# Patient Record
Sex: MALE | Race: BLACK OR AFRICAN AMERICAN | NOT HISPANIC OR LATINO | Employment: PART TIME | URBAN - METROPOLITAN AREA
[De-identification: names, ages, dates, MRNs, and addresses within clinical notes are randomized per-mention and may not be internally consistent; named-entity substitution may affect disease eponyms.]

---

## 2017-01-16 ENCOUNTER — ALLSCRIPTS OFFICE VISIT (OUTPATIENT)
Dept: OTHER | Facility: OTHER | Age: 22
End: 2017-01-16

## 2017-01-23 ENCOUNTER — HOSPITAL ENCOUNTER (OUTPATIENT)
Dept: INFUSION CENTER | Facility: HOSPITAL | Age: 22
Discharge: HOME | End: 2017-01-23
Payer: COMMERCIAL

## 2017-02-03 RX ORDER — ACETAMINOPHEN 325 MG/1
650 TABLET ORAL ONCE
Status: COMPLETED | OUTPATIENT
Start: 2017-02-06 | End: 2017-02-06

## 2017-02-06 ENCOUNTER — HOSPITAL ENCOUNTER (OUTPATIENT)
Dept: INFUSION CENTER | Facility: HOSPITAL | Age: 22
Discharge: HOME | End: 2017-02-06
Payer: COMMERCIAL

## 2017-02-06 VITALS
RESPIRATION RATE: 20 BRPM | BODY MASS INDEX: 25.72 KG/M2 | SYSTOLIC BLOOD PRESSURE: 129 MMHG | HEIGHT: 74 IN | TEMPERATURE: 98.2 F | HEART RATE: 65 BPM | WEIGHT: 200.4 LBS | DIASTOLIC BLOOD PRESSURE: 74 MMHG

## 2017-02-06 LAB
ALBUMIN SERPL BCP-MCNC: 4.2 G/DL (ref 3.5–5)
ALP SERPL-CCNC: 49 U/L (ref 46–116)
ALT SERPL W P-5'-P-CCNC: 20 U/L (ref 12–78)
ANION GAP SERPL CALCULATED.3IONS-SCNC: 7 MMOL/L (ref 4–13)
AST SERPL W P-5'-P-CCNC: 16 U/L (ref 5–45)
BASOPHILS # BLD AUTO: 0.07 THOUSANDS/ΜL (ref 0–0.1)
BASOPHILS NFR BLD AUTO: 2 % (ref 0–1)
BILIRUB SERPL-MCNC: 0.33 MG/DL (ref 0.2–1)
BUN SERPL-MCNC: 12 MG/DL (ref 5–25)
CALCIUM SERPL-MCNC: 8.8 MG/DL (ref 8.3–10.1)
CHLORIDE SERPL-SCNC: 107 MMOL/L (ref 100–108)
CO2 SERPL-SCNC: 28 MMOL/L (ref 21–32)
CREAT SERPL-MCNC: 1.1 MG/DL (ref 0.6–1.3)
EOSINOPHIL # BLD AUTO: 0.3 THOUSAND/ΜL (ref 0–0.61)
EOSINOPHIL NFR BLD AUTO: 6 % (ref 0–6)
ERYTHROCYTE [DISTWIDTH] IN BLOOD BY AUTOMATED COUNT: 12.6 % (ref 11.6–15.1)
GFR SERPL CREATININE-BSD FRML MDRD: >60 ML/MIN/1.73SQ M
GLUCOSE SERPL-MCNC: 78 MG/DL (ref 65–140)
HCT VFR BLD AUTO: 45 % (ref 36.5–49.3)
HGB BLD-MCNC: 15.8 G/DL (ref 12–17)
LYMPHOCYTES # BLD AUTO: 1.7 THOUSANDS/ΜL (ref 0.6–4.47)
LYMPHOCYTES NFR BLD AUTO: 36 % (ref 14–44)
MCH RBC QN AUTO: 30.9 PG (ref 26.8–34.3)
MCHC RBC AUTO-ENTMCNC: 35.1 G/DL (ref 31.4–37.4)
MCV RBC AUTO: 88 FL (ref 82–98)
MONOCYTES # BLD AUTO: 0.59 THOUSAND/ΜL (ref 0.17–1.22)
MONOCYTES NFR BLD AUTO: 13 % (ref 4–12)
NEUTROPHILS # BLD AUTO: 2.03 THOUSANDS/ΜL (ref 1.85–7.62)
NEUTS SEG NFR BLD AUTO: 43 % (ref 43–75)
NRBC BLD AUTO-RTO: 0 /100 WBCS
PLATELET # BLD AUTO: 229 THOUSANDS/UL (ref 149–390)
PMV BLD AUTO: 11.1 FL (ref 8.9–12.7)
POTASSIUM SERPL-SCNC: 3.7 MMOL/L (ref 3.5–5.3)
PROT SERPL-MCNC: 7.6 G/DL (ref 6.4–8.2)
RBC # BLD AUTO: 5.11 MILLION/UL (ref 3.88–5.62)
SODIUM SERPL-SCNC: 142 MMOL/L (ref 136–145)
WBC # BLD AUTO: 4.7 THOUSAND/UL (ref 4.31–10.16)

## 2017-02-06 PROCEDURE — 96415 CHEMO IV INFUSION ADDL HR: CPT

## 2017-02-06 PROCEDURE — 85025 COMPLETE CBC W/AUTO DIFF WBC: CPT | Performed by: INTERNAL MEDICINE

## 2017-02-06 PROCEDURE — 80053 COMPREHEN METABOLIC PANEL: CPT | Performed by: INTERNAL MEDICINE

## 2017-02-06 PROCEDURE — 96375 TX/PRO/DX INJ NEW DRUG ADDON: CPT

## 2017-02-06 PROCEDURE — 96413 CHEMO IV INFUSION 1 HR: CPT

## 2017-02-06 PROCEDURE — 96367 TX/PROPH/DG ADDL SEQ IV INF: CPT

## 2017-02-06 RX ADMIN — ACETAMINOPHEN 650 MG: 325 TABLET, FILM COATED ORAL at 11:21

## 2017-02-06 RX ADMIN — RITUXIMAB 791 MG: 10 INJECTION, SOLUTION INTRAVENOUS at 12:15

## 2017-02-06 RX ADMIN — HYDROCORTISONE SODIUM SUCCINATE 100 MG: 100 INJECTION, POWDER, FOR SOLUTION INTRAMUSCULAR; INTRAVENOUS at 11:21

## 2017-02-06 RX ADMIN — DIPHENHYDRAMINE HYDROCHLORIDE 25 MG: 50 INJECTION, SOLUTION INTRAMUSCULAR; INTRAVENOUS at 11:21

## 2017-02-06 NOTE — PLAN OF CARE
Problem: Potential for Falls  Goal: Patient will remain free of falls  INTERVENTIONS:  - Assess patient frequently for physical needs  - Identify cognitive and physical deficits and behaviors that affect risk of falls    - [place] fall precautions as indicated by assessment   - Educate patient/family on patient safety including physical limitations  - Instruct patient to call for assistance with activity based on assessment  - Modify environment to reduce risk of injury  - Consider OT/PT consult to assist with strengthening/mobility   Outcome: Progressing

## 2017-04-14 ENCOUNTER — TRANSCRIBE ORDERS (OUTPATIENT)
Dept: ADMINISTRATIVE | Facility: HOSPITAL | Age: 22
End: 2017-04-14

## 2017-04-19 ENCOUNTER — ALLSCRIPTS OFFICE VISIT (OUTPATIENT)
Dept: OTHER | Facility: OTHER | Age: 22
End: 2017-04-19

## 2017-04-19 ENCOUNTER — TRANSCRIBE ORDERS (OUTPATIENT)
Dept: ADMINISTRATIVE | Facility: HOSPITAL | Age: 22
End: 2017-04-19

## 2017-04-19 DIAGNOSIS — C81.00 NODULAR LYMPHOCYTE PREDOMINANT HODGKIN LYMPHOMA (HCC): ICD-10-CM

## 2017-04-19 DIAGNOSIS — C81.40: Primary | ICD-10-CM

## 2017-04-19 DIAGNOSIS — D69.3 IMMUNE THROMBOCYTOPENIC PURPURA (HCC): ICD-10-CM

## 2017-04-21 ENCOUNTER — GENERIC CONVERSION - ENCOUNTER (OUTPATIENT)
Dept: OTHER | Facility: OTHER | Age: 22
End: 2017-04-21

## 2017-04-26 ENCOUNTER — ALLSCRIPTS OFFICE VISIT (OUTPATIENT)
Dept: OTHER | Facility: OTHER | Age: 22
End: 2017-04-26

## 2017-05-02 ENCOUNTER — HOSPITAL ENCOUNTER (OUTPATIENT)
Dept: CT IMAGING | Facility: CLINIC | Age: 22
Discharge: HOME | End: 2017-05-02
Payer: COMMERCIAL

## 2017-05-02 DIAGNOSIS — C81.40: ICD-10-CM

## 2017-05-02 PROCEDURE — 70491 CT SOFT TISSUE NECK W/DYE: CPT

## 2017-05-02 PROCEDURE — 71260 CT THORAX DX C+: CPT

## 2017-05-02 PROCEDURE — 74177 CT ABD & PELVIS W/CONTRAST: CPT

## 2017-05-02 RX ADMIN — IOHEXOL 100 ML: 350 INJECTION, SOLUTION INTRAVENOUS at 10:22

## 2017-05-03 ENCOUNTER — ALLSCRIPTS OFFICE VISIT (OUTPATIENT)
Dept: OTHER | Facility: OTHER | Age: 22
End: 2017-05-03

## 2017-05-11 RX ORDER — ACETAMINOPHEN 325 MG/1
650 TABLET ORAL ONCE
Status: COMPLETED | OUTPATIENT
Start: 2017-05-12 | End: 2017-05-12

## 2017-05-12 ENCOUNTER — HOSPITAL ENCOUNTER (OUTPATIENT)
Dept: INFUSION CENTER | Facility: HOSPITAL | Age: 22
Discharge: HOME | End: 2017-05-12
Payer: COMMERCIAL

## 2017-05-12 VITALS
BODY MASS INDEX: 25.69 KG/M2 | HEIGHT: 74 IN | HEART RATE: 70 BPM | SYSTOLIC BLOOD PRESSURE: 139 MMHG | DIASTOLIC BLOOD PRESSURE: 75 MMHG | TEMPERATURE: 98.6 F | RESPIRATION RATE: 18 BRPM | WEIGHT: 200.18 LBS

## 2017-05-12 LAB
ALBUMIN SERPL BCP-MCNC: 3.9 G/DL (ref 3.5–5)
ALP SERPL-CCNC: 53 U/L (ref 46–116)
ALT SERPL W P-5'-P-CCNC: 20 U/L (ref 12–78)
ANION GAP SERPL CALCULATED.3IONS-SCNC: 7 MMOL/L (ref 4–13)
AST SERPL W P-5'-P-CCNC: 13 U/L (ref 5–45)
BASOPHILS # BLD AUTO: 0.06 THOUSANDS/ΜL (ref 0–0.1)
BASOPHILS NFR BLD AUTO: 1 % (ref 0–1)
BILIRUB SERPL-MCNC: 0.46 MG/DL (ref 0.2–1)
BUN SERPL-MCNC: 11 MG/DL (ref 5–25)
CALCIUM SERPL-MCNC: 9 MG/DL (ref 8.3–10.1)
CHLORIDE SERPL-SCNC: 106 MMOL/L (ref 100–108)
CO2 SERPL-SCNC: 28 MMOL/L (ref 21–32)
CREAT SERPL-MCNC: 1 MG/DL (ref 0.6–1.3)
EOSINOPHIL # BLD AUTO: 0.34 THOUSAND/ΜL (ref 0–0.61)
EOSINOPHIL NFR BLD AUTO: 5 % (ref 0–6)
ERYTHROCYTE [DISTWIDTH] IN BLOOD BY AUTOMATED COUNT: 13 % (ref 11.6–15.1)
GFR SERPL CREATININE-BSD FRML MDRD: >60 ML/MIN/1.73SQ M
GLUCOSE SERPL-MCNC: 83 MG/DL (ref 65–140)
HCT VFR BLD AUTO: 43.9 % (ref 36.5–49.3)
HGB BLD-MCNC: 15 G/DL (ref 12–17)
LYMPHOCYTES # BLD AUTO: 2.54 THOUSANDS/ΜL (ref 0.6–4.47)
LYMPHOCYTES NFR BLD AUTO: 40 % (ref 14–44)
MCH RBC QN AUTO: 30 PG (ref 26.8–34.3)
MCHC RBC AUTO-ENTMCNC: 34.2 G/DL (ref 31.4–37.4)
MCV RBC AUTO: 88 FL (ref 82–98)
MONOCYTES # BLD AUTO: 0.64 THOUSAND/ΜL (ref 0.17–1.22)
MONOCYTES NFR BLD AUTO: 10 % (ref 4–12)
NEUTROPHILS # BLD AUTO: 2.72 THOUSANDS/ΜL (ref 1.85–7.62)
NEUTS SEG NFR BLD AUTO: 44 % (ref 43–75)
NRBC BLD AUTO-RTO: 0 /100 WBCS
PLATELET # BLD AUTO: 259 THOUSANDS/UL (ref 149–390)
PMV BLD AUTO: 11 FL (ref 8.9–12.7)
POTASSIUM SERPL-SCNC: 3.7 MMOL/L (ref 3.5–5.3)
PROT SERPL-MCNC: 7.4 G/DL (ref 6.4–8.2)
RBC # BLD AUTO: 5 MILLION/UL (ref 3.88–5.62)
SODIUM SERPL-SCNC: 141 MMOL/L (ref 136–145)
WBC # BLD AUTO: 6.32 THOUSAND/UL (ref 4.31–10.16)

## 2017-05-12 PROCEDURE — 96367 TX/PROPH/DG ADDL SEQ IV INF: CPT

## 2017-05-12 PROCEDURE — 85025 COMPLETE CBC W/AUTO DIFF WBC: CPT | Performed by: INTERNAL MEDICINE

## 2017-05-12 PROCEDURE — 96375 TX/PRO/DX INJ NEW DRUG ADDON: CPT

## 2017-05-12 PROCEDURE — 96415 CHEMO IV INFUSION ADDL HR: CPT

## 2017-05-12 PROCEDURE — 80053 COMPREHEN METABOLIC PANEL: CPT | Performed by: INTERNAL MEDICINE

## 2017-05-12 PROCEDURE — 96413 CHEMO IV INFUSION 1 HR: CPT

## 2017-05-12 RX ADMIN — RITUXIMAB 791 MG: 10 INJECTION, SOLUTION INTRAVENOUS at 13:27

## 2017-05-12 RX ADMIN — ACETAMINOPHEN 650 MG: 325 TABLET, FILM COATED ORAL at 12:32

## 2017-05-12 RX ADMIN — DIPHENHYDRAMINE HYDROCHLORIDE 25 MG: 50 INJECTION, SOLUTION INTRAMUSCULAR; INTRAVENOUS at 12:32

## 2017-05-12 RX ADMIN — HYDROCORTISONE SODIUM SUCCINATE 100 MG: 100 INJECTION, POWDER, FOR SOLUTION INTRAMUSCULAR; INTRAVENOUS at 12:32

## 2017-05-12 NOTE — PLAN OF CARE
Problem: Potential for Falls  Goal: Patient will remain free of falls  INTERVENTIONS:  - Assess patient frequently for physical needs  - Identify cognitive and physical deficits and behaviors that affect risk of falls    - [place] fall precautions as indicated by assessment   - Educate patient/family on patient safety including physical limitations  - Instruct patient to call for assistance with activity based on assessment  - Modify environment to reduce risk of injury  - Consider OT/PT consult to assist with strengthening/mobility   Outcome: Progressing

## 2017-05-26 DIAGNOSIS — J18.1 LOBAR PNEUMONIA (HCC): ICD-10-CM

## 2017-08-25 RX ORDER — ACETAMINOPHEN 325 MG/1
650 TABLET ORAL ONCE
Status: DISCONTINUED | OUTPATIENT
Start: 2017-08-28 | End: 2017-08-28

## 2017-08-25 RX ORDER — SODIUM CHLORIDE 9 MG/ML
20 INJECTION, SOLUTION INTRAVENOUS CONTINUOUS
Status: DISCONTINUED | OUTPATIENT
Start: 2017-08-28 | End: 2017-08-28

## 2017-08-28 ENCOUNTER — HOSPITAL ENCOUNTER (OUTPATIENT)
Dept: INFUSION CENTER | Facility: CLINIC | Age: 22
Discharge: HOME | End: 2017-08-28
Payer: COMMERCIAL

## 2017-08-28 RX ORDER — SODIUM CHLORIDE 9 MG/ML
20 INJECTION, SOLUTION INTRAVENOUS CONTINUOUS
Status: DISCONTINUED | OUTPATIENT
Start: 2017-08-29 | End: 2017-09-01 | Stop reason: HOSPADM

## 2017-08-28 RX ORDER — ACETAMINOPHEN 325 MG/1
650 TABLET ORAL ONCE
Status: COMPLETED | OUTPATIENT
Start: 2017-08-29 | End: 2017-08-29

## 2017-08-29 ENCOUNTER — HOSPITAL ENCOUNTER (OUTPATIENT)
Dept: INFUSION CENTER | Facility: CLINIC | Age: 22
Discharge: HOME | End: 2017-08-29
Payer: COMMERCIAL

## 2017-08-29 VITALS
OXYGEN SATURATION: 98 % | WEIGHT: 204.5 LBS | BODY MASS INDEX: 27.1 KG/M2 | SYSTOLIC BLOOD PRESSURE: 122 MMHG | DIASTOLIC BLOOD PRESSURE: 68 MMHG | HEART RATE: 63 BPM | RESPIRATION RATE: 16 BRPM | HEIGHT: 73 IN | TEMPERATURE: 97.6 F

## 2017-08-29 PROCEDURE — 96413 CHEMO IV INFUSION 1 HR: CPT

## 2017-08-29 PROCEDURE — 96367 TX/PROPH/DG ADDL SEQ IV INF: CPT

## 2017-08-29 PROCEDURE — 96415 CHEMO IV INFUSION ADDL HR: CPT

## 2017-08-29 PROCEDURE — 96375 TX/PRO/DX INJ NEW DRUG ADDON: CPT

## 2017-08-29 RX ADMIN — HYDROCORTISONE SODIUM SUCCINATE 100 MG: 100 INJECTION, POWDER, FOR SOLUTION INTRAMUSCULAR; INTRAVENOUS at 10:17

## 2017-08-29 RX ADMIN — ACETAMINOPHEN 650 MG: 325 TABLET ORAL at 10:15

## 2017-08-29 RX ADMIN — SODIUM CHLORIDE 20 ML/HR: 0.9 INJECTION, SOLUTION INTRAVENOUS at 10:00

## 2017-08-29 RX ADMIN — DIPHENHYDRAMINE HYDROCHLORIDE 25 MG: 50 INJECTION, SOLUTION INTRAMUSCULAR; INTRAVENOUS at 10:20

## 2017-08-29 RX ADMIN — RITUXIMAB 799 MG: 10 INJECTION, SOLUTION INTRAVENOUS at 10:50

## 2017-08-29 NOTE — PROGRESS NOTES
Pt tolerated infusion without issue  PIV d/c'd dsd applied  Pt has f/u appt next infusion in 3 months  Pt declined AVS

## 2017-08-29 NOTE — PROGRESS NOTES
Pt to infusion center for rituxan for lymphocytic lymphoma  Pt offers no complaints at this time  PIV inserted without issue pt tolerated well  Labs reviewed wnl

## 2017-09-11 ENCOUNTER — GENERIC CONVERSION - ENCOUNTER (OUTPATIENT)
Dept: OTHER | Facility: OTHER | Age: 22
End: 2017-09-11

## 2017-09-11 DIAGNOSIS — C81.00 NODULAR LYMPHOCYTE PREDOMINANT HODGKIN LYMPHOMA (HCC): ICD-10-CM

## 2017-11-24 RX ORDER — SODIUM CHLORIDE 9 MG/ML
20 INJECTION, SOLUTION INTRAVENOUS CONTINUOUS
Status: DISCONTINUED | OUTPATIENT
Start: 2017-11-27 | End: 2017-11-30 | Stop reason: HOSPADM

## 2017-11-24 RX ORDER — ACETAMINOPHEN 325 MG/1
650 TABLET ORAL ONCE
Status: COMPLETED | OUTPATIENT
Start: 2017-11-27 | End: 2017-11-27

## 2017-11-27 ENCOUNTER — HOSPITAL ENCOUNTER (OUTPATIENT)
Dept: INFUSION CENTER | Facility: CLINIC | Age: 22
Discharge: HOME | End: 2017-11-27
Payer: COMMERCIAL

## 2017-11-27 VITALS
WEIGHT: 203.71 LBS | SYSTOLIC BLOOD PRESSURE: 146 MMHG | TEMPERATURE: 98.1 F | BODY MASS INDEX: 27 KG/M2 | HEIGHT: 73 IN | DIASTOLIC BLOOD PRESSURE: 72 MMHG | RESPIRATION RATE: 16 BRPM | HEART RATE: 68 BPM

## 2017-11-27 PROCEDURE — 96367 TX/PROPH/DG ADDL SEQ IV INF: CPT

## 2017-11-27 PROCEDURE — 96413 CHEMO IV INFUSION 1 HR: CPT

## 2017-11-27 PROCEDURE — 96375 TX/PRO/DX INJ NEW DRUG ADDON: CPT

## 2017-11-27 PROCEDURE — 96415 CHEMO IV INFUSION ADDL HR: CPT

## 2017-11-27 RX ADMIN — SODIUM CHLORIDE 20 ML/HR: 0.9 INJECTION, SOLUTION INTRAVENOUS at 10:30

## 2017-11-27 RX ADMIN — RITUXIMAB 799 MG: 10 INJECTION, SOLUTION INTRAVENOUS at 12:05

## 2017-11-27 RX ADMIN — ACETAMINOPHEN 650 MG: 325 TABLET ORAL at 10:29

## 2017-11-27 RX ADMIN — HYDROCORTISONE SODIUM SUCCINATE 100 MG: 100 INJECTION, POWDER, FOR SOLUTION INTRAMUSCULAR; INTRAVENOUS at 11:05

## 2017-11-27 RX ADMIN — DIPHENHYDRAMINE HYDROCHLORIDE 25 MG: 50 INJECTION, SOLUTION INTRAMUSCULAR; INTRAVENOUS at 11:22

## 2017-11-27 NOTE — PROGRESS NOTES
Spoke with Sukhwinder Wasserman for Dr Nery Hernández  Informed her pt ANC 0 8  Pt denies any sign of infection  Pt afebrile at this time   Per Dr Prudence Ford ok to proceed with patient treatment with ANC of 0 8

## 2017-11-27 NOTE — PLAN OF CARE
Problem: INFECTION - ADULT  Goal: Absence of fever/infection during neutropenic period  INTERVENTIONS:  - Monitor WBC  - Implement neutropenic guidelines   Outcome: Progressing      Problem: Knowledge Deficit  Goal: Patient/family/caregiver demonstrates understanding of disease process, treatment plan, medications, and discharge instructions  Complete learning assessment and assess knowledge base    Interventions:  - Provide teaching at level of understanding  - Provide teaching via preferred learning methods   Outcome: Progressing

## 2018-01-11 NOTE — MISCELLANEOUS
Message   Recorded as Task   Date: 10/13/2016 01:36 PM, Created By: Concetta Ray   Task Name: Call Back   Assigned To: Liz Donohue   Regarding Patient: Natalie Malcolm, Status: In Progress   Jhespinoza Ashraf - 13 Oct 2016 1:36 PM     TASK CREATED  Patient called just had a visit with Dr Mcdonnell Em was told he "does not have cancer" and wants to know can  cancel his treatment appointment  CELL PHONE [phone]   Liz Donohue - 13 Oct 2016 2:01 PM     TASK IN PROGRESS   Liz Donohue - 13 Oct 2016 2:25 PM     TASK EDITED  Per Dr Crow Yeager is for ITP, patient notified to complete the 4 doses of rituxan, patient and mother verbalize understanding        Active Problems    1  Acne (706 1) (L70 9)   2  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

## 2018-01-12 VITALS
RESPIRATION RATE: 16 BRPM | HEART RATE: 92 BPM | SYSTOLIC BLOOD PRESSURE: 120 MMHG | HEIGHT: 73 IN | WEIGHT: 202.13 LBS | TEMPERATURE: 99.4 F | DIASTOLIC BLOOD PRESSURE: 72 MMHG | BODY MASS INDEX: 26.79 KG/M2 | OXYGEN SATURATION: 99 %

## 2018-01-12 VITALS
RESPIRATION RATE: 16 BRPM | DIASTOLIC BLOOD PRESSURE: 70 MMHG | WEIGHT: 205.31 LBS | TEMPERATURE: 98.1 F | SYSTOLIC BLOOD PRESSURE: 122 MMHG | HEART RATE: 71 BPM | OXYGEN SATURATION: 98 % | HEIGHT: 73 IN | BODY MASS INDEX: 27.21 KG/M2

## 2018-01-12 NOTE — PROGRESS NOTES
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)    Plan  Idiopathic thrombocytopenic purpura    · (1) CBC/PLT/DIFF; Status:Active; Requested ROSEMARY:[id number];    Perform:[facility]; XCI:[id number]; Last Updated By:Fabiana Eason; 11/7/2016 12:13:21 PM;Ordered; For:Idiopathic thrombocytopenic purpura; Ordered By:Sena Daniels);   · (1) CBC/PLT/DIFF; Status:Active; Requested FAV:[id number];    Perform:[facility]; OXF:[id number]; Last Updated Austin Irizarry; 11/7/2016 12:05:55 PM;Ordered; For:Idiopathic thrombocytopenic purpura; Ordered By:Sena Daniels);   · (1) CBC/PLT/DIFF; Status:Complete; Requested for:Recurring Schedule: 11/7/2016;  1/7/2017 ;    Perform:[facility]; UAN:[id number]; Last Updated Austin Irizarry; 11/7/2016 12:13:21 PM;Ordered; For:Idiopathic thrombocytopenic purpura; Ordered By:Sena Daniels); Lymphocyte-predominant Hodgkin's disease    · (1) COMPREHENSIVE METABOLIC PANEL; Status:Active; Requested for:Dates  Schedule: 1/7/2017 ;    Perform:[facility]; ZAX:[id number]; Last Updated Austin Irizarry; 11/7/2016 12:06:58 PM;Ordered; For:Lymphocyte-predominant Hodgkin's disease; Ordered By:Sean Daniels);   · (1) LD (LDH); Status:Active; Requested for:Dates Schedule: 1/7/2017 ;    Perform:[facility]; SASKIA:[id number]; Last Updated Austin Irizarry; 11/7/2016 12:06:58 PM;Ordered; For:Lymphocyte-predominant Hodgkin's disease; Ordered By:Sena Daniels); · Follow-up visit in 2 months Evaluation and Treatment  Follow-up  Status: Complete   Done: [id number]   Ordered;  For: Lymphocyte-predominant Hodgkin's disease; Ordered By: Des Mccarty) Performed:  Due: [id number]; Last Updated By: Dennys Dyson; 11/7/2016 12:15:03 PM    Discussion/Summary  Discussion Summary:   Recurrence of immune thrombocytopenic purpura and concern for recurrence of lymphocyte predominant Hodgkin lymphoma involving both axillary area greater on the left than the right area, cervical area and oropharynx  The insurance did not approve PET scan  CT N/C/A 9/8/16 compared to 2/2/2012 scan: Left greater than right axillary lymphadenopathy with some lymph nodes appearing larger and others smaller on today's study consistent with mixed chemotherapeutic response  Prominent but not pathologically enlarged lymph nodes within the level 2 chip station as well as within [facility] ring  Although somewhat atypical for discontinuous involvement of Hodgkin lymphoma, consider characterization with physiologic imaging  No soft tissue mass within the neck No involvement in the mediastinal, retroperitoneal, mesenteric, pelvic lymph nodes, normal liver and spleen  He was evaluated for second opinion by Dr Jacinto Rahman 9/12/16  Due to the indolent nature of the recommendation was to 1: Rebiopsy: 9/26/16 Left axillary LN: Reactive follicular hyperplasia  There is no evidence of a B-cell or T-cell lymphoproliferative disorder on Flow Cytometry  The present specimen is compared to the patient's previous lymph node biopsy, [id number], and no evidence of recurrent nodular lymphocyte predominant Hodgkin lymphoma is noted  - No evidence of Hodgkin lymphoma  Options if LPHL recurrence with low volume disease as recommended by Dr Jacinto Rahman were Rituxan or involved site RT  #2  Rituximab 375mg/m2 weekly x4 9/29/16 - 10/20/16  Platelets were 51 5/04/48  250 10/5/16  He is asked to have repeat CBCD as it was 1 month since his last Rituxan dose  If platelet count still adequate, will observe and follow up in 2 months with repeat labs  As biopsy was negative for maligancy and platelet count normalized , will observe at this time  No additional Rituxan  Patient verbalized understanding        History of Present Illness  HPI: 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 no evidence of bone marrow involvement, no B  symptoms, he presented with immune thrombocytopenic purpura and anemia in February 2011, treated with rituximab weekly x4 doses along with prednisone with excellent response of his thrombocytopenia and anemia, later on he received rituximab 375 mg per meter square every 2 months he finished dose #12 in March 2014       Previous Therapy:   Rituximab weekly x4 doses along with prednisone with excellent response of his thrombocytopenia and anemia, later on he received rituximab 375 mg per meter square every 2 months he finished dose #12 in March 2014  #2 Rituximab 375mg/m2 weekly x4 9/29/16 - 10/20/16  Interval History: At his [facility] follow up 8/26/16,left posterior cervical lymphadenopathy, left axillary lymphadenopathy palpated  Thrombocytopenia with platelet count 51 1/27/68 [facility]  CT/PET scan ordered, denied by insurance  CT neck, chest and abdomen 9/8/16 at [facility]: Left greater than right axillary lymphadenopathy with some lymph nodes appearing larger and others smaller on today's study consistent with mixed chemotherapeutic response  Excisional biopsy of the left axillary lymph node performed which did not identify recurrence  Continues to feel well  No 'B' symptoms  Review of Systems  Complete-Male:   Constitutional: No fever or chills, feels well, no tiredness, no recent weight gain or weight loss  Eyes: No complaints of eye pain, no red eyes, no discharge from eyes, no itchy eyes  ENT: no complaints of earache, no hearing loss, no nosebleeds, no nasal discharge, no sore throat, no hoarseness  Cardiovascular: No complaints of slow heart rate, no fast heart rate, no chest pain, no palpitations, no leg claudication, no lower extremity  Respiratory: No complaints of shortness of breath, no wheezing, no cough, no SOB on exertion, no orthopnea or PND     Gastrointestinal: No complaints of abdominal pain, no constipation, no nausea or vomiting, no diarrhea or bloody stools  Genitourinary: No complaints of dysuria, no incontinence, no hesitancy, no nocturia, no genital lesion, no testicular pain  Musculoskeletal: No complaints of arthralgia, no myalgias, no joint swelling or stiffness, no limb pain or swelling  Integumentary: No complaints of skin rash or skin lesions, no itching, no skin wound, no dry skin  Neurological: No compliants of headache, no confusion, no convulsions, no numbness or tingling, no dizziness or fainting, no limb weakness, no difficulty walking  Psychiatric: Is not suicidal, no sleep disturbances, no anxiety or depression, no change in personality, no emotional problems  Endocrine: No complaints of proptosis, no hot flashes, no muscle weakness, no erectile dysfunction, no deepening of the voice, no feelings of weakness  Hematologic/Lymphatic: No complaints of swollen glands, no swollen glands in the neck, does not bleed easily, no easy bruising  Active Problems    1  Acne (706 1) (L70 9)   2  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)   3  Idiopathic thrombocytopenic purpura (287 31) (D69 3)   4  Lymphadenopathy (785 6) (R59 1)   5  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)   6  Need for hepatitis A vaccination (V05 3) (Z23)    Past Medical History    1  History of School physical exam (V70 5) (Z02 0)    Surgical History    1  History of Biopsy Lymph Node   2  History of [place] Lymph Node Biopsy  Surgical History Reviewed: The surgical history was reviewed and updated today  Family History  Mother    1  No pertinent family history  Maternal Grandmother    2  Family history of Brain Cancer (V16 8)  Paternal Grandfather    3  Family history of Acute Lymphoma  Family History Reviewed: The family history was reviewed and updated today         Social History    · Denied: History of Alcohol Use (History)   · Denied: History of Drug Use   · Never A Smoker  Social History Reviewed: The social history was reviewed and updated today  Current Meds  Medication List Reviewed: The medication list was reviewed and updated today  Allergies    1  Penicillins    Vitals  Vital Signs    Recorded: 79EUG2595 56:55OW   Systolic 131   Diastolic 70   Heart Rate 67   Respiration 16   Temperature 97 9 F   Pain Scale 0   O2 Saturation 98   Height 6 ft 1 in   Weight 194 lb    BMI Calculated 25 59   BSA Calculated 2 12     Physical Exam    Constitutional   General appearance: No acute distress, well appearing and well nourished  Eyes   Conjunctiva and lids: No swelling, erythema, or discharge  Pupils and irises: Equal, round and reactive to light  Ears, Nose, Mouth, and Throat   External inspection of ears and nose: Normal     Otoscopic examination: Tympanic membrance translucent with normal light reflex  Canals patent without erythema  Oropharynx: Normal with no erythema, edema, exudate or lesions  Pulmonary   Auscultation of lungs: Clear to auscultation, equal breath sounds bilaterally, no wheezes, no rales, no rhonci  Cardiovascular   Auscultation of heart: Normal rate and rhythm, normal S1 and S2, without murmurs  Examination of extremities for edema and/or varicosities: Normal     Abdomen   Abdomen: Non-tender, no masses  Liver and spleen: No hepatomegaly or splenomegaly  Lymphatic   Palpation of lymph nodes in neck: Abnormal   left posterior cervical node enlargement, but no right posterior cervical node enlargement, no right submandibular node enlargement, no left submandibular node enlargement, no right supraclavicular node enlargement and no left supraclavicular node enlargement  Musculoskeletal   Gait and station: Normal     Digits and nails: Normal without clubbing or cyanosis  Inspection/palpation of joints, bones, and muscles: Normal     Skin   Skin and subcutaneous tissue: Normal without rashes or lesions      Neurologic Cranial nerves: Cranial nerves 2-12 intact  Psychiatric   Orientation to person, place and time: Normal     Mood and affect: Normal     Additional Exam:  Left axillary lymphadenopathy measuring up to 2 cm           ECOG Zero       Signatures   Electronically signed by : Benjamin [facility]; Nov 7 2016 12:27PM EST                       (Author)

## 2018-01-13 NOTE — PROGRESS NOTES
Preliminary Nursing Report                Patient Information    Initial Encounter Entry Date:   2016 10:59 AM EST (Automated Transmission Automated Transmission)       Last Modified:   {CharlyH  ModifiedOn}              Legal Name: Carline Caslilas        Social Security Number:        YOB: 1995        Age (years): 21        Gender: M        Body Mass Index (BMI): 2 kg/m2        Height: 73 in  Weight: 12 lbs (5 kgs)           Address:   [address] [place] [address]              Phone: [phone]   (consent to leave messages)        Email:        Ethnicity: Decline to State        Scientology:        Marital Status:        Preferred Language: English        Race: Other Race                    Patient Insurance Information        Primary Insurance Information Carrier Name: {Primary  CarrierName}           Carrier Address:   {Primary  CarrierAddress}              Carrier Phone: {Primary  CarrierPhone}          Group Number: {Primary  GroupNumber}          Policy Number: {Primary  PolicyNumber}          Insured Name: {Primary  InsuredName}          Insured : {Primary  InsuredDOB}          Relationship to Insured: {Primary  RelationshiptoInsured}           Secondary Insurance Information Carrier Name: {Secondary  CarrierName}           Carrier Address:   {Secondary  CarrierAddress}              Carrier Phone: {Secondary  CarrierPhone}          Group Number: {Secondary  GroupNumber}          Policy Number: {Secondary  PolicyNumber}          Insured Name: {Secondary  InsuredName}          Insured : {Secondary  InsuredDOB}          Relationship to Insured: {Secondary  RelationshiptoInsured}                       Health Profile   Booking #:   Moira Rajan #: [id number]               DOS: 2016    Surgery : BIOPSY/REMOVAL,LYMPH NODE(S)        Add'l Procedures/Notes:     Surgery Risk: Intermediate          Precautions     BMI                   Allergies    Penicillins Medications    Vicodin 5-300 MG Oral Tablet               Conditions    Acne       Idiopathic thrombocytopenic purpura       Lymphadenopathy       Lymphocyte-predominant Hodgkin's disease       Need for hepatitis A vaccination       School physical exam               Family History    None             Surgical History    None             Social History    Denies Alcohol Use (History)       Denies Drug Use       Never A Smoker                               Patient Instructions       ? NPO Instructions   The day before surgery it is recommended to have a light dinner at your usual time and you are allowed a light snack early in the evening  Do not eat anything heavy or eat a big meal after 7pm  Do not eat or drink anything after midnight prior to your surgery  If you are supposed to take any of your medications, do so with a sip of water  Failure to follow these instructions can lead to an increased risk of lung complications and may result in a delay or cancellation of your procedure  If you have any questions, contact your institution for further instructions  No candy, no gum, no mints, no chewing tobacco   Triggered by: Medical Procedure Risk         ? Opioids (Pain Medication) 62  Please continue the following medications, if needed, up to and including the day of surgery (with a sip of water)  Triggered by: Vicodin 5-300 MG Oral Tablet               Testing Considerations       ? Chest X-ray (CXR) t  Consider a Chest X-Ray (CXR) if patient is having respiratory symptoms  Triggered by: Lymphocyte-predominant Hodgkin's disease         ? Coagulation Tests (PT/PTT/INR) t  Triggered by: Idiopathic thrombocytopenic purpura, Lymphocyte-predominant Hodgkin's disease         ? Complete Blood Count (CBC) t, client, client  If test was completed and normal within last six months, repeat test is not necessary  Triggered by:  Idiopathic thrombocytopenic purpura, Lymphocyte-predominant Hodgkin's disease, Age or Facility Rec ? Comprehensive Metabolic Panel (CMP) t  If test was completed and normal within last six months, repeat test is not necessary  Triggered by: Lymphocyte-predominant Hodgkin's disease         ? Type and Screen client  Type and Screen - Blood: If there is anticipated or possible large blood loss with this procedure, then a Type and Screen for Blood should be ordered  Triggered by: Age or Facility Rec               Consultations       ? Primary Care Physician Evaluation   Primary care physician may need to evaluate patient prior to surgery  This is likely NOT necessary if the listed conditions are chronic and stable  Triggered by: Idiopathic thrombocytopenic purpura, Lymphocyte-predominant Hodgkin's disease               Miscellaneous Questions         Question: Are you able to walk up a flight of stairs, walk up a hill or do heavy housework WITHOUT having chest pain or shortness of breath? Answer: YES                   Allergies/Conditions/Medications Not Found        The following were not recognized by our system when generating the recommendations  Please consider if this would impact any preoperative protocols  ? Denies Alcohol Use (History)       ? Denies Drug Use       ? Never A Smoker                  Appointment Information         Date:    09/26/2016        Location:    [place]        Address:           Directions:                      Footnotes revision 14      ?? Denotes a free-text entry  Legal Disclaimer: Any and all recommendations and services provided herein are designed to assist in the preoperative care of the patient  Nothing contained herein is designed to replace, eliminate or alleviate the responsibility of the attending physician to supervise and determine the patient?s preoperative care and course of treatment  Failure to provide complete, accurate information may negatively impact the system?s ability to recommend the proper preoperative protocol       THE ATTENDING PHYSICIAN IS RESPONSIBLE TO REVIEW THE SUGGESTED PREOPERATIVE PROTOCOLS/COURSE OF TREATMENT AND PRESCRIBE THE FINAL COURSE OF PREOPERATIVE TREATMENT IN CONSULTATION WITH THE PATIENT  THE ePREOP SYSTEM AND ITS MATERIALS ARE PROVIDED ? AS IS? WITHOUT WARRANTY OF ANY KIND, EXPRESS OR IMPLIED, INCLUDING, BUT NOT LIMITED TO, WARRANTIES OF PERFORMANCE OR MERCHANTABILITY OR FITNESS FOR A PARTICULAR PURPOSE  PATIENT AND PHYSICIANS HEREBY AGREE THAT THEIR USE OF THE MATERIALS AND RESOURCES ACT AS A CONSENT TO RELEASE AND WAIVE ePREOP FROM ANY AND ALL CLAIMS OF WARRANTY, TORT OR CONTRACT LAW OF ANY KIND  Electronically signed by:Fabian MENG    Sep 16 2016  5:18PM EST

## 2018-01-13 NOTE — MISCELLANEOUS
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

## 2018-01-14 VITALS
OXYGEN SATURATION: 98 % | TEMPERATURE: 96.6 F | WEIGHT: 198 LBS | RESPIRATION RATE: 16 BRPM | DIASTOLIC BLOOD PRESSURE: 78 MMHG | HEIGHT: 73 IN | HEART RATE: 63 BPM | SYSTOLIC BLOOD PRESSURE: 120 MMHG | BODY MASS INDEX: 26.24 KG/M2

## 2018-01-14 VITALS
TEMPERATURE: 98.5 F | SYSTOLIC BLOOD PRESSURE: 122 MMHG | DIASTOLIC BLOOD PRESSURE: 84 MMHG | BODY MASS INDEX: 26.26 KG/M2 | HEIGHT: 73 IN | WEIGHT: 198.13 LBS | OXYGEN SATURATION: 95 % | HEART RATE: 72 BPM

## 2018-01-22 VITALS
TEMPERATURE: 98 F | WEIGHT: 201 LBS | RESPIRATION RATE: 16 BRPM | HEIGHT: 73 IN | SYSTOLIC BLOOD PRESSURE: 120 MMHG | BODY MASS INDEX: 26.64 KG/M2 | HEART RATE: 60 BPM | DIASTOLIC BLOOD PRESSURE: 72 MMHG

## 2018-02-12 ENCOUNTER — OFFICE VISIT (OUTPATIENT)
Dept: HEMATOLOGY ONCOLOGY | Facility: CLINIC | Age: 23
End: 2018-02-12
Payer: COMMERCIAL

## 2018-02-12 ENCOUNTER — APPOINTMENT (OUTPATIENT)
Dept: LAB | Facility: CLINIC | Age: 23
End: 2018-02-12
Payer: COMMERCIAL

## 2018-02-12 ENCOUNTER — TRANSCRIBE ORDERS (OUTPATIENT)
Dept: LAB | Facility: CLINIC | Age: 23
End: 2018-02-12

## 2018-02-12 VITALS
RESPIRATION RATE: 16 BRPM | OXYGEN SATURATION: 98 % | WEIGHT: 207 LBS | BODY MASS INDEX: 28.04 KG/M2 | HEIGHT: 72 IN | DIASTOLIC BLOOD PRESSURE: 74 MMHG | SYSTOLIC BLOOD PRESSURE: 120 MMHG | HEART RATE: 62 BPM | TEMPERATURE: 98.2 F

## 2018-02-12 DIAGNOSIS — D69.3 CHRONIC ITP (IDIOPATHIC THROMBOCYTOPENIA) (HCC): ICD-10-CM

## 2018-02-12 DIAGNOSIS — C81.00: ICD-10-CM

## 2018-02-12 DIAGNOSIS — C81.00: Primary | ICD-10-CM

## 2018-02-12 LAB
ALBUMIN SERPL BCP-MCNC: 3.9 G/DL (ref 3.5–5)
ALP SERPL-CCNC: 54 U/L (ref 46–116)
ALT SERPL W P-5'-P-CCNC: 20 U/L (ref 12–78)
ANION GAP SERPL CALCULATED.3IONS-SCNC: 8 MMOL/L (ref 4–13)
AST SERPL W P-5'-P-CCNC: 16 U/L (ref 5–45)
BASOPHILS # BLD AUTO: 0.03 THOUSANDS/ΜL (ref 0–0.1)
BASOPHILS NFR BLD AUTO: 0 % (ref 0–1)
BILIRUB SERPL-MCNC: 0.3 MG/DL (ref 0.2–1)
BUN SERPL-MCNC: 19 MG/DL (ref 5–25)
CALCIUM SERPL-MCNC: 8.7 MG/DL (ref 8.3–10.1)
CHLORIDE SERPL-SCNC: 105 MMOL/L (ref 100–108)
CO2 SERPL-SCNC: 28 MMOL/L (ref 21–32)
CREAT SERPL-MCNC: 1.11 MG/DL (ref 0.6–1.3)
EOSINOPHIL # BLD AUTO: 0.38 THOUSAND/ΜL (ref 0–0.61)
EOSINOPHIL NFR BLD AUTO: 5 % (ref 0–6)
ERYTHROCYTE [DISTWIDTH] IN BLOOD BY AUTOMATED COUNT: 12.7 % (ref 11.6–15.1)
GFR SERPL CREATININE-BSD FRML MDRD: 107 ML/MIN/1.73SQ M
GLUCOSE SERPL-MCNC: 89 MG/DL (ref 65–140)
HCT VFR BLD AUTO: 42.6 % (ref 36.5–49.3)
HGB BLD-MCNC: 14.5 G/DL (ref 12–17)
LDH SERPL-CCNC: 184 U/L (ref 81–234)
LYMPHOCYTES # BLD AUTO: 2.19 THOUSANDS/ΜL (ref 0.6–4.47)
LYMPHOCYTES NFR BLD AUTO: 31 % (ref 14–44)
MCH RBC QN AUTO: 29.7 PG (ref 26.8–34.3)
MCHC RBC AUTO-ENTMCNC: 34 G/DL (ref 31.4–37.4)
MCV RBC AUTO: 87 FL (ref 82–98)
MONOCYTES # BLD AUTO: 0.71 THOUSAND/ΜL (ref 0.17–1.22)
MONOCYTES NFR BLD AUTO: 10 % (ref 4–12)
NEUTROPHILS # BLD AUTO: 3.87 THOUSANDS/ΜL (ref 1.85–7.62)
NEUTS SEG NFR BLD AUTO: 54 % (ref 43–75)
PLATELET # BLD AUTO: 230 THOUSANDS/UL (ref 149–390)
PMV BLD AUTO: 10.9 FL (ref 8.9–12.7)
POTASSIUM SERPL-SCNC: 3.8 MMOL/L (ref 3.5–5.3)
PROT SERPL-MCNC: 7.4 G/DL (ref 6.4–8.2)
RBC # BLD AUTO: 4.89 MILLION/UL (ref 3.88–5.62)
SODIUM SERPL-SCNC: 141 MMOL/L (ref 136–145)
WBC # BLD AUTO: 7.18 THOUSAND/UL (ref 4.31–10.16)

## 2018-02-12 PROCEDURE — 85025 COMPLETE CBC W/AUTO DIFF WBC: CPT

## 2018-02-12 PROCEDURE — 83615 LACTATE (LD) (LDH) ENZYME: CPT

## 2018-02-12 PROCEDURE — 80053 COMPREHEN METABOLIC PANEL: CPT

## 2018-02-12 PROCEDURE — 36415 COLL VENOUS BLD VENIPUNCTURE: CPT

## 2018-02-12 PROCEDURE — 99214 OFFICE O/P EST MOD 30 MIN: CPT | Performed by: INTERNAL MEDICINE

## 2018-02-12 NOTE — LETTER
February 12, 2018     Kandis Fairchild MD  [address]  [address] [employer]  [address] [place] [id number]    Patient: Seun Botello  YOB: 1995   Date of Visit: 2/12/2018       Dear Dr Collette Shutters: Thank you for referring Zach Colon to me for evaluation  Below are my notes for this consultation  If you have questions, please do not hesitate to call me  I look forward to following your patient along with you  Sincerely,        Arcadio Oates MD        CC: No Recipients  Arcadio Oates MD  2/12/2018 12:59 PM  Sign at close encounter  Hematology Outpatient Follow - Up Note  Seun Botello  23 y o  male MRN: @ Encounter: [id number]        Date:  2/12/2018        Assessment/ Plan:    1  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 no bone marrow involvement no constitutional symptoms when he presented with thrombocytopenia as well as lymphadenopathy, he was treated with rituximab with good response in February 2011, and he received maintenance rituximab every 2 month finished 12 doses in March 2014   In September 2016 he had thrombocytopenia, axillary lymphadenopathy, biopsy did not confirm lymphocyte-predominant Hodgkin lymphoma however 2nd opinion at [facility] agreed on restarting rituximab  Proceed with dose 5 /out of 8 of rituximab in February 2018  2   Follow-up in 3 months with CBC, CMP, LDH          HPI:  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, no evidence of bone marrow involvement no constitutional symptoms when he presented with immune thrombocytopenic purpura and anemia in February 2011, status post excisional axillary lymph node biopsy, was seen on a 2nd opinion at [facility] and the recommendation to continue treatment with rituximab  He was treated with rituximab weekly x4 doses along with prednisone with excellent response of thrombocytopenia and anemia and later on he received rituximab 375 milligram/meter squared every 2 months finished dose 12  In March 2014  Follow-up in September 2016 showed left posterior cervical lymphadenopathy, left axillary lymphadenopathy and thrombocytopenia with platelet count 20937, PET scan denied by the insurance, CT scan showed left greater than the right axillary lymphadenopathy, excisional biopsy of the left axillary lymph node did not identify recurrence however a 2nd opinion at [facility] again recommended treatment with rituximab    Interval History:   No changes from the past 3 months      Previous Treatment:   As mentioned above  Current treatment rituximab 375 milligram/meter squared every three-month dose 5  Out of 8 in February 2018       Test Results:    Imaging: No results found  Labs:   Lab Results   Component Value Date    WBC 7 18 02/12/2018    HGB 14 5 02/12/2018    HCT 42 6 02/12/2018    MCV 87 02/12/2018     02/12/2018     Lab Results   Component Value Date     02/12/2018    K 3 8 02/12/2018     02/12/2018    CO2 28 02/12/2018    ANIONGAP 8 02/12/2018    BUN 19 02/12/2018    CREATININE 1 11 02/12/2018    GLUCOSE 89 02/12/2018    CALCIUM 8 7 02/12/2018    AST 16 02/12/2018    ALT 20 02/12/2018    ALKPHOS 54 02/12/2018    PROT 7 4 02/12/2018    BILITOT 0 30 02/12/2018    EGFR 107 02/12/2018       No results found for: IRON, TIBC, FERRITIN    No results found for: [id number]      ROS:   Review of Systems   All other systems reviewed and are negative  Current Medications: Reviewed  Allergies: Reviewed  PMH/FH/SH:  Reviewed      Physical Exam:    Body surface area is 2 16 meters squared      Wt Readings from Last 3 Encounters:   02/12/18 93 9 kg (207 lb)   11/27/17 92 4 kg (203 lb 11 3 oz)   09/11/17 91 2 kg (201 lb)        Temp Readings from Last 3 Encounters:   02/12/18 98 2 °F (36 8 °C)   11/27/17 98 1 °F (36 7 °C) (Oral)   09/11/17 98 °F (36 7 °C)        BP Readings from Last 3 Encounters:   02/12/18 120/74   11/27/17 146/72   09/11/17 120/72         Pulse Readings from Last 3 Encounters:   02/12/18 62   11/27/17 68   09/11/17 60        Physical Exam   Constitutional: He is oriented to person, place, and time  He appears well-developed  HENT:   Head: Normocephalic  Eyes: Conjunctivae are normal  Pupils are equal, round, and reactive to light  Neck: Normal range of motion  Neck supple  No JVD present  No thyromegaly present  Cardiovascular: Normal rate, regular rhythm, normal heart sounds and intact distal pulses  Pulmonary/Chest: Effort normal and breath sounds normal    Abdominal: Soft  Bowel sounds are normal  He exhibits no distension  There is no tenderness  There is no rebound  Musculoskeletal: Normal range of motion  Neurological: He is alert and oriented to person, place, and time  No cranial nerve deficit  Skin: Skin is warm and dry  No rash noted  No erythema  Goals and Barriers:  Current Goal: Minimize effects of disease  Barriers: None  Patient's Capacity to Self Care:  Patient is able to self care      Code Status: @[facility]@

## 2018-02-12 NOTE — PROGRESS NOTES
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 no bone marrow involvement no constitutional symptoms when he presented with thrombocytopenia as well as lymphadenopathy, he was treated with rituximab with good response in February 2011, and he received maintenance rituximab every 2 month finished 12 doses in March 2014   In September 2016 he had thrombocytopenia, axillary lymphadenopathy, biopsy did not confirm lymphocyte-predominant Hodgkin lymphoma however 2nd opinion at [facility] agreed on restarting rituximab  Proceed with dose 5 /out of 8 of rituximab in February 2018  2  Follow-up in 3 months with CBC, CMP, LDH          HPI:  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, no evidence of bone marrow involvement no constitutional symptoms when he presented with immune thrombocytopenic purpura and anemia in February 2011, status post excisional axillary lymph node biopsy, was seen on a 2nd opinion at [facility] and the recommendation to continue treatment with rituximab  He was treated with rituximab weekly x4 doses along with prednisone with excellent response of thrombocytopenia and anemia and later on he received rituximab 375 milligram/meter squared every 2 months finished dose 12   In March 2014  Follow-up in September 2016 showed left posterior cervical lymphadenopathy, left axillary lymphadenopathy and thrombocytopenia with platelet count 42215, PET scan denied by the insurance, CT scan showed left greater than the right axillary lymphadenopathy, excisional biopsy of the left axillary lymph node did not identify recurrence however a 2nd opinion at [facility] again recommended treatment with rituximab    Interval History: No changes from the past 3 months      Previous Treatment:   As mentioned above  Current treatment rituximab 375 milligram/meter squared every three-month dose 5  Out of 8 in February 2018       Test Results:    Imaging: No results found  Labs:   Lab Results   Component Value Date    WBC 7 18 02/12/2018    HGB 14 5 02/12/2018    HCT 42 6 02/12/2018    MCV 87 02/12/2018     02/12/2018     Lab Results   Component Value Date     02/12/2018    K 3 8 02/12/2018     02/12/2018    CO2 28 02/12/2018    ANIONGAP 8 02/12/2018    BUN 19 02/12/2018    CREATININE 1 11 02/12/2018    GLUCOSE 89 02/12/2018    CALCIUM 8 7 02/12/2018    AST 16 02/12/2018    ALT 20 02/12/2018    ALKPHOS 54 02/12/2018    PROT 7 4 02/12/2018    BILITOT 0 30 02/12/2018    EGFR 107 02/12/2018       No results found for: IRON, TIBC, FERRITIN    No results found for: [id number]      ROS:   Review of Systems   All other systems reviewed and are negative  Current Medications: Reviewed  Allergies: Reviewed  PMH/FH/SH:  Reviewed      Physical Exam:    Body surface area is 2 16 meters squared  Wt Readings from Last 3 Encounters:   02/12/18 93 9 kg (207 lb)   11/27/17 92 4 kg (203 lb 11 3 oz)   09/11/17 91 2 kg (201 lb)        Temp Readings from Last 3 Encounters:   02/12/18 98 2 °F (36 8 °C)   11/27/17 98 1 °F (36 7 °C) (Oral)   09/11/17 98 °F (36 7 °C)        BP Readings from Last 3 Encounters:   02/12/18 120/74   11/27/17 146/72   09/11/17 120/72         Pulse Readings from Last 3 Encounters:   02/12/18 62   11/27/17 68   09/11/17 60        Physical Exam   Constitutional: He is oriented to person, place, and time  He appears well-developed  HENT:   Head: Normocephalic  Eyes: Conjunctivae are normal  Pupils are equal, round, and reactive to light  Neck: Normal range of motion  Neck supple  No JVD present  No thyromegaly present  Cardiovascular: Normal rate, regular rhythm, normal heart sounds and intact distal pulses  Pulmonary/Chest: Effort normal and breath sounds normal    Abdominal: Soft  Bowel sounds are normal  He exhibits no distension  There is no tenderness  There is no rebound  Musculoskeletal: Normal range of motion  Neurological: He is alert and oriented to person, place, and time  No cranial nerve deficit  Skin: Skin is warm and dry  No rash noted  No erythema  Goals and Barriers:  Current Goal: Minimize effects of disease  Barriers: None  Patient's Capacity to Self Care:  Patient is able to self care      Code Status: @[facility]@

## 2018-02-26 RX ORDER — ACETAMINOPHEN 325 MG/1
650 TABLET ORAL ONCE
Status: COMPLETED | OUTPATIENT
Start: 2018-02-27 | End: 2018-02-27

## 2018-02-26 RX ORDER — SODIUM CHLORIDE 9 MG/ML
20 INJECTION, SOLUTION INTRAVENOUS CONTINUOUS
Status: DISCONTINUED | OUTPATIENT
Start: 2018-02-27 | End: 2018-03-01

## 2018-02-27 ENCOUNTER — HOSPITAL ENCOUNTER (OUTPATIENT)
Dept: INFUSION CENTER | Facility: CLINIC | Age: 23
Discharge: HOME | End: 2018-02-27
Payer: COMMERCIAL

## 2018-02-27 VITALS
RESPIRATION RATE: 18 BRPM | HEART RATE: 64 BPM | DIASTOLIC BLOOD PRESSURE: 76 MMHG | HEIGHT: 74 IN | BODY MASS INDEX: 26.65 KG/M2 | WEIGHT: 207.67 LBS | TEMPERATURE: 98.2 F | SYSTOLIC BLOOD PRESSURE: 133 MMHG

## 2018-02-27 PROCEDURE — 96413 CHEMO IV INFUSION 1 HR: CPT

## 2018-02-27 PROCEDURE — 96367 TX/PROPH/DG ADDL SEQ IV INF: CPT

## 2018-02-27 PROCEDURE — 96415 CHEMO IV INFUSION ADDL HR: CPT

## 2018-02-27 PROCEDURE — 96375 TX/PRO/DX INJ NEW DRUG ADDON: CPT

## 2018-02-27 RX ADMIN — DIPHENHYDRAMINE HYDROCHLORIDE 25 MG: 50 INJECTION, SOLUTION INTRAMUSCULAR; INTRAVENOUS at 10:10

## 2018-02-27 RX ADMIN — RITUXIMAB 799 MG: 10 INJECTION, SOLUTION INTRAVENOUS at 10:43

## 2018-02-27 RX ADMIN — ACETAMINOPHEN 650 MG: 325 TABLET ORAL at 10:08

## 2018-02-27 RX ADMIN — SODIUM CHLORIDE 20 ML/HR: 0.9 INJECTION, SOLUTION INTRAVENOUS at 10:09

## 2018-02-27 RX ADMIN — HYDROCORTISONE SODIUM SUCCINATE 100 MG: 100 INJECTION, POWDER, FOR SOLUTION INTRAMUSCULAR; INTRAVENOUS at 10:09

## 2018-02-27 NOTE — PLAN OF CARE
Problem: Potential for Falls  Goal: Patient will remain free of falls  INTERVENTIONS:  - Assess patient frequently for physical needs  -  Identify cognitive and physical deficits and behaviors that affect risk of falls    -  [facility] fall precautions as indicated by assessment   - Educate patient/family on patient safety including physical limitations  - Instruct patient to call for assistance with activity based on assessment  - Modify environment to reduce risk of injury  - Consider OT/PT consult to assist with strengthening/mobility   Outcome: Progressing

## 2018-03-01 ENCOUNTER — OFFICE VISIT (OUTPATIENT)
Dept: INTERNAL MEDICINE CLINIC | Facility: CLINIC | Age: 23
End: 2018-03-01
Payer: COMMERCIAL

## 2018-03-01 VITALS
DIASTOLIC BLOOD PRESSURE: 76 MMHG | HEART RATE: 76 BPM | OXYGEN SATURATION: 98 % | BODY MASS INDEX: 26.69 KG/M2 | SYSTOLIC BLOOD PRESSURE: 128 MMHG | HEIGHT: 74 IN | WEIGHT: 208 LBS | RESPIRATION RATE: 16 BRPM

## 2018-03-01 DIAGNOSIS — R05.9 COUGH: Primary | ICD-10-CM

## 2018-03-01 DIAGNOSIS — J20.9 ACUTE BRONCHITIS WITH BRONCHOSPASM: ICD-10-CM

## 2018-03-01 PROCEDURE — 99213 OFFICE O/P EST LOW 20 MIN: CPT | Performed by: PHYSICIAN ASSISTANT

## 2018-03-01 RX ORDER — AZITHROMYCIN 250 MG/1
TABLET, FILM COATED ORAL
Qty: 6 TABLET | Refills: 0 | Status: SHIPPED | OUTPATIENT
Start: 2018-03-01 | End: 2018-03-06

## 2018-03-01 NOTE — PATIENT INSTRUCTIONS
Rest, increase fluids, Tylenol for fever or aches as per package instructions  Start in finish the antibiotic  Can use Benadryl 25 mg at night before sleep  This will help the postnasal drip  Can start Allegra 180 mg OTC 1 daily  Can also add either Flonase,  Rhinocort, or Nasacort nasal spray 1 spray each nostril twice a day as demonstrated

## 2018-03-01 NOTE — PROGRESS NOTES
Assessment/Plan:     Patient Instructions   Rest, increase fluids, Tylenol for fever or aches as per package instructions  Start in finish the antibiotic  Can use Benadryl 25 mg at night before sleep  This will help the postnasal drip  Can start Allegra 180 mg OTC 1 daily  Can also add either Flonase,  Rhinocort, or Nasacort nasal spray 1 spray each nostril twice a day as demonstrated  Followup scheduled per orders  Diagnoses and all orders for this visit:    Cough  -     azithromycin (ZITHROMAX) 250 mg tablet; Take 2 tablets today then 1 tablet daily x 4 days    Acute bronchitis with bronchospasm  -     azithromycin (ZITHROMAX) 250 mg tablet; Take 2 tablets today then 1 tablet daily x 4 days          Subjective:      Patient ID: Corona Chow  is a 23 y o  male  Acute visit    Everyone in patient's household has been ill  Both patient's mother and father have been seen for acute illnesses and were treated with antibiotics and have improved  Patient is started with very similar symptoms of head congestion, sneezing, itchy eyes, postnasal drip with a cough primarily at night when supine  He is denying allergy symptoms in the past   He has noted blowing his nose that the mucus had been blood tinged at times  No earaches, no sore throat  Denies fever  Of note also patient is immunocompromised due to treatment for Hodgkin's disease  ALLERGIES:  Allergies   Allergen Reactions    Penicillins      As an infant - "lungs collapsed" per mom       CURRENT MEDICATIONS:    Current Outpatient Prescriptions:     azithromycin (ZITHROMAX) 250 mg tablet, Take 2 tablets today then 1 tablet daily x 4 days, Disp: 6 tablet, Rfl: 0  No current facility-administered medications for this visit       ACTIVE PROBLEM LIST:  Patient Active Problem List   Diagnosis    ITP (idiopathic thrombocytopenic purpura)    Stage IIA lymphocyte-predominant Hodgkin's disease (HCC)    Cough    Acute bronchitis with bronchospasm       PAST MEDICAL HISTORY:  Past Medical History:   Diagnosis Date    Cancer ([address] )     hodgkin's lymphoma    ITP (idiopathic thrombocytopenic purpura)        PAST SURGICAL HISTORY:  Past Surgical History:   Procedure Laterality Date    HERNIA REPAIR      umbilical - at 7 months of age   Meryl Almeida AK BX/REMV,LYMPH NODE,DEEP AXILL Left 9/26/2016    Procedure: AXILLARY LYMPH NODE BIOPSY;  Surgeon: Saqib Atkins MD;  [facility]: [facility];  Service: Surgical Oncology    SENTINEL LYMPH NODE BIOPSY Right        FAMILY HISTORY:  No family history on file  SOCIAL HISTORY:  Social History     Social History    Marital status: Single     Spouse name: N/A    Number of children: N/A    Years of education: N/A     Occupational History    Not on file  Social History Main Topics    Smoking status: Never Smoker    Smokeless tobacco: Never Used    Alcohol use No    Drug use: No    Sexual activity: Not on file     Other Topics Concern    Not on file     Social History Narrative    No narrative on file       Review of Systems   Constitutional: Negative for activity change, chills, fatigue and fever  HENT: Positive for congestion, postnasal drip, rhinorrhea and sneezing  Eyes: Negative for discharge  Respiratory: Positive for cough  Negative for chest tightness and shortness of breath  Cardiovascular: Negative for chest pain, palpitations and leg swelling  Gastrointestinal: Negative for abdominal pain  Genitourinary: Negative for difficulty urinating  Musculoskeletal: Negative for arthralgias and myalgias  Skin: Negative for rash  Allergic/Immunologic: Positive for immunocompromised state  Neurological: Negative for dizziness, syncope, weakness, light-headedness and headaches  Hematological: Negative for adenopathy  Does not bruise/bleed easily  Psychiatric/Behavioral: Negative for dysphoric mood  The patient is not nervous/anxious            Objective:  Vitals:    03/01/18 1256   BP: 128/76   Pulse: 76   Resp: 16   SpO2: 98%   Weight: 94 3 kg (208 lb)   Height: 6' 2" (1 88 m)        Physical Exam   Constitutional: He is oriented to person, place, and time  He appears well-developed and well-nourished  No distress  HENT:   Patient does have injected in granular conjunctiva  Nasal mucosa is pale, enlarged right inferior nasal turbinates, increased clear nasal mucoid drainage  Increased clear postnasal drainage in the posterior pharynx  Granular posterior pharynx   Neck: Neck supple  No JVD present  No thyromegaly present  Cardiovascular: Normal rate, regular rhythm and normal heart sounds  Pulmonary/Chest: Effort normal and breath sounds normal  No respiratory distress  He has no wheezes  Musculoskeletal: He exhibits no edema  Lymphadenopathy:     He has no cervical adenopathy  Neurological: He is alert and oriented to person, place, and time  Skin: Skin is warm and dry  No rash noted  Psychiatric: He has a normal mood and affect  Nursing note and vitals reviewed          RESULTS:    Recent Results (from the past 1008 hour(s))   CBC and differential    Collection Time: 02/12/18 10:56 AM   Result Value Ref Range    WBC 7 18 4 31 - 10 16 Thousand/uL    RBC 4 89 3 88 - 5 62 Million/uL    Hemoglobin 14 5 12 0 - 17 0 g/dL    Hematocrit 42 6 36 5 - 49 3 %    MCV 87 82 - 98 fL    MCH 29 7 26 8 - 34 3 pg    MCHC 34 0 31 4 - 37 4 g/dL    RDW 12 7 11 6 - 15 1 %    MPV 10 9 8 9 - 12 7 fL    Platelets 846 523 - 398 Thousands/uL    Neutrophils Relative 54 43 - 75 %    Lymphocytes Relative 31 14 - 44 %    Monocytes Relative 10 4 - 12 %    Eosinophils Relative 5 0 - 6 %    Basophils Relative 0 0 - 1 %    Neutrophils Absolute 3 87 1 85 - 7 62 Thousands/µL    Lymphocytes Absolute 2 19 0 60 - 4 47 Thousands/µL    Monocytes Absolute 0 71 0 17 - 1 22 Thousand/µL    Eosinophils Absolute 0 38 0 00 - 0 61 Thousand/µL    Basophils Absolute 0 03 0 00 - 0 10 Thousands/µL   Comprehensive metabolic panel Collection Time: 02/12/18 10:56 AM   Result Value Ref Range    Sodium 141 136 - 145 mmol/L    Potassium 3 8 3 5 - 5 3 mmol/L    Chloride 105 100 - 108 mmol/L    CO2 28 21 - 32 mmol/L    Anion Gap 8 4 - 13 mmol/L    BUN 19 5 - 25 mg/dL    Creatinine 1 11 0 60 - 1 30 mg/dL    Glucose 89 65 - 140 mg/dL    Calcium 8 7 8 3 - 10 1 mg/dL    AST 16 5 - 45 U/L    ALT 20 12 - 78 U/L    Alkaline Phosphatase 54 46 - 116 U/L    Total Protein 7 4 6 4 - 8 2 g/dL    Albumin 3 9 3 5 - 5 0 g/dL    Total Bilirubin 0 30 0 20 - 1 00 mg/dL    eGFR 107 ml/min/1 73sq m   LD,Blood    Collection Time: 02/12/18 10:56 AM   Result Value Ref Range     81 - 234 U/L

## 2018-05-01 ENCOUNTER — OFFICE VISIT (OUTPATIENT)
Dept: INTERNAL MEDICINE CLINIC | Facility: CLINIC | Age: 23
End: 2018-05-01
Payer: COMMERCIAL

## 2018-05-01 VITALS
SYSTOLIC BLOOD PRESSURE: 140 MMHG | WEIGHT: 204.2 LBS | RESPIRATION RATE: 16 BRPM | HEIGHT: 74 IN | OXYGEN SATURATION: 98 % | BODY MASS INDEX: 26.21 KG/M2 | HEART RATE: 74 BPM | DIASTOLIC BLOOD PRESSURE: 74 MMHG

## 2018-05-01 DIAGNOSIS — J30.9 ALLERGIC RHINITIS, UNSPECIFIED SEASONALITY, UNSPECIFIED TRIGGER: ICD-10-CM

## 2018-05-01 DIAGNOSIS — J01.40 ACUTE NON-RECURRENT PANSINUSITIS: ICD-10-CM

## 2018-05-01 DIAGNOSIS — J20.9 ACUTE BRONCHITIS WITH BRONCHOSPASM: Primary | ICD-10-CM

## 2018-05-01 PROCEDURE — 99213 OFFICE O/P EST LOW 20 MIN: CPT | Performed by: PHYSICIAN ASSISTANT

## 2018-05-01 RX ORDER — AZITHROMYCIN 250 MG/1
TABLET, FILM COATED ORAL
Qty: 6 TABLET | Refills: 0 | Status: SHIPPED | OUTPATIENT
Start: 2018-05-01 | End: 2018-05-05

## 2018-05-01 NOTE — PATIENT INSTRUCTIONS
Patient will continue taking Claritin 10 mg 1 daily  He will also continue his Nasacort nasal spray  Will provide prescription for antibiotic only to start if Claritin and nasal spray are not causing improvement  Take a probiotic daily if you start the antibiotic  Also patient to use his inhaler at bedtime

## 2018-05-01 NOTE — PROGRESS NOTES
Assessment/Plan:     Patient Instructions   Patient will continue taking Claritin 10 mg 1 daily  He will also continue his Nasacort nasal spray  Will provide prescription for antibiotic only to start if Claritin and nasal spray are not causing improvement  Take a probiotic daily if you start the antibiotic  Also patient to use his inhaler at bedtime  Followup scheduled per orders  Diagnoses and all orders for this visit:    Acute bronchitis with bronchospasm  -     azithromycin (ZITHROMAX) 250 mg tablet; Take 2 tablets today then 1 tablet daily x 4 days    Acute non-recurrent pansinusitis  -     azithromycin (ZITHROMAX) 250 mg tablet; Take 2 tablets today then 1 tablet daily x 4 days    Allergic rhinitis, unspecified seasonality, unspecified trigger          Subjective:      Patient ID: Alice Can  is a 20 y o  male  Acute visit    Patient states for the past 5 days he has noted especially at night increased congestion, some postnasal drip, wheezing and shortness of breath  He also notices a cough  He has not had any fever, no complaint of sweats or chills  Denies sneezing, itchy watery eyes, or persistent runny nose  He denies history of having previous allergies  He did remark that last week he was out visiting a friend in [place] and while sleeping in a hotel he awoke with what he is describing as a [address] attack          ALLERGIES:  Allergies   Allergen Reactions    Penicillins      As an infant - "lungs collapsed" per mom       CURRENT MEDICATIONS:    Current Outpatient Prescriptions:     azithromycin (ZITHROMAX) 250 mg tablet, Take 2 tablets today then 1 tablet daily x 4 days, Disp: 6 tablet, Rfl: 0    ACTIVE PROBLEM LIST:  Patient Active Problem List   Diagnosis    ITP (idiopathic thrombocytopenic purpura)    Stage IIA lymphocyte-predominant Hodgkin's disease (HCC)    Cough    Acute bronchitis with bronchospasm    Acute non-recurrent pansinusitis    Allergic rhinitis       PAST MEDICAL HISTORY:  Past Medical History:   Diagnosis Date    Cancer [address] [address] )     hodgkin's lymphoma    ITP (idiopathic thrombocytopenic purpura)        PAST SURGICAL HISTORY:  Past Surgical History:   Procedure Laterality Date    HERNIA REPAIR      umbilical - at 7 months of age   Ty SHAY BX/REMV,LYMPH NODE,DEEP AXILL Left 9/26/2016    Procedure: AXILLARY LYMPH NODE BIOPSY;  Surgeon: Boy Ledesma MD;  [facility];  Service: Surgical Oncology    SENTINEL LYMPH NODE BIOPSY Right        FAMILY HISTORY:  No family history on file  SOCIAL HISTORY:  Social History     Social History    Marital status: Single     Spouse name: N/A    Number of children: N/A    Years of education: N/A     Occupational History    Not on file  Social History Main Topics    Smoking status: Never Smoker    Smokeless tobacco: Never Used    Alcohol use No    Drug use: No    Sexual activity: Not on file     Other Topics Concern    Not on file     Social History Narrative    No narrative on file       Review of Systems   Constitutional: Negative for activity change, chills, fatigue and fever  HENT: Positive for congestion, postnasal drip and sinus pressure  Negative for rhinorrhea, sneezing and sore throat  Eyes: Negative for discharge, redness and itching  Respiratory: Positive for cough, shortness of breath and wheezing  Negative for chest tightness  Cardiovascular: Negative for chest pain, palpitations and leg swelling  Gastrointestinal: Negative for abdominal pain, diarrhea and vomiting  Genitourinary: Negative for difficulty urinating  Musculoskeletal: Negative for arthralgias and myalgias  Skin: Negative for rash  Allergic/Immunologic: Negative for immunocompromised state  Neurological: Negative for dizziness, syncope, weakness, light-headedness and headaches  Hematological: Negative for adenopathy  Does not bruise/bleed easily     Psychiatric/Behavioral: Negative for dysphoric mood  The patient is not nervous/anxious  Objective:  Vitals:    05/01/18 1404   BP: 140/74   BP Location: Left arm   Patient Position: Sitting   Cuff Size: Adult   Pulse: 74   Resp: 16   SpO2: 98%   Weight: 92 6 kg (204 lb 3 2 oz)   Height: 6' 2" (1 88 m)        Physical Exam   Constitutional: He is oriented to person, place, and time  He appears well-developed and well-nourished  No distress  HENT:   Pale boggy nasal mucosa with was swollen inferior turbinates and increased white nasal discharge, granular injected posterior pharynx with increased white postnasal drainage, right TM within normal limits, left TM not visualized due to cerumen in canal   Eyes:   Allergic shiners present, conjunctiva injected and granular   Neck: Neck supple  Carotid bruit is not present  Cardiovascular: Normal rate, regular rhythm and normal heart sounds  Pulmonary/Chest: Effort normal and breath sounds normal  No respiratory distress  He has no wheezes  Abdominal: Soft  Bowel sounds are normal    Musculoskeletal: He exhibits no edema  Lymphadenopathy:     He has no cervical adenopathy  Neurological: He is alert and oriented to person, place, and time  Skin: Skin is warm and dry  No rash noted  Psychiatric: He has a normal mood and affect  His behavior is normal    Patient's he more apprehensive today than usual   Nursing note and vitals reviewed  RESULTS:    No results found for this or any previous visit (from the past 1008 hour(s))  This note was created with voice recognition software  Phonic, grammatical and spelling errors may be present within the note as a result

## 2018-05-10 NOTE — PROGRESS NOTES
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 and the recommendation to continue treatment with rituximab  He was treated with rituximab weekly x4 doses along with prednisone with excellent response of thrombocytopenia and anemia and later on he received rituximab 375 milligram/meter squared every 2 months finished dose 12 in March 2014  Follow-up in September 2016 showed left posterior cervical lymphadenopathy, left axillary lymphadenopathy and thrombocytopenia with platelet count [id number]       PET scan denied by the insurance, CT scan showed left greater than the right axillary lymphadenopathy, excisional biopsy of the left axillary lymph node did not identify recurrence however a 2nd opinion at [address] [place] [address] Center again recommended treatment with rituximab    Interval History:   Seasonal allergies have been acting up, otherwise, feels really well  Previous Treatment:   As mentioned above  Current treatment rituximab 375 milligram/meter squared every three-month dose 6 Out of 8 in May 2018       Test Results:    Imaging: No results found  Labs:   Lab Results   Component Value Date    WBC 7 18 02/12/2018    HGB 14 5 02/12/2018    HCT 42 6 02/12/2018    MCV 87 02/12/2018     02/12/2018     Lab Results   Component Value Date     02/12/2018    K 3 8 02/12/2018     02/12/2018    CO2 28 02/12/2018    ANIONGAP 8 02/12/2018    BUN 19 02/12/2018    CREATININE 1 11 02/12/2018    GLUCOSE 89 02/12/2018    CALCIUM 8 7 02/12/2018    AST 16 02/12/2018    ALT 20 02/12/2018    ALKPHOS 54 02/12/2018    PROT 7 4 02/12/2018    BILITOT 0 30 02/12/2018    EGFR 107 02/12/2018       No results found for: IRON, TIBC, FERRITIN    No results found for: PYZUNKMH40      ROS:   Review of Systems   All other systems reviewed and are negative  Current Medications: Reviewed  Allergies: Reviewed  PMH/FH/SH:  Reviewed      Physical Exam:    There is no height or weight on file to calculate BSA  Wt Readings from Last 3 Encounters:   05/01/18 92 6 kg (204 lb 3 2 oz)   03/01/18 94 3 kg (208 lb)   02/27/18 94 2 kg (207 lb 10 8 oz)        Temp Readings from Last 3 Encounters:   02/27/18 98 2 °F (36 8 °C) (Oral)   02/12/18 98 2 °F (36 8 °C)   11/27/17 98 1 °F (36 7 °C) (Oral)        BP Readings from Last 3 Encounters:   05/01/18 140/74   03/01/18 128/76   02/27/18 133/76         Pulse Readings from Last 3 Encounters:   05/01/18 74   03/01/18 76   02/27/18 64        Physical Exam   Constitutional: He is oriented to person, place, and time  He appears well-developed  HENT:   Head: Normocephalic  Eyes: Conjunctivae are normal  Pupils are equal, round, and reactive to light  Neck: Normal range of motion  Neck supple   No JVD present  No thyromegaly present  Cardiovascular: Normal rate, regular rhythm, normal heart sounds and intact distal pulses  Pulmonary/Chest: Effort normal and breath sounds normal    Abdominal: Soft  Bowel sounds are normal  He exhibits no distension  There is no tenderness  There is no rebound  Musculoskeletal: Normal range of motion  Neurological: He is alert and oriented to person, place, and time  No cranial nerve deficit  Skin: Skin is warm and dry  No rash noted  No erythema  Goals and Barriers:  Current Goal: Minimize effects of disease  Barriers: None  Patient's Capacity to Self Care:  Patient is able to self care

## 2018-05-14 ENCOUNTER — OFFICE VISIT (OUTPATIENT)
Dept: HEMATOLOGY ONCOLOGY | Facility: CLINIC | Age: 23
End: 2018-05-14
Payer: COMMERCIAL

## 2018-05-14 VITALS
RESPIRATION RATE: 17 BRPM | TEMPERATURE: 98.4 F | OXYGEN SATURATION: 98 % | DIASTOLIC BLOOD PRESSURE: 80 MMHG | HEART RATE: 66 BPM | WEIGHT: 205 LBS | HEIGHT: 74 IN | SYSTOLIC BLOOD PRESSURE: 118 MMHG | BODY MASS INDEX: 26.31 KG/M2

## 2018-05-14 DIAGNOSIS — C81.00: Primary | ICD-10-CM

## 2018-05-14 PROCEDURE — 99213 OFFICE O/P EST LOW 20 MIN: CPT | Performed by: PHYSICIAN ASSISTANT

## 2018-05-22 ENCOUNTER — HOSPITAL ENCOUNTER (OUTPATIENT)
Dept: INFUSION CENTER | Facility: CLINIC | Age: 23
Discharge: HOME | End: 2018-05-22
Payer: COMMERCIAL

## 2018-05-22 VITALS
HEIGHT: 74 IN | TEMPERATURE: 97.8 F | DIASTOLIC BLOOD PRESSURE: 87 MMHG | BODY MASS INDEX: 26.2 KG/M2 | RESPIRATION RATE: 18 BRPM | WEIGHT: 204.15 LBS | HEART RATE: 61 BPM | SYSTOLIC BLOOD PRESSURE: 134 MMHG

## 2018-05-22 PROCEDURE — 96375 TX/PRO/DX INJ NEW DRUG ADDON: CPT

## 2018-05-22 PROCEDURE — 96367 TX/PROPH/DG ADDL SEQ IV INF: CPT

## 2018-05-22 PROCEDURE — 96413 CHEMO IV INFUSION 1 HR: CPT

## 2018-05-22 RX ORDER — SODIUM CHLORIDE 9 MG/ML
20 INJECTION, SOLUTION INTRAVENOUS CONTINUOUS
Status: DISCONTINUED | OUTPATIENT
Start: 2018-05-22 | End: 2018-05-25 | Stop reason: HOSPADM

## 2018-05-22 RX ORDER — ACETAMINOPHEN 325 MG/1
650 TABLET ORAL ONCE
Status: COMPLETED | OUTPATIENT
Start: 2018-05-22 | End: 2018-05-22

## 2018-05-22 RX ADMIN — ACETAMINOPHEN 650 MG: 325 TABLET, FILM COATED ORAL at 10:19

## 2018-05-22 RX ADMIN — DIPHENHYDRAMINE HYDROCHLORIDE 25 MG: 50 INJECTION, SOLUTION INTRAMUSCULAR; INTRAVENOUS at 10:27

## 2018-05-22 RX ADMIN — HYDROCORTISONE SODIUM SUCCINATE 100 MG: 100 INJECTION, POWDER, FOR SOLUTION INTRAMUSCULAR; INTRAVENOUS at 10:22

## 2018-05-22 RX ADMIN — SODIUM CHLORIDE 20 ML/HR: 0.9 INJECTION, SOLUTION INTRAVENOUS at 10:19

## 2018-05-22 RX ADMIN — RITUXIMAB 800 MG: 10 INJECTION, SOLUTION INTRAVENOUS at 11:03

## 2018-05-22 NOTE — PLAN OF CARE
Problem: Potential for Falls  Goal: Patient will remain free of falls  INTERVENTIONS:  - Assess patient frequently for physical needs  -  Identify cognitive and physical deficits and behaviors that affect risk of falls    -  [place] fall precautions as indicated by assessment   - Educate patient/family on patient safety including physical limitations  - Instruct patient to call for assistance with activity based on assessment  - Modify environment to reduce risk of injury  - Consider OT/PT consult to assist with strengthening/mobility   Outcome: Progressing

## 2018-05-22 NOTE — PROGRESS NOTES
Pt tolerated rituxan rapid infusion without complications  Pt was discharged in stable condition and is aware of next appointment  AVS provided

## 2018-07-11 ENCOUNTER — OFFICE VISIT (OUTPATIENT)
Dept: INTERNAL MEDICINE CLINIC | Facility: CLINIC | Age: 23
End: 2018-07-11
Payer: COMMERCIAL

## 2018-07-11 VITALS
OXYGEN SATURATION: 99 % | DIASTOLIC BLOOD PRESSURE: 82 MMHG | HEART RATE: 68 BPM | TEMPERATURE: 98.5 F | WEIGHT: 196.4 LBS | HEIGHT: 73 IN | RESPIRATION RATE: 20 BRPM | SYSTOLIC BLOOD PRESSURE: 128 MMHG | BODY MASS INDEX: 26.03 KG/M2

## 2018-07-11 DIAGNOSIS — N60.01 BILATERAL BREAST CYSTS: Primary | ICD-10-CM

## 2018-07-11 DIAGNOSIS — N60.02 BILATERAL BREAST CYSTS: Primary | ICD-10-CM

## 2018-07-11 PROBLEM — J01.40 ACUTE NON-RECURRENT PANSINUSITIS: Status: RESOLVED | Noted: 2018-05-01 | Resolved: 2018-07-11

## 2018-07-11 PROBLEM — R05.9 COUGH: Status: RESOLVED | Noted: 2018-03-01 | Resolved: 2018-07-11

## 2018-07-11 PROBLEM — J20.9 ACUTE BRONCHITIS WITH BRONCHOSPASM: Status: RESOLVED | Noted: 2018-03-01 | Resolved: 2018-07-11

## 2018-07-11 PROCEDURE — 99213 OFFICE O/P EST LOW 20 MIN: CPT | Performed by: INTERNAL MEDICINE

## 2018-07-11 NOTE — PROGRESS NOTES
Assessment/Plan:      Reassured him that these are most likely benign but will refer him to a breast specialist to evaluate this further and discuss removal as he requested  Total time, 15 minutes, greater than 50% spent face to face in counseling and coordination of care  Return if symptoms worsen or fail to improve  No problem-specific Assessment & Plan notes found for this encounter  Diagnoses and all orders for this visit:    Bilateral breast cysts  -     Ambulatory referral to Surgical Oncology; Future          Subjective:      Patient ID: Edith Yeh  is a 20 y o  male  Patient comes in today because he has what he believes are cysts under his areola that he would like to see someone about removing  He has had these for a long time but they are becoming more prominent at this point  He notes no other masses or swelling  ALLERGIES:  Allergies   Allergen Reactions    Penicillins      As an infant - "lungs collapsed" per mom       CURRENT MEDICATIONS:  No current outpatient prescriptions on file  ACTIVE PROBLEM LIST:  Patient Active Problem List   Diagnosis    ITP (idiopathic thrombocytopenic purpura)    Stage IIA lymphocyte-predominant Hodgkin's disease ([facility] )    Allergic rhinitis       PAST MEDICAL HISTORY:  Past Medical History:   Diagnosis Date    Cancer ([facility] )     hodgkin's lymphoma    ITP (idiopathic thrombocytopenic purpura)        PAST SURGICAL HISTORY:  Past Surgical History:   Procedure Laterality Date    HERNIA REPAIR      umbilical - at 7 months of age   [employer] BX/REMV,LYMPH NODE,DEEP AXILL Left 9/26/2016    Procedure: AXILLARY LYMPH NODE BIOPSY;  Surgeon: Tamiko Brown MD;  [facility];  Service: Surgical Oncology    SENTINEL LYMPH NODE BIOPSY Right        FAMILY HISTORY:  No family history on file      SOCIAL HISTORY:  Social History     Social History    Marital status: Single     Spouse name: N/A    Number of children: N/A    Years of education: N/A     Occupational History    Not on file  Social History Main Topics    Smoking status: Never Smoker    Smokeless tobacco: Never Used    Alcohol use No    Drug use: No    Sexual activity: No     Other Topics Concern    Not on file     Social History Narrative    No narrative on file       Review of Systems      Objective:  Vitals:    07/11/18 0852   BP: 128/82   BP Location: Left arm   Patient Position: Sitting   Cuff Size: Adult   Pulse: 68   Resp: 20   Temp: 98 5 °F (36 9 °C)   TempSrc: Temporal   SpO2: 99%   Weight: 89 1 kg (196 lb 6 4 oz)   Height: 6' 1" (1 854 m)        Physical Exam   Constitutional: He appears well-developed and well-nourished  Skin:   Under each areola are small, what appeared to be cystic structures  No masses were felt  Nursing note and vitals reviewed  RESULTS:    No results found for this or any previous visit (from the past 1008 hour(s))  This note was created with voice recognition software  Phonic, grammatical and spelling errors may be present within the note as a result

## 2018-07-20 ENCOUNTER — TRANSCRIBE ORDERS (OUTPATIENT)
Dept: LAB | Facility: CLINIC | Age: 23
End: 2018-07-20

## 2018-07-20 ENCOUNTER — OFFICE VISIT (OUTPATIENT)
Dept: INTERNAL MEDICINE CLINIC | Facility: CLINIC | Age: 23
End: 2018-07-20
Payer: COMMERCIAL

## 2018-07-20 ENCOUNTER — APPOINTMENT (OUTPATIENT)
Dept: LAB | Facility: CLINIC | Age: 23
End: 2018-07-20
Payer: COMMERCIAL

## 2018-07-20 VITALS
OXYGEN SATURATION: 97 % | TEMPERATURE: 98.2 F | HEIGHT: 73 IN | RESPIRATION RATE: 20 BRPM | WEIGHT: 190.6 LBS | BODY MASS INDEX: 25.26 KG/M2 | SYSTOLIC BLOOD PRESSURE: 138 MMHG | HEART RATE: 87 BPM | DIASTOLIC BLOOD PRESSURE: 88 MMHG

## 2018-07-20 DIAGNOSIS — R39.11 URINARY HESITANCY: Primary | ICD-10-CM

## 2018-07-20 DIAGNOSIS — C81.00: ICD-10-CM

## 2018-07-20 DIAGNOSIS — R39.11 URINARY HESITANCY: ICD-10-CM

## 2018-07-20 DIAGNOSIS — C81.00 NODULAR LYMPHOCYTE PREDOMINANT HODGKIN LYMPHOMA (HCC): ICD-10-CM

## 2018-07-20 PROBLEM — N63.0 BREAST MASS: Status: ACTIVE | Noted: 2018-07-20

## 2018-07-20 LAB
ALBUMIN SERPL BCP-MCNC: 4.5 G/DL (ref 3.5–5)
ALP SERPL-CCNC: 48 U/L (ref 46–116)
ALT SERPL W P-5'-P-CCNC: 19 U/L (ref 12–78)
ANION GAP SERPL CALCULATED.3IONS-SCNC: 9 MMOL/L (ref 4–13)
AST SERPL W P-5'-P-CCNC: 13 U/L (ref 5–45)
BACTERIA UR QL AUTO: ABNORMAL /HPF
BASOPHILS # BLD AUTO: 0.06 THOUSANDS/ΜL (ref 0–0.1)
BASOPHILS NFR BLD AUTO: 1 % (ref 0–1)
BILIRUB SERPL-MCNC: 0.57 MG/DL (ref 0.2–1)
BILIRUB UR QL STRIP: NEGATIVE
BUN SERPL-MCNC: 9 MG/DL (ref 5–25)
CALCIUM SERPL-MCNC: 9.3 MG/DL (ref 8.3–10.1)
CHLORIDE SERPL-SCNC: 107 MMOL/L (ref 100–108)
CLARITY UR: CLEAR
CO2 SERPL-SCNC: 24 MMOL/L (ref 21–32)
COLOR UR: YELLOW
CREAT SERPL-MCNC: 1.15 MG/DL (ref 0.6–1.3)
EOSINOPHIL # BLD AUTO: 0.33 THOUSAND/ΜL (ref 0–0.61)
EOSINOPHIL NFR BLD AUTO: 6 % (ref 0–6)
ERYTHROCYTE [DISTWIDTH] IN BLOOD BY AUTOMATED COUNT: 12.5 % (ref 11.6–15.1)
GFR SERPL CREATININE-BSD FRML MDRD: 102 ML/MIN/1.73SQ M
GLUCOSE P FAST SERPL-MCNC: 74 MG/DL (ref 65–99)
GLUCOSE UR STRIP-MCNC: NEGATIVE MG/DL
HCT VFR BLD AUTO: 45.4 % (ref 36.5–49.3)
HGB BLD-MCNC: 15 G/DL (ref 12–17)
HGB UR QL STRIP.AUTO: ABNORMAL
IGG SERPL-MCNC: 1240 MG/DL (ref 700–1600)
IMM GRANULOCYTES # BLD AUTO: 0.01 THOUSAND/UL (ref 0–0.2)
IMM GRANULOCYTES NFR BLD AUTO: 0 % (ref 0–2)
KETONES UR STRIP-MCNC: NEGATIVE MG/DL
LDH SERPL-CCNC: 222 U/L (ref 81–234)
LEUKOCYTE ESTERASE UR QL STRIP: ABNORMAL
LYMPHOCYTES # BLD AUTO: 2.39 THOUSANDS/ΜL (ref 0.6–4.47)
LYMPHOCYTES NFR BLD AUTO: 41 % (ref 14–44)
MCH RBC QN AUTO: 29.8 PG (ref 26.8–34.3)
MCHC RBC AUTO-ENTMCNC: 33 G/DL (ref 31.4–37.4)
MCV RBC AUTO: 90 FL (ref 82–98)
MONOCYTES # BLD AUTO: 0.61 THOUSAND/ΜL (ref 0.17–1.22)
MONOCYTES NFR BLD AUTO: 10 % (ref 4–12)
NEUTROPHILS # BLD AUTO: 2.48 THOUSANDS/ΜL (ref 1.85–7.62)
NEUTS SEG NFR BLD AUTO: 42 % (ref 43–75)
NITRITE UR QL STRIP: NEGATIVE
NON-SQ EPI CELLS URNS QL MICRO: ABNORMAL /HPF
NRBC BLD AUTO-RTO: 0 /100 WBCS
PH UR STRIP.AUTO: 6 [PH] (ref 4.5–8)
PLATELET # BLD AUTO: 259 THOUSANDS/UL (ref 149–390)
PMV BLD AUTO: 11.9 FL (ref 8.9–12.7)
POTASSIUM SERPL-SCNC: 3.5 MMOL/L (ref 3.5–5.3)
PROT SERPL-MCNC: 7.8 G/DL (ref 6.4–8.2)
PROT UR STRIP-MCNC: NEGATIVE MG/DL
RBC # BLD AUTO: 5.03 MILLION/UL (ref 3.88–5.62)
RBC #/AREA URNS AUTO: ABNORMAL /HPF
SODIUM SERPL-SCNC: 140 MMOL/L (ref 136–145)
SP GR UR STRIP.AUTO: 1.02 (ref 1–1.03)
UROBILINOGEN UR QL STRIP.AUTO: 1 E.U./DL
WBC # BLD AUTO: 5.88 THOUSAND/UL (ref 4.31–10.16)
WBC #/AREA URNS AUTO: ABNORMAL /HPF

## 2018-07-20 PROCEDURE — 99213 OFFICE O/P EST LOW 20 MIN: CPT | Performed by: INTERNAL MEDICINE

## 2018-07-20 PROCEDURE — 36415 COLL VENOUS BLD VENIPUNCTURE: CPT

## 2018-07-20 PROCEDURE — 82784 ASSAY IGA/IGD/IGG/IGM EACH: CPT

## 2018-07-20 PROCEDURE — 80053 COMPREHEN METABOLIC PANEL: CPT

## 2018-07-20 PROCEDURE — 85025 COMPLETE CBC W/AUTO DIFF WBC: CPT

## 2018-07-20 PROCEDURE — 87086 URINE CULTURE/COLONY COUNT: CPT

## 2018-07-20 PROCEDURE — 83615 LACTATE (LD) (LDH) ENZYME: CPT

## 2018-07-20 PROCEDURE — 81001 URINALYSIS AUTO W/SCOPE: CPT

## 2018-07-20 NOTE — PROGRESS NOTES
Assessment/Plan:      Unclear etiology  Told him to stop his whey protein supplement and the Claritin  Increase water intake  Make sure there is nothing in his diet that he changed  Will order labs and urinalysis  Return if symptoms worsen or fail to improve  No problem-specific Assessment & Plan notes found for this encounter  Diagnoses and all orders for this visit:    Urinary hesitancy  -     Urinalysis with microscopic; Future  -     Urine culture; Future  -     CBC and differential; Future  -     Comprehensive metabolic panel; Future          Subjective:      Patient ID: Edith Yeh  is a 20 y o  male  Patient comes in today complaining of not having the urge to urinate since about Tuesday  He states he has no problem going because he has been just routinely going to the bathroom  No incontinence  No blood  No discharge  He just never feels like his bladder is full and then does not get any sensation that he has to go  He also notes he has been a little constipated  But again no bowel incontinence  He did start taking whey protein supplements again  He also started up his Claritin  He does not think he has any change in his diet  He had no back pain  He initially had a little abdominal pain but that is gone  He is presently not sexually active  His last Rituxan injection was back in May  ALLERGIES:  Allergies   Allergen Reactions    Penicillins      As an infant - "lungs collapsed" per mom       CURRENT MEDICATIONS:  No current outpatient prescriptions on file      ACTIVE PROBLEM LIST:  Patient Active Problem List   Diagnosis    ITP (idiopathic thrombocytopenic purpura)    Stage IIA lymphocyte-predominant Hodgkin's disease ([facility] )    Allergic rhinitis       PAST MEDICAL HISTORY:  Past Medical History:   Diagnosis Date    Cancer ([facility] )     hodgkin's lymphoma    ITP (idiopathic thrombocytopenic purpura)        PAST SURGICAL HISTORY:  Past Surgical History: Procedure Laterality Date    HERNIA REPAIR      umbilical - at 10months of age   Laura Ceja [place] BX/REMV,LYMPH NODE,DEEP AXILL Left 9/26/2016    Procedure: AXILLARY LYMPH NODE BIOPSY;  Surgeon: Katherine Forde MD;  [facility];  Service: Surgical Oncology    SENTINEL LYMPH NODE BIOPSY Right        FAMILY HISTORY:  No family history on file  SOCIAL HISTORY:  Social History     Social History    Marital status: Single     Spouse name: N/A    Number of children: N/A    Years of education: N/A     Occupational History    Not on file  Social History Main Topics    Smoking status: Never Smoker    Smokeless tobacco: Never Used    Alcohol use No    Drug use: No    Sexual activity: No     Other Topics Concern    Not on file     Social History Narrative    No narrative on file       Review of Systems   Respiratory: Negative for shortness of breath  Cardiovascular: Negative for chest pain  Gastrointestinal: Negative for abdominal pain  Objective:  Vitals:    07/20/18 0922   BP: 138/88   BP Location: Left arm   Patient Position: Sitting   Cuff Size: Adult   Pulse: 87   Resp: 20   Temp: 98 2 °F (36 8 °C)   TempSrc: Temporal   SpO2: 97%   Weight: 86 5 kg (190 lb 9 6 oz)   Height: 6' 1" (1 854 m)        Physical Exam   Constitutional: He is oriented to person, place, and time  He appears well-developed and well-nourished  Cardiovascular: Normal rate, regular rhythm and normal heart sounds  Pulmonary/Chest: Effort normal and breath sounds normal    Abdominal: Soft  Bowel sounds are normal    Musculoskeletal: He exhibits no edema  Neurological: He is alert and oriented to person, place, and time  Nursing note and vitals reviewed  RESULTS:    No results found for this or any previous visit (from the past 1008 hour(s))  This note was created with voice recognition software  Phonic, grammatical and spelling errors may be present within the note as a result

## 2018-07-21 LAB — BACTERIA UR CULT: NORMAL

## 2018-07-26 ENCOUNTER — OFFICE VISIT (OUTPATIENT)
Dept: SURGICAL ONCOLOGY | Facility: CLINIC | Age: 23
End: 2018-07-26
Payer: COMMERCIAL

## 2018-07-26 VITALS
BODY MASS INDEX: 25.05 KG/M2 | TEMPERATURE: 98.6 F | WEIGHT: 189 LBS | HEIGHT: 73 IN | RESPIRATION RATE: 16 BRPM | SYSTOLIC BLOOD PRESSURE: 122 MMHG | HEART RATE: 64 BPM | DIASTOLIC BLOOD PRESSURE: 82 MMHG

## 2018-07-26 DIAGNOSIS — N62 GYNECOMASTIA, MALE: ICD-10-CM

## 2018-07-26 DIAGNOSIS — N60.01 BILATERAL BREAST CYSTS: ICD-10-CM

## 2018-07-26 DIAGNOSIS — N63.0 BREAST LUMP: Primary | ICD-10-CM

## 2018-07-26 DIAGNOSIS — N60.02 BILATERAL BREAST CYSTS: ICD-10-CM

## 2018-07-26 PROCEDURE — 99243 OFF/OP CNSLTJ NEW/EST LOW 30: CPT | Performed by: SURGERY

## 2018-07-26 NOTE — LETTER
July 26, 2018     Sheela Sever, MD  [address]  [address] [address]    Patient: Nahed Meyer  YOB: 1995   Date of Visit: 7/26/2018       Dear Dr Blossom Dumont: Thank you for referring Donald Anne to me for evaluation  Below are my notes for this consultation  If you have questions, please do not hesitate to call me  I look forward to following your patient along with you  Sincerely,        Jailene Hernandez MD        CC: No Recipients  Jailene Hernandez MD  7/26/2018 10:01 AM  Sign at close encounter               Surgical Oncology Consult Note       [facility] [address] [place] [address] Ave   1995  [id number]  [facility]  [facility]  [facility]GICAL ONCOLOGY [place] [address] [address] [place] [address]      Chief Complaint:     Chief Complaint   Patient presents with    Consult     bilateral breast lumps       Assessment and Plan:   Assessment/Plan   Patient has a history of Hodgkin's disease which is responding to Rituxan therapy  He has had bilateral gynecomastia or cyst according to the patient  His recent CT shows tissue but no cyst   I reviewed this with Dr Dos Santos Marrow was recommended bilateral diagnostic mammograms  I will see the patient back in approximately 2 weeks to review his films and possible treatment options  Oncology History:      No history exists  History of Present Illness: This is a 40-year-old male who states that he has had breast cyst for many years has been stable during this time  He has also been diagnosed in the past with Hodgkin's disease is under the treatment of Dr Jermaine Claros and responding to his Rituxan  The patient is on no medications that would cause gynecomastia  Review of Systems:   Review of Systems   Constitutional: Negative for activity change, fatigue and unexpected weight change     HENT: Negative for sore throat, trouble swallowing and voice change  Eyes: Negative for visual disturbance  Respiratory: Negative for apnea, shortness of breath, wheezing and stridor  Cardiovascular: Negative for chest pain and leg swelling  Gastrointestinal: Negative for abdominal distention, abdominal pain, anal bleeding, constipation, diarrhea and vomiting  Genitourinary: Negative for dysuria, flank pain and hematuria  Musculoskeletal: Negative for arthralgias, back pain and myalgias  Skin: Negative for color change and rash  Allergic/Immunologic: Negative for immunocompromised state  Neurological: Negative for dizziness, seizures, syncope, weakness and headaches  Hematological: Negative for adenopathy  Psychiatric/Behavioral: Negative for agitation and behavioral problems  Past Medical History:      Patient Active Problem List   Diagnosis    ITP (idiopathic thrombocytopenic purpura)    Stage IIA lymphocyte-predominant Hodgkin's disease (HCC)    Allergic rhinitis    Breast lump        Past Medical History:   Diagnosis Date    [facility] )     hodgkin's lymphoma    ITP (idiopathic thrombocytopenic purpura)     Lymphoma (HCC)         Past Surgical History:   Procedure Laterality Date    HERNIA REPAIR      umbilical - at 7 months of age   [employer] [place] BX/REMV,LYMPH NODE,DEEP AXILL Left 9/26/2016    Procedure: AXILLARY LYMPH NODE BIOPSY;  Surgeon: Sarai Hooker MD;  [facility];  Service: Surgical Oncology    SENTINEL LYMPH NODE BIOPSY Right         Family History   Problem Relation Age of Onset    Breast cancer Maternal Aunt     Breast cancer Maternal Grandmother     Hodgkin's lymphoma Paternal Grandfather         Social History     Social History    Marital status: Single     Spouse name: N/A    Number of children: N/A    Years of education: N/A     Occupational History    Not on file       Social History Main Topics    Smoking status: Never Smoker    Smokeless tobacco: Never Used   [employer] Alcohol use No    Drug use: No    Sexual activity: No     Other Topics Concern    Not on file     Social History Narrative    No narrative on file        Current Outpatient Prescriptions:     RiTUXimab (RITUXAN IV), Infuse into a venous catheter, Disp: , Rfl:      Allergies   Allergen Reactions    Penicillins Hives     As an infant - "lungs collapsed" per mom       Physical Exam:     Vitals:    07/26/18 0934   BP: 122/82   Pulse: 64   Resp: 16   Temp: 98 6 °F (37 °C)     Physical Exam   Pulmonary/Chest:   Examination of the breast demonstrate minimal breast tissue on clinical examination  I do not appreciate a dominant masses  There is no axillary adenopathy  Results:   Pathology:  Not applicable  Imaging  I reviewed his CT scan, I reviewed also with Dr Randall Metz who states that there is a fair amount of breast tissue there  Discussion/Summary:   I recommended bilateral diagnostic mammogram to evaluate the quantity of breast tissue better after my discussion with Dr Randall Metz  I am under impressed clinically  Removing the breast as a treatment for gynecomastia does have its down sides  There can be cosmetic changes with indention is as well as chronic pain though this is typically mild  All this was reviewed with the patient  I will see him back following his mammograms  Advance Care Planning/Advance Directives:  I discussed the disease status, treatment plans and follow-up with the patient

## 2018-07-26 NOTE — PROGRESS NOTES
Surgical Oncology Consult Note       [facility]  [facility]  [address]  1995  [id number]  [place]  [address]  [place]  [facility]  [address] [address]      Chief Complaint:     Chief Complaint   Patient presents with    Consult     bilateral breast lumps       Assessment and Plan:   Assessment/Plan   Patient has a history of Hodgkin's disease which is responding to Rituxan therapy  He has had bilateral gynecomastia or cyst according to the patient  His recent CT shows tissue but no cyst   I reviewed this with Dr Tomlinson Pale was recommended bilateral diagnostic mammograms  I will see the patient back in approximately 2 weeks to review his films and possible treatment options  Oncology History:      No history exists  History of Present Illness: This is a 47-year-old male who states that he has had breast cyst for many years has been stable during this time  He has also been diagnosed in the past with Hodgkin's disease is under the treatment of Dr Eric Duncan and responding to his Rituxan  The patient is on no medications that would cause gynecomastia  Review of Systems:   Review of Systems   Constitutional: Negative for activity change, fatigue and unexpected weight change  HENT: Negative for sore throat, trouble swallowing and voice change  Eyes: Negative for visual disturbance  Respiratory: Negative for apnea, shortness of breath, wheezing and stridor  Cardiovascular: Negative for chest pain and leg swelling  Gastrointestinal: Negative for abdominal distention, abdominal pain, anal bleeding, constipation, diarrhea and vomiting  Genitourinary: Negative for dysuria, flank pain and hematuria  Musculoskeletal: Negative for arthralgias, back pain and myalgias  Skin: Negative for color change and rash  Allergic/Immunologic: Negative for immunocompromised state  Neurological: Negative for dizziness, seizures, syncope, weakness and headaches  Hematological: Negative for adenopathy  Psychiatric/Behavioral: Negative for agitation and behavioral problems  Past Medical History:      Patient Active Problem List   Diagnosis    ITP (idiopathic thrombocytopenic purpura)    Stage IIA lymphocyte-predominant Hodgkin's disease (HCC)    Allergic rhinitis    Breast lump        Past Medical History:   Diagnosis Date    Cancer ([facility] )     hodgkin's lymphoma    ITP (idiopathic thrombocytopenic purpura)     Lymphoma (HCC)         Past Surgical History:   Procedure Laterality Date    HERNIA REPAIR      umbilical - at 7 months of age   [place] BX/REMV,LYMPH NODE,DEEP AXILL Left 9/26/2016    Procedure: AXILLARY LYMPH NODE BIOPSY;  Surgeon: Catherine Jade MD;  [facility];  Service: Surgical Oncology    SENTINEL LYMPH NODE BIOPSY Right         Family History   Problem Relation Age of Onset    Breast cancer Maternal Aunt     Breast cancer Maternal Grandmother     Hodgkin's lymphoma Paternal Grandfather         Social History     Social History    Marital status: Single     Spouse name: N/A    Number of children: N/A    Years of education: N/A     Occupational History    Not on file       Social History Main Topics    Smoking status: Never Smoker    Smokeless tobacco: Never Used    Alcohol use No    Drug use: No    Sexual activity: No     Other Topics Concern    Not on file     Social History Narrative    No narrative on file        Current Outpatient Prescriptions:     RiTUXimab (RITUXAN IV), Infuse into a venous catheter, Disp: , Rfl:      Allergies   Allergen Reactions    Penicillins Hives     As an infant - "lungs collapsed" per mom       Physical Exam:     Vitals:    07/26/18 0934   BP: 122/82   Pulse: 64   Resp: 16   Temp: 98 6 °F (37 °C)     Physical Exam   Pulmonary/Chest:   Examination of the breast demonstrate minimal breast tissue on clinical examination  I do not appreciate a dominant masses  There is no axillary adenopathy  Results:   Pathology:  Not applicable  Imaging  I reviewed his CT scan, I reviewed also with Dr Silver Friday who states that there is a fair amount of breast tissue there  Discussion/Summary:   I recommended bilateral diagnostic mammogram to evaluate the quantity of breast tissue better after my discussion with Dr Silver Friday  I am under impressed clinically  Removing the breast as a treatment for gynecomastia does have its down sides  There can be cosmetic changes with indention is as well as chronic pain though this is typically mild  All this was reviewed with the patient  I will see him back following his mammograms  Advance Care Planning/Advance Directives:  I discussed the disease status, treatment plans and follow-up with the patient

## 2018-07-27 ENCOUNTER — HOSPITAL ENCOUNTER (OUTPATIENT)
Dept: MAMMOGRAPHY | Facility: CLINIC | Age: 23
Discharge: HOME | End: 2018-07-27
Payer: COMMERCIAL

## 2018-07-27 DIAGNOSIS — N62 GYNECOMASTIA, MALE: ICD-10-CM

## 2018-07-27 PROCEDURE — 77066 DX MAMMO INCL CAD BI: CPT

## 2018-07-30 ENCOUNTER — TELEPHONE (OUTPATIENT)
Dept: UROLOGY | Facility: AMBULATORY SURGERY CENTER | Age: 23
End: 2018-07-30

## 2018-07-30 ENCOUNTER — OFFICE VISIT (OUTPATIENT)
Dept: INTERNAL MEDICINE CLINIC | Facility: CLINIC | Age: 23
End: 2018-07-30
Payer: COMMERCIAL

## 2018-07-30 VITALS
SYSTOLIC BLOOD PRESSURE: 136 MMHG | HEART RATE: 62 BPM | DIASTOLIC BLOOD PRESSURE: 88 MMHG | OXYGEN SATURATION: 97 % | WEIGHT: 186.2 LBS | RESPIRATION RATE: 20 BRPM | HEIGHT: 73 IN | BODY MASS INDEX: 24.68 KG/M2 | TEMPERATURE: 97.6 F

## 2018-07-30 DIAGNOSIS — K62.5 RECTAL BLEEDING: ICD-10-CM

## 2018-07-30 DIAGNOSIS — N39.41 URGENCY INCONTINENCE: ICD-10-CM

## 2018-07-30 DIAGNOSIS — K59.00 CONSTIPATION, UNSPECIFIED CONSTIPATION TYPE: ICD-10-CM

## 2018-07-30 DIAGNOSIS — R39.15 URINARY URGENCY: Primary | ICD-10-CM

## 2018-07-30 DIAGNOSIS — R13.10 DYSPHAGIA, UNSPECIFIED TYPE: ICD-10-CM

## 2018-07-30 PROCEDURE — 99214 OFFICE O/P EST MOD 30 MIN: CPT | Performed by: PHYSICIAN ASSISTANT

## 2018-07-30 NOTE — PATIENT INSTRUCTIONS
Will have patient seen by both GI and urology  Will have CT abdomen and pelvis done  Will discuss results when available

## 2018-07-30 NOTE — PROGRESS NOTES
Assessment/Plan:   Patient Instructions     Will have patient seen by both GI and urology  Will have CT abdomen and pelvis done  Will discuss results when available  Return if symptoms worsen or fail to improve  Diagnoses and all orders for this visit:    Urinary urgency  -     CT abdomen pelvis wo contrast; Future  -     Ambulatory referral to Urology; Future    Urgency incontinence  -     CT abdomen pelvis wo contrast; Future  -     Ambulatory referral to Urology; Future    Dysphagia, unspecified type  -     CT abdomen pelvis wo contrast; Future  -     Ambulatory referral to Gastroenterology; Future    Constipation, unspecified constipation type  -     CT abdomen pelvis wo contrast; Future  -     Ambulatory referral to Gastroenterology; Future    Rectal bleeding  -     CT abdomen pelvis wo contrast; Future  -     Ambulatory referral to Gastroenterology; Future          Subjective:      Patient ID: Carolynn Mai  is a 20 y o  male  Acute visit-multiple concerns    Patient made appointment today because he thought he saw a lump in his left lower side of his abdomen  He was concerned about a hernia  However yesterday he had a day where he had difficulty swallowing both solids and liquids  It has continued today  He also has concerns due to constipation for the past 2 weeks  He states his stools are small in hard and in fact has noted some bleeding on the toilet paper and in the toilet water on 2 occasions  He did note 1 painful bowel movement approximately 2 weeks ago  No nausea or vomiting, no fever chills  Patient also continues with urinary urgency  He states he has had 1 episode of urgency incontinence  Previous urinalysis and culture were sense unremarkable  Patient does have history of Hodgkin's disease          ALLERGIES:  Allergies   Allergen Reactions    Penicillins Hives     As an infant - "lungs collapsed" per mom       CURRENT MEDICATIONS:  No current outpatient prescriptions on file  ACTIVE PROBLEM LIST:  Patient Active Problem List   Diagnosis    ITP (idiopathic thrombocytopenic purpura)    Stage IIA lymphocyte-predominant Hodgkin's disease ([facility] )    Allergic rhinitis    Breast lump    Urinary urgency    Urgency incontinence    Dysphagia    Constipation    Rectal bleeding       PAST MEDICAL HISTORY:  Past Medical History:   Diagnosis Date    Cancer ([facility] )     hodgkin's lymphoma    ITP (idiopathic thrombocytopenic purpura)     Lymphoma ([facility] )        PAST SURGICAL HISTORY:  Past Surgical History:   Procedure Laterality Date    HERNIA REPAIR      umbilical - at 7 months of age   [facility] [place] BX/REMV,LYMPH NODE,DEEP AXILL Left 9/26/2016    Procedure: AXILLARY LYMPH NODE BIOPSY;  Surgeon: Kali Kamara MD;  [facility];  Service: Surgical Oncology    SENTINEL LYMPH NODE BIOPSY Right        FAMILY HISTORY:  Family History   Problem Relation Age of Onset    Breast cancer Maternal Aunt     Breast cancer Maternal Grandmother     Hodgkin's lymphoma Paternal Grandfather        SOCIAL HISTORY:  Social History     Social History    Marital status: Single     Spouse name: N/A    Number of children: N/A    Years of education: N/A     Occupational History    Not on file  Social History Main Topics    Smoking status: Never Smoker    Smokeless tobacco: Never Used    Alcohol use No    Drug use: No    Sexual activity: No     Other Topics Concern    Not on file     Social History Narrative    No narrative on file       Review of Systems   Constitutional: Negative for activity change, chills, fatigue and fever  HENT: Negative for congestion  Eyes: Negative for discharge  Respiratory: Negative for cough, chest tightness and shortness of breath  Cardiovascular: Negative for chest pain, palpitations and leg swelling  Gastrointestinal: Positive for anal bleeding, blood in stool and constipation   Negative for abdominal pain, diarrhea, nausea and vomiting  Genitourinary: Positive for frequency and urgency  Negative for difficulty urinating  Musculoskeletal: Negative for arthralgias and myalgias  Skin: Negative for rash  Allergic/Immunologic: Negative for immunocompromised state  Neurological: Negative for dizziness, syncope, weakness, light-headedness and headaches  Hematological: Negative for adenopathy  Does not bruise/bleed easily  Psychiatric/Behavioral: Negative for dysphoric mood  The patient is not nervous/anxious  Objective:  Vitals:    07/30/18 0928   BP: 136/88   BP Location: Left arm   Patient Position: Sitting   Cuff Size: Adult   Pulse: 62   Resp: 20   Temp: 97 6 °F (36 4 °C)   TempSrc: Temporal   SpO2: 97%   Weight: 84 5 kg (186 lb 3 2 oz)   Height: 6' 1" (1 854 m)        Physical Exam   Constitutional: He is oriented to person, place, and time  He appears well-developed and well-nourished  No distress  Neck: Neck supple  No thyromegaly present  Cardiovascular: Normal rate, regular rhythm and normal heart sounds  Pulmonary/Chest: Effort normal and breath sounds normal    Abdominal: Soft  Bowel sounds are normal  He exhibits no distension and no mass  There is no tenderness  There is no rebound and no guarding  Genitourinary: Penis normal    Genitourinary Comments: Do not appreciate any hernias at this time   Musculoskeletal: He exhibits no edema  Lymphadenopathy:        Head (right side): No submental, no submandibular, no tonsillar, no preauricular, no posterior auricular and no occipital adenopathy present  Head (left side): No submental, no submandibular, no tonsillar, no preauricular, no posterior auricular and no occipital adenopathy present  He has no cervical adenopathy  He has no axillary adenopathy  Right: No inguinal, no supraclavicular and no epitrochlear adenopathy present  Left: No inguinal, no supraclavicular and no epitrochlear adenopathy present     Neurological: He is alert and oriented to person, place, and time  Skin: Skin is warm and dry  No rash noted  Psychiatric: He has a normal mood and affect  His behavior is normal    Nursing note and vitals reviewed          RESULTS:    Recent Results (from the past 1008 hour(s))   CBC and differential    Collection Time: 07/20/18  9:44 AM   Result Value Ref Range    WBC 5 88 4 31 - 10 16 Thousand/uL    RBC 5 03 3 88 - 5 62 Million/uL    Hemoglobin 15 0 12 0 - 17 0 g/dL    Hematocrit 45 4 36 5 - 49 3 %    MCV 90 82 - 98 fL    MCH 29 8 26 8 - 34 3 pg    MCHC 33 0 31 4 - 37 4 g/dL    RDW 12 5 11 6 - 15 1 %    MPV 11 9 8 9 - 12 7 fL    Platelets 393 080 - 596 Thousands/uL    nRBC 0 /100 WBCs    Neutrophils Relative 42 (L) 43 - 75 %    Immat GRANS % 0 0 - 2 %    Lymphocytes Relative 41 14 - 44 %    Monocytes Relative 10 4 - 12 %    Eosinophils Relative 6 0 - 6 %    Basophils Relative 1 0 - 1 %    Neutrophils Absolute 2 48 1 85 - 7 62 Thousands/µL    Immature Grans Absolute 0 01 0 00 - 0 20 Thousand/uL    Lymphocytes Absolute 2 39 0 60 - 4 47 Thousands/µL    Monocytes Absolute 0 61 0 17 - 1 22 Thousand/µL    Eosinophils Absolute 0 33 0 00 - 0 61 Thousand/µL    Basophils Absolute 0 06 0 00 - 0 10 Thousands/µL   Comprehensive metabolic panel    Collection Time: 07/20/18  9:44 AM   Result Value Ref Range    Sodium 140 136 - 145 mmol/L    Potassium 3 5 3 5 - 5 3 mmol/L    Chloride 107 100 - 108 mmol/L    CO2 24 21 - 32 mmol/L    Anion Gap 9 4 - 13 mmol/L    BUN 9 5 - 25 mg/dL    Creatinine 1 15 0 60 - 1 30 mg/dL    Glucose, Fasting 74 65 - 99 mg/dL    Calcium 9 3 8 3 - 10 1 mg/dL    AST 13 5 - 45 U/L    ALT 19 12 - 78 U/L    Alkaline Phosphatase 48 46 - 116 U/L    Total Protein 7 8 6 4 - 8 2 g/dL    Albumin 4 5 3 5 - 5 0 g/dL    Total Bilirubin 0 57 0 20 - 1 00 mg/dL    eGFR 102 ml/min/1 73sq m   IgG    Collection Time: 07/20/18  9:44 AM   Result Value Ref Range    IGG 1,240 0 700 0-1,600 0 mg/dL   LD,Blood    Collection Time: 07/20/18 9:44 AM   Result Value Ref Range     81 - 234 U/L   Urinalysis with microscopic    Collection Time: 07/20/18  9:44 AM   Result Value Ref Range    Clarity, UA Clear     Color, UA Yellow     Specific [place], UA 1 023 1 003 - 1 030    pH, UA 6 0 4 5 - 8 0    Glucose, UA Negative Negative mg/dl    Ketones, UA Negative Negative mg/dl    Blood, UA Trace (A) Negative    Protein, UA Negative Negative mg/dl    Nitrite, UA Negative Negative    Bilirubin, UA Negative Negative    Urobilinogen, UA 1 0 0 2, 1 0 E U /dl E U /dl    Leukocytes, UA Trace (A) Negative    WBC, UA 4-10 (A) None Seen, 0-5, 5-55, 5-65 /hpf    RBC, UA None Seen None Seen, 0-5 /hpf    Bacteria, UA None Seen None Seen, Occasional /hpf    Epithelial Cells None Seen None Seen, Occasional /hpf   Urine culture    Collection Time: 07/20/18  9:44 AM   Result Value Ref Range    Urine Culture [id number] cfu/ml         This note was created with voice recognition software  Phonic, grammatical and spelling errors may be present within the note as a result

## 2018-07-30 NOTE — TELEPHONE ENCOUNTER
Reason for appointment/Complaint/Diagnosis : Urgency incontinence    Insurance: [employer]    History of Cancer? NO    Previous urologist?  NO             Records requested/where? NO, in Epic    Outside testing/where? No    Location Preference for office visit?  Wanted [place] but there were no appt soon enough was willing to come to [facility] [place] then

## 2018-07-31 ENCOUNTER — DOCUMENTATION (OUTPATIENT)
Dept: INTERNAL MEDICINE CLINIC | Facility: CLINIC | Age: 23
End: 2018-07-31

## 2018-07-31 ENCOUNTER — OFFICE VISIT (OUTPATIENT)
Dept: GASTROENTEROLOGY | Facility: CLINIC | Age: 23
End: 2018-07-31
Payer: COMMERCIAL

## 2018-07-31 VITALS
DIASTOLIC BLOOD PRESSURE: 80 MMHG | SYSTOLIC BLOOD PRESSURE: 118 MMHG | HEART RATE: 72 BPM | WEIGHT: 166.25 LBS | BODY MASS INDEX: 21.93 KG/M2

## 2018-07-31 DIAGNOSIS — K62.5 RECTAL BLEEDING: ICD-10-CM

## 2018-07-31 DIAGNOSIS — R13.10 DYSPHAGIA, UNSPECIFIED TYPE: ICD-10-CM

## 2018-07-31 DIAGNOSIS — K92.1 BLOOD IN STOOL: Primary | ICD-10-CM

## 2018-07-31 DIAGNOSIS — K59.04 CHRONIC IDIOPATHIC CONSTIPATION: Primary | ICD-10-CM

## 2018-07-31 DIAGNOSIS — K59.00 CONSTIPATION, UNSPECIFIED CONSTIPATION TYPE: ICD-10-CM

## 2018-07-31 PROCEDURE — 99204 OFFICE O/P NEW MOD 45 MIN: CPT | Performed by: INTERNAL MEDICINE

## 2018-07-31 RX ORDER — POLYETHYLENE GLYCOL 3350, SODIUM CHLORIDE, SODIUM BICARBONATE, POTASSIUM CHLORIDE 420; 11.2; 5.72; 1.48 G/4L; G/4L; G/4L; G/4L
4000 POWDER, FOR SOLUTION ORAL ONCE
Qty: 4000 ML | Refills: 0 | Status: SHIPPED | OUTPATIENT
Start: 2018-07-31 | End: 2018-11-05

## 2018-07-31 NOTE — PROGRESS NOTES
Consultation - [facility]  Shannon Anurag  1995 20 y o  male     ASSESSMENT @ PLAN:   He is a 78-year-old male with a history of non-Hodgkin's lymphoma in remission with severe constipation over the last 2 weeks with likely internal hemorrhoid bleeding  1 I told him to drink a bottle of magnesium citrate tonight    2 will do colonoscopy to investigate given his malignancy history     Chief Complaint:  Constipation and rectal bleeding    HPI:  He is a 78-year-old male in with an episode of rectal bleeding on Friday after having a hard bowel movement with straining  He has been severely constipated for approximately 2 weeks  He reports hardness of stools with straining a ports infrequent stools  He reports burping he reports abdominal discomfort  He did a Dulcolax tablet and it did not really help  He has no nausea vomiting fevers he has no family history of Crohn's disease ulcerative colitis or colon cancer  He did have non-Hodgkin's lymphoma 2 years ago that was treated and is in remission ink cured  REVIEW OF SYSTEMS:     CONSTITUTIONAL: Denies any fever, chills, or rigors  Good appetite, and no recent weight loss  HEENT: No earache or tinnitus  Denies hearing loss or visual disturbances  CARDIOVASCULAR: No chest pain or palpitations  RESPIRATORY: Denies any cough, hemoptysis, shortness of breath or dyspnea on exertion  GASTROINTESTINAL: As noted in the History of Present Illness  GENITOURINARY: No problems with urination  Denies any hematuria or dysuria  NEUROLOGIC: No dizziness or vertigo, denies headaches  MUSCULOSKELETAL: Denies any muscle or joint pain  SKIN: Denies skin rashes or itching  ENDOCRINE: Denies excessive thirst  Denies intolerance to heat or cold  PSYCHOSOCIAL: Denies depression or anxiety  Denies any recent memory loss       Past Medical History:   Diagnosis Date    Cancer ([facility] [facility] )     hodgkin's lymphoma    ITP (idiopathic thrombocytopenic purpura)     Lymphoma ([facility]       Past Surgical History:   Procedure Laterality Date    HERNIA REPAIR      umbilical - at 7 months of age   Krishan Mak CT BX/REMV,LYMPH NODE,DEEP AXILL Left 9/26/2016    Procedure: AXILLARY LYMPH NODE BIOPSY;  Surgeon: Celsa Breen MD;  [facility];  Service: Surgical Oncology    SENTINEL LYMPH NODE BIOPSY Right      Social History     Social History    Marital status: Single     Spouse name: N/A    Number of children: N/A    Years of education: N/A     Occupational History    Not on file  Social History Main Topics    Smoking status: Never Smoker    Smokeless tobacco: Never Used    Alcohol use No    Drug use: No    Sexual activity: No     Other Topics Concern    Not on file     Social History Narrative    No narrative on file     Family History   Problem Relation Age of Onset    Breast cancer Maternal Aunt     Breast cancer Maternal Grandmother     Hodgkin's lymphoma Paternal Grandfather      Penicillins  No current outpatient prescriptions on file  No current facility-administered medications for this visit  Blood pressure 118/80, pulse 72, weight 75 4 kg (166 lb 4 oz)  PHYSICAL EXAM:     General Appearance:   Alert, cooperative, no distress, appears stated age    HEENT:   Normocephalic, atraumatic, anicteric      Neck:  Supple, symmetrical, trachea midline, no adenopathy;    thyroid: no enlargement/tenderness/nodules; no carotid  bruit or JVD    Lungs:   Clear to auscultation bilaterally; no rales, rhonchi or wheezing; respirations unlabored    Heart[de-identified]   S1 and S2 normal; regular rate and rhythm; no murmur, rub, or gallop     Abdomen:   Soft, non-tender, non-distended; normal bowel sounds; no masses, no organomegaly    Genitalia:   Deferred    Rectal:   Deferred    Extremities:  No cyanosis, clubbing or edema    Pulses:  2+ and symmetric all extremities    Skin:  Skin color, texture, turgor normal, no rashes or lesions    Lymph nodes:  No palpable cervical, axillary or inguinal lymphadenopathy        Lab Results   Component Value Date    WBC 5 88 07/20/2018    HGB 15 0 07/20/2018    HCT 45 4 07/20/2018    MCV 90 07/20/2018     07/20/2018     Lab Results   Component Value Date    GLUCOSE 89 02/12/2018    CALCIUM 9 3 07/20/2018     07/20/2018    K 3 5 07/20/2018    CO2 24 07/20/2018     07/20/2018    BUN 9 07/20/2018    CREATININE 1 15 07/20/2018     Lab Results   Component Value Date    ALT 19 07/20/2018    AST 13 07/20/2018    ALKPHOS 48 07/20/2018    BILITOT 0 57 07/20/2018     No results found for: INR, PROTIME

## 2018-07-31 NOTE — LETTER
July 31, 2018     Jacob Boone MD  [address]  [address] [place] [address]    Patient: Meagan Lynn  YOB: 1995   Date of Visit: 7/31/2018       Dear Dr Amber Nj: Thank you for referring Jodie Zavala to me for evaluation  Below are my notes for this consultation  If you have questions, please do not hesitate to call me  I look forward to following your patient along with you  Sincerely,        Eligio Choi MD        CC: No Recipients  Eligio Choi MD  7/31/2018  1:22 PM  Sign at close encounter  Consultation - [facility]  Meagan Lynn  1995 20 y o  male     ASSESSMENT @ PLAN:   He is a 78-year-old male with a history of non-Hodgkin's lymphoma in remission with severe constipation over the last 2 weeks with likely internal hemorrhoid bleeding  1 I told him to drink a bottle of magnesium citrate tonight    2 will do colonoscopy to investigate given his malignancy history     Chief Complaint:  Constipation and rectal bleeding    HPI:  He is a 78-year-old male in with an episode of rectal bleeding on Friday after having a hard bowel movement with straining  He has been severely constipated for approximately 2 weeks  He reports hardness of stools with straining a ports infrequent stools  He reports burping he reports abdominal discomfort  He did a Dulcolax tablet and it did not really help  He has no nausea vomiting fevers he has no family history of Crohn's disease ulcerative colitis or colon cancer  He did have non-Hodgkin's lymphoma 2 years ago that was treated and is in remission ink cured  REVIEW OF SYSTEMS:     CONSTITUTIONAL: Denies any fever, chills, or rigors  Good appetite, and no recent weight loss  HEENT: No earache or tinnitus  Denies hearing loss or visual disturbances  CARDIOVASCULAR: No chest pain or palpitations     RESPIRATORY: Denies any cough, hemoptysis, shortness of breath or dyspnea on exertion  GASTROINTESTINAL: As noted in the History of Present Illness  GENITOURINARY: No problems with urination  Denies any hematuria or dysuria  NEUROLOGIC: No dizziness or vertigo, denies headaches  MUSCULOSKELETAL: Denies any muscle or joint pain  SKIN: Denies skin rashes or itching  ENDOCRINE: Denies excessive thirst  Denies intolerance to heat or cold  PSYCHOSOCIAL: Denies depression or anxiety  Denies any recent memory loss  Past Medical History:   Diagnosis Date    Cancer ([place] [address] )     hodgkin's lymphoma    ITP (idiopathic thrombocytopenic purpura)     Lymphoma ([facility])       Past Surgical History:   Procedure Laterality Date    HERNIA REPAIR      umbilical - at 7 months of age   [facility] [place] BX/REMV,LYMPH NODE,DEEP AXILL Left 9/26/2016    Procedure: AXILLARY LYMPH NODE BIOPSY;  Surgeon: Francisco Pardo MD;  [facility];  Service: Surgical Oncology    SENTINEL LYMPH NODE BIOPSY Right      Social History     Social History    Marital status: Single     Spouse name: N/A    Number of children: N/A    Years of education: N/A     Occupational History    Not on file  Social History Main Topics    Smoking status: Never Smoker    Smokeless tobacco: Never Used    Alcohol use No    Drug use: No    Sexual activity: No     Other Topics Concern    Not on file     Social History Narrative    No narrative on file     Family History   Problem Relation Age of Onset    Breast cancer Maternal Aunt     Breast cancer Maternal Grandmother     Hodgkin's lymphoma Paternal Grandfather      Penicillins  No current outpatient prescriptions on file  No current facility-administered medications for this visit  Blood pressure 118/80, pulse 72, weight 75 4 kg (166 lb 4 oz)      PHYSICAL EXAM:     General Appearance:   Alert, cooperative, no distress, appears stated age    HEENT:   Normocephalic, atraumatic, anicteric      Neck:  Supple, symmetrical, trachea midline, no adenopathy;    thyroid: no enlargement/tenderness/nodules; no carotid  bruit or JVD    Lungs:   Clear to auscultation bilaterally; no rales, rhonchi or wheezing; respirations unlabored    Heart[de-identified]   S1 and S2 normal; regular rate and rhythm; no murmur, rub, or gallop     Abdomen:   Soft, non-tender, non-distended; normal bowel sounds; no masses, no organomegaly    Genitalia:   Deferred    Rectal:   Deferred    Extremities:  No cyanosis, clubbing or edema    Pulses:  2+ and symmetric all extremities    Skin:  Skin color, texture, turgor normal, no rashes or lesions    Lymph nodes:  No palpable cervical, axillary or inguinal lymphadenopathy        Lab Results   Component Value Date    WBC 5 88 07/20/2018    HGB 15 0 07/20/2018    HCT 45 4 07/20/2018    MCV 90 07/20/2018     07/20/2018     Lab Results   Component Value Date    GLUCOSE 89 02/12/2018    CALCIUM 9 3 07/20/2018     07/20/2018    K 3 5 07/20/2018    CO2 24 07/20/2018     07/20/2018    BUN 9 07/20/2018    CREATININE 1 15 07/20/2018     Lab Results   Component Value Date    ALT 19 07/20/2018    AST 13 07/20/2018    ALKPHOS 48 07/20/2018    BILITOT 0 57 07/20/2018     No results found for: INR, PROTIME

## 2018-08-01 ENCOUNTER — HOSPITAL ENCOUNTER (OUTPATIENT)
Dept: CT IMAGING | Facility: CLINIC | Age: 23
Discharge: HOME | End: 2018-08-01
Payer: COMMERCIAL

## 2018-08-01 ENCOUNTER — TRANSCRIBE ORDERS (OUTPATIENT)
Dept: RADIOLOGY | Facility: CLINIC | Age: 23
End: 2018-08-01

## 2018-08-01 DIAGNOSIS — R39.15 URINARY URGENCY: ICD-10-CM

## 2018-08-01 DIAGNOSIS — K59.00 CONSTIPATION, UNSPECIFIED CONSTIPATION TYPE: ICD-10-CM

## 2018-08-01 DIAGNOSIS — R13.10 DYSPHAGIA, UNSPECIFIED TYPE: ICD-10-CM

## 2018-08-01 DIAGNOSIS — K62.5 RECTAL BLEEDING: ICD-10-CM

## 2018-08-01 DIAGNOSIS — N39.41 URGENCY INCONTINENCE: ICD-10-CM

## 2018-08-01 PROCEDURE — 74176 CT ABD & PELVIS W/O CONTRAST: CPT

## 2018-08-06 ENCOUNTER — OFFICE VISIT (OUTPATIENT)
Dept: HEMATOLOGY ONCOLOGY | Facility: CLINIC | Age: 23
End: 2018-08-06
Payer: COMMERCIAL

## 2018-08-06 VITALS
OXYGEN SATURATION: 98 % | TEMPERATURE: 97.3 F | HEIGHT: 74 IN | HEART RATE: 75 BPM | RESPIRATION RATE: 18 BRPM | DIASTOLIC BLOOD PRESSURE: 80 MMHG | BODY MASS INDEX: 23.81 KG/M2 | SYSTOLIC BLOOD PRESSURE: 120 MMHG | WEIGHT: 185.5 LBS

## 2018-08-06 DIAGNOSIS — D69.3 IDIOPATHIC THROMBOCYTOPENIC PURPURA (HCC): ICD-10-CM

## 2018-08-06 DIAGNOSIS — C81.00: Primary | ICD-10-CM

## 2018-08-06 PROBLEM — N62 GYNECOMASTIA: Status: ACTIVE | Noted: 2018-08-06

## 2018-08-06 PROCEDURE — 99214 OFFICE O/P EST MOD 30 MIN: CPT | Performed by: INTERNAL MEDICINE

## 2018-08-06 NOTE — PROGRESS NOTES
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 however no evidence of bone marrow involvement, no constitutional symptoms when he presented with thrombocytopenia and lymphadenopathy he was treated with single agent rituximab with good response in February 2011 and received maintenance rituximab every 2 months finished 12 doses in March 2014   In September 2016 he had thrombocytopenia with recurrence of axillary lymphadenopathy, biopsy did not confirm lymphocyte-predominant Hodgkin lymphoma but 2nd opinion at [facility] recommended restarting rituximab, currently on every three-month maintenance therapy proceed with dose 7  Out of 8 and follow up in 3 months with CBC, LDH          HPI:  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, no evidence of bone marrow involvement no constitutional symptoms when he presented with immune thrombocytopenic purpura and anemia in February 2011  He is status post excisional axillary lymph node biopsy   He was seen for a 2nd opinion at [facility] and the recommendation to continue treatment with rituximab      He was treated with rituximab weekly x4 doses along with prednisone with excellent response of thrombocytopenia and anemia and later on he received rituximab 375 milligram/meter squared every 2 months finished dose 12 in March 2014      Follow-up in September 2016 showed left posterior cervical lymphadenopathy, left axillary lymphadenopathy and thrombocytopenia with platelet count [id number]       PET scan denied by the insurance, CT scan showed left greater than the right axillary lymphadenopathy, excisional biopsy of the left axillary lymph node did not identify recurrence however a 2nd opinion at [facility] again recommended treatment with rituximab     Interval History:        Previous Treatment:         Test Results:    Imaging: Ct Abdomen Pelvis Wo Contrast    Result Date: 8/3/2018  Narrative: CT ABDOMEN AND PELVIS WITHOUT IV CONTRAST INDICATION:   R39 15: Urgency of urination N39 41: Urge incontinence R13 10: Dysphagia, unspecified K59 00: Constipation, unspecified K62 5: Hemorrhage of anus and rectum  COMPARISON:  CT abdomen and pelvis on 5/2/2017  TECHNIQUE:  CT examination of the abdomen and pelvis was performed without intravenous contrast   Axial, sagittal, and coronal 2D reformatted images were created from the source data and submitted for interpretation  Radiation dose length product (DLP) for this visit:  594 8 mGy-cm   This examination, like all CT scans performed in the [facility], was performed utilizing techniques to minimize radiation dose exposure, including the use of iterative reconstruction and automated exposure control  Enteric contrast was administered  FINDINGS: ABDOMEN LOWER CHEST:  No clinically significant abnormality identified in the visualized lower chest  LIVER/BILIARY TREE:  Unremarkable  GALLBLADDER:  No calcified gallstones  No pericholecystic inflammatory change  SPLEEN:  Unremarkable  PANCREAS:  Unremarkable  ADRENAL GLANDS:  Unremarkable  KIDNEYS/URETERS:  Unremarkable  No hydronephrosis  STOMACH AND BOWEL:  No dilated small or large bowel loops to suggest obstruction  The appendix is unremarkable  Small amount of contrast in the distal esophagus, suggestive of reflux  APPENDIX:  A normal appendix was visualized  ABDOMINOPELVIC CAVITY:  No ascites or free intraperitoneal air  No lymphadenopathy  VESSELS:  Unremarkable for patient's age  PELVIS REPRODUCTIVE ORGANS:  Unremarkable for patient's age  URINARY BLADDER:  Unremarkable  ABDOMINAL WALL/INGUINAL REGIONS:  Unremarkable  OSSEOUS STRUCTURES:  No acute fracture or destructive osseous lesion  Grade 1 retrolisthesis of L5  Impression: No acute intra-abdominal pathology to explain abdominal pain  Findings suggestive of gastroesophageal reflux disease  Workstation performed: [id number]     Mammo Diagnostic Bilateral W Cad    Result Date: 7/27/2018  Narrative: Patient History: Patient has history of other cancer at age 19 and had previous chemotherapy at age 19  Family history of breast cancer in maternal grandmother  Patient has never smoked  Patient's BMI is 25 1  Reason for exam: addl eval requested from prior study  Evaluate prominent retroareolar identified on recent CT scan  Mammo Diagnostic Bilateral W CAD: July 27, 2018 - Check In #: [de-identified] Bilateral CC and MLO view(s) were taken  Technologist: LORIE Cee The breast tissue is heterogeneously dense, potentially limiting the sensitivity of mammography  Patient risk, included in this report, assists in determining the appropriate screening regimen (such as 3-D mammography or the inclusion of automated breast ultrasound or MRI)  3-D mammography may also remain indicated as screening  These images were obtained using digital technique and with the assistance of Computer Aided Detection  There are no dominant masses, foci of architectural distortion or suspicious clusters of calcification to suggest malignancy  The visualized skin and nipples appear normal  A significant amount of symmetric retroareolar tissue is present consistent with moderate symmetric nodular gynecomastia  Medications that could potentially have gynecomastia as a side effect are at least partially listed at the following website: [email or web address]/  [email or web address] IMPRESSION ACR BI-RADS® Assessments: BiRad:2 - Benign Recommendation: Clinical management of both breasts  Analyzed by CAD The patient is scheduled in a reminder system for screening mammography  8-10% of cancers will be missed on mammography   Management of a palpable abnormality must be based on clinical grounds  Patients will be notified of their results via letter from our facility  Accredited by [employer] of [employer] and FDA  Transcription Location: [address] [place] [address] [place]: [id number] Risk Value(s): Myriad Table: 1 5%      Labs:   Lab Results   Component Value Date    WBC 5 88 07/20/2018    HGB 15 0 07/20/2018    HCT 45 4 07/20/2018    MCV 90 07/20/2018     07/20/2018     Lab Results   Component Value Date     07/20/2018    K 3 5 07/20/2018     07/20/2018    CO2 24 07/20/2018    ANIONGAP 9 07/20/2018    BUN 9 07/20/2018    CREATININE 1 15 07/20/2018    GLUCOSE 89 02/12/2018    GLUF 74 07/20/2018    CALCIUM 9 3 07/20/2018    AST 13 07/20/2018    ALT 19 07/20/2018    ALKPHOS 48 07/20/2018    PROT 7 8 07/20/2018    BILITOT 0 57 07/20/2018    EGFR 102 07/20/2018       No results found for: IRON, TIBC, FERRITIN    No results found for: [id number]      ROS:   Review of Systems   Constitutional: Negative for activity change, appetite change, chills, diaphoresis, fatigue, fever and unexpected weight change  HENT: Negative for congestion, dental problem, facial swelling, hearing loss, mouth sores, nosebleeds, postnasal drip, rhinorrhea, sore throat, trouble swallowing and voice change  Eyes: Negative for photophobia, pain, discharge, redness, itching and visual disturbance  Respiratory: Negative for cough, choking, chest tightness, shortness of breath and wheezing  Cardiovascular: Negative for chest pain, palpitations and leg swelling  Gastrointestinal: Negative for abdominal distention, abdominal pain, anal bleeding, blood in stool, constipation, diarrhea, nausea, rectal pain and vomiting  Endocrine: Negative for cold intolerance and heat intolerance  Genitourinary: Negative for decreased urine volume, difficulty urinating, dysuria, flank pain, frequency, hematuria and urgency     Musculoskeletal: Negative for arthralgias, back pain, gait problem, joint swelling, myalgias, neck pain and neck stiffness  Skin: Negative for color change, pallor, rash and wound  Allergic/Immunologic: Negative for immunocompromised state  Neurological: Negative for dizziness, tremors, seizures, syncope, facial asymmetry, speech difficulty, weakness, light-headedness, numbness and headaches  Hematological: Negative for adenopathy  Does not bruise/bleed easily  Psychiatric/Behavioral: Negative for agitation, confusion, decreased concentration, dysphoric mood and sleep disturbance  The patient is not nervous/anxious  All other systems reviewed and are negative  Current Medications: Reviewed  Allergies: Reviewed  PMH/FH/SH:  Reviewed      Physical Exam:    Body surface area is 2 1 meters squared  Wt Readings from Last 3 Encounters:   08/06/18 84 1 kg (185 lb 8 oz)   07/31/18 75 4 kg (166 lb 4 oz)   07/30/18 84 5 kg (186 lb 3 2 oz)        Temp Readings from Last 3 Encounters:   08/06/18 (!) 97 3 °F (36 3 °C) (Tympanic)   07/30/18 97 6 °F (36 4 °C) (Temporal)   07/26/18 98 6 °F (37 °C)        BP Readings from Last 3 Encounters:   08/06/18 120/80   07/31/18 118/80   07/30/18 136/88         Pulse Readings from Last 3 Encounters:   08/06/18 75   07/31/18 72   07/30/18 62        Physical Exam   Constitutional: He is oriented to person, place, and time  He appears well-developed and well-nourished  No distress  HENT:   Head: Normocephalic and atraumatic  Mouth/Throat: Oropharynx is clear and moist  No oropharyngeal exudate  Eyes: Conjunctivae and EOM are normal  Pupils are equal, round, and reactive to light  Neck: Normal range of motion  Neck supple  No tracheal deviation present  No thyromegaly present  Cardiovascular: Normal rate and regular rhythm  Exam reveals no gallop and no friction rub  No murmur heard  Pulmonary/Chest: Effort normal and breath sounds normal  No respiratory distress  He has no wheezes  He has no rales  He exhibits no tenderness     Abdominal: Soft  Bowel sounds are normal  He exhibits no distension and no mass  There is no tenderness  There is no rebound and no guarding  Musculoskeletal: Normal range of motion  He exhibits no edema  Lymphadenopathy:     He has no cervical adenopathy  Neurological: He is alert and oriented to person, place, and time  Skin: Skin is warm and dry  No rash noted  He is not diaphoretic  No erythema  No pallor  Psychiatric: He has a normal mood and affect  His behavior is normal  Judgment and thought content normal    Vitals reviewed  Goals and Barriers:  Current Goal: Minimize effects of disease  Barriers: None  Patient's Capacity to Self Care:  Patient is able to self care      Code Status: @[facility]@

## 2018-08-09 ENCOUNTER — OFFICE VISIT (OUTPATIENT)
Dept: SURGICAL ONCOLOGY | Facility: CLINIC | Age: 23
End: 2018-08-09
Payer: COMMERCIAL

## 2018-08-09 VITALS
BODY MASS INDEX: 24.13 KG/M2 | TEMPERATURE: 97.8 F | DIASTOLIC BLOOD PRESSURE: 62 MMHG | WEIGHT: 188 LBS | HEIGHT: 74 IN | SYSTOLIC BLOOD PRESSURE: 100 MMHG | RESPIRATION RATE: 16 BRPM | HEART RATE: 60 BPM

## 2018-08-09 DIAGNOSIS — N62 GYNECOMASTIA: Primary | ICD-10-CM

## 2018-08-09 PROCEDURE — 99213 OFFICE O/P EST LOW 20 MIN: CPT | Performed by: SURGERY

## 2018-08-09 NOTE — LETTER
August 9, 2018     Meharn Butterfield MD  [address]  [address]  [address] [address]    Patient: Trina Altamirano  YOB: 1995   Date of Visit: 8/9/2018       Dear Dr Matthew Garcia: Thank you for referring Ligia Crooks to me for evaluation  Below are my notes for this consultation  If you have questions, please do not hesitate to call me  I look forward to following your patient along with you  Sincerely,        Taya Moses MD        CC: No Recipients  Taya Moses MD  8/9/2018 11:51 AM  Sign at close encounter     Surgical Oncology Follow Up       [address]   1995  [id number]  [place]  39  [facility]  [facility] [address] [address]    Chief Complaint   Patient presents with    Follow-up     mammo 7/27/2018          Assessment & Plan:   Patient presents for a follow-up visit after having is mammogram   His mammogram demonstrated bilateral gynecomastia  There are no worrisome findings other than this  Patient relates that the gynecomastia continues to trouble him  Of his gynecomastia is unknown  The etiology I have reviewed this with Dr Rossana Raines who was willing to evaluate him for possible surgical treatment of his gynecomastia  Cancer History:      No history exists  Lymphocyte predominant stage IIA Hodgkin's lymphoma treated initially with single agent rituximab in 2011 followed by maintenance rituximab  Lorriane Lat is recommended restarting him on his rituximab secondary to recurrent axillary adenopathy  Interval History:   The patient presents here for an opinion regarding bilateral gynecomastia  This has been a problem for him for some time as outlined by our previous note  His mammogram demonstrated considerable gynecomastia  There are no worrisome findings of malignancy    I have reviewed this with Dr Nick Tate was willing to evaluate him for possible surgical treatment of his gynecomastia  Review of Systems:   Review of Systems   All other systems reviewed and are negative  Past Medical History     Patient Active Problem List   Diagnosis    [facility]    Stage IIA lymphocyte-predominant Hodgkin's disease ([facility]    Allergic rhinitis    Breast lump    Urinary urgency    Urgency incontinence    Dysphagia    Constipation    Rectal bleeding    Blood in stool    Gynecomastia     Past Medical History:   Diagnosis Date    Cancer ([facility] )     hodgkin's lymphoma    ITP (idiopathic thrombocytopenic purpura)     Lymphoma (HCC)      Past Surgical History:   Procedure Laterality Date    HERNIA REPAIR      umbilical - at 7 months of age   Laura Ceja [place] BX/REMV,LYMPH NODE,DEEP AXILL Left 9/26/2016    Procedure: AXILLARY LYMPH NODE BIOPSY;  Surgeon: Katherine Forde MD;  [facility];  Service: Surgical Oncology    SENTINEL LYMPH NODE BIOPSY Right      Family History   Problem Relation Age of Onset    Breast cancer Maternal Aunt     Breast cancer Maternal Grandmother     Hodgkin's lymphoma Paternal Grandfather      Social History     Social History    Marital status: Single     Spouse name: N/A    Number of children: N/A    Years of education: N/A     Occupational History    Not on file       Social History Main Topics    Smoking status: Never Smoker    Smokeless tobacco: Never Used    Alcohol use No    Drug use: No    Sexual activity: No     Other Topics Concern    Not on file     Social History Narrative    No narrative on file       Current Outpatient Prescriptions:     polyethylene glycol-electrolytes (NULYTELY) 4000 mL solution, Take 4,000 mL by mouth once for 1 dose, Disp: 4000 mL, Rfl: 0  Allergies   Allergen Reactions    Penicillins Hives     As an infant - "lungs collapsed" per mom       Physical Exam:     Vitals:    08/09/18 1130   BP: 100/62   Pulse: 60   Resp: 16   Temp: 97 8 °F (36 6 °C)     Physical Exam   Pulmonary/Chest:   Bilateral gynecomastia  No dominant masses  I do not appreciate any adenopathy  Results/discussion:   I personally reviewed his mammogram I concur with the result  I explained the patient that clinically and radiographically he has relatively significant gynecomastia  I have asked him to see Dr Pb Valentino our plastic surgeon for consideration for bilateral subcutaneous mastectomies to resolve this issue for the patient  We will coordinate that visit for him  We will see him back on a as needed basis  All questions were answered the patient's satisfaction  Advance Care Planning/Advance Directives:  I discussed the disease status, treatment plans and follow-up with the patient

## 2018-08-09 NOTE — PROGRESS NOTES
Surgical Oncology Follow Up       [facility]  [facility] [place] [address] [address]   1995  [id number]  [place]  39  [place]  [facility] [address]    Chief Complaint   Patient presents with    Follow-up     mammo 7/27/2018          Assessment & Plan:   Patient presents for a follow-up visit after having is mammogram   His mammogram demonstrated bilateral gynecomastia  There are no worrisome findings other than this  Patient relates that the gynecomastia continues to trouble him  Of his gynecomastia is unknown  The etiology I have reviewed this with Dr Ady Gu who was willing to evaluate him for possible surgical treatment of his gynecomastia  Cancer History:      No history exists  Lymphocyte predominant stage IIA Hodgkin's lymphoma treated initially with single agent rituximab in 2011 followed by maintenance rituximab  Trang Mccullough is recommended restarting him on his rituximab secondary to recurrent axillary adenopathy  Interval History:   The patient presents here for an opinion regarding bilateral gynecomastia  This has been a problem for him for some time as outlined by our previous note  His mammogram demonstrated considerable gynecomastia  There are no worrisome findings of malignancy  I have reviewed this with Dr Ady Gu was willing to evaluate him for possible surgical treatment of his gynecomastia  Review of Systems:   Review of Systems   All other systems reviewed and are negative        Past Medical History     Patient Active Problem List   Diagnosis    [facility]    Stage IIA lymphocyte-predominant Hodgkin's disease ([facility]    Allergic rhinitis    Breast lump    Urinary urgency    Urgency incontinence    Dysphagia    Constipation    Rectal bleeding    Blood in stool    Gynecomastia     Past Medical History:   Diagnosis Date    Cancer ([address] )     hodgkin's lymphoma    ITP (idiopathic thrombocytopenic purpura)     Lymphoma (HCC)      Past Surgical History:   Procedure Laterality Date    HERNIA REPAIR      umbilical - at 7 months of age   Rosa Lovett [place] BX/REMV,LYMPH NODE,DEEP AXILL Left 9/26/2016    Procedure: AXILLARY LYMPH NODE BIOPSY;  Surgeon: Golden Barahona MD;  [facility];  Service: Surgical Oncology    SENTINEL LYMPH NODE BIOPSY Right      Family History   Problem Relation Age of Onset    Breast cancer Maternal Aunt     Breast cancer Maternal Grandmother     Hodgkin's lymphoma Paternal Grandfather      Social History     Social History    Marital status: Single     Spouse name: N/A    Number of children: N/A    Years of education: N/A     Occupational History    Not on file  Social History Main Topics    Smoking status: Never Smoker    Smokeless tobacco: Never Used    Alcohol use No    Drug use: No    Sexual activity: No     Other Topics Concern    Not on file     Social History Narrative    No narrative on file       Current Outpatient Prescriptions:     polyethylene glycol-electrolytes (NULYTELY) 4000 mL solution, Take 4,000 mL by mouth once for 1 dose, Disp: 4000 mL, Rfl: 0  Allergies   Allergen Reactions    Penicillins Hives     As an infant - "lungs collapsed" per mom       Physical Exam:     Vitals:    08/09/18 1130   BP: 100/62   Pulse: 60   Resp: 16   Temp: 97 8 °F (36 6 °C)     Physical Exam   Pulmonary/Chest:   Bilateral gynecomastia  No dominant masses  I do not appreciate any adenopathy  Results/discussion:   I personally reviewed his mammogram I concur with the result  I explained the patient that clinically and radiographically he has relatively significant gynecomastia  I have asked him to see Dr Nancy Richardson our plastic surgeon for consideration for bilateral subcutaneous mastectomies to resolve this issue for the patient   We will coordinate that visit for him  We will see him back on a as needed basis  All questions were answered the patient's satisfaction  Advance Care Planning/Advance Directives:  I discussed the disease status, treatment plans and follow-up with the patient

## 2018-08-14 ENCOUNTER — HOSPITAL ENCOUNTER (OUTPATIENT)
Dept: INFUSION CENTER | Facility: CLINIC | Age: 23
Discharge: HOME | End: 2018-08-14
Payer: COMMERCIAL

## 2018-08-14 VITALS
DIASTOLIC BLOOD PRESSURE: 74 MMHG | TEMPERATURE: 97.7 F | HEART RATE: 67 BPM | RESPIRATION RATE: 18 BRPM | WEIGHT: 188.71 LBS | SYSTOLIC BLOOD PRESSURE: 132 MMHG | BODY MASS INDEX: 25.01 KG/M2 | HEIGHT: 73 IN

## 2018-08-14 PROCEDURE — 96375 TX/PRO/DX INJ NEW DRUG ADDON: CPT

## 2018-08-14 PROCEDURE — 96367 TX/PROPH/DG ADDL SEQ IV INF: CPT

## 2018-08-14 PROCEDURE — 96413 CHEMO IV INFUSION 1 HR: CPT

## 2018-08-14 RX ORDER — ACETAMINOPHEN 325 MG/1
650 TABLET ORAL ONCE
Status: COMPLETED | OUTPATIENT
Start: 2018-08-14 | End: 2018-08-14

## 2018-08-14 RX ORDER — SODIUM CHLORIDE 9 MG/ML
20 INJECTION, SOLUTION INTRAVENOUS CONTINUOUS
Status: DISCONTINUED | OUTPATIENT
Start: 2018-08-14 | End: 2018-08-17 | Stop reason: HOSPADM

## 2018-08-14 RX ADMIN — RITUXIMAB 800 MG: 10 INJECTION, SOLUTION INTRAVENOUS at 11:49

## 2018-08-14 RX ADMIN — SODIUM CHLORIDE 20 ML/HR: 0.9 INJECTION, SOLUTION INTRAVENOUS at 10:54

## 2018-08-14 RX ADMIN — ACETAMINOPHEN 650 MG: 325 TABLET, FILM COATED ORAL at 10:55

## 2018-08-14 RX ADMIN — HYDROCORTISONE SODIUM SUCCINATE 100 MG: 100 INJECTION, POWDER, FOR SOLUTION INTRAMUSCULAR; INTRAVENOUS at 10:56

## 2018-08-14 RX ADMIN — DIPHENHYDRAMINE HYDROCHLORIDE 25 MG: 50 INJECTION, SOLUTION INTRAMUSCULAR; INTRAVENOUS at 11:01

## 2018-08-14 NOTE — PROGRESS NOTES
Pt reports to infusion center for maintenance rituxan  Saw Dr Bessie Guadarrama for urinary issues (no urge to urinate, 2 episodes of incontinence, drinking enough fluids, discolored urine but unable to describe, no pain/feeling on urination)  UA and urine culture were somewhat inconclusive  Pt is not on antibiotic therapy, but is seeing a urologist 8/23/18  Call made to Dr Jeremy Varela RN to make aware  Blood work results and physician notes were reviewed by Dr Chip Rios, and pt is ok to proceed with Rituxan today

## 2018-08-14 NOTE — PROGRESS NOTES
Pt tolerated treatment well without complications  Discharged in stable condition and aware of next appointment  AVS provided

## 2018-08-14 NOTE — PLAN OF CARE
Problem: Potential for Falls  Goal: Patient will remain free of falls  INTERVENTIONS:  - Assess patient frequently for physical needs  -  Identify cognitive and physical deficits and behaviors that affect risk of falls    -  [place] fall precautions as indicated by assessment   - Educate patient/family on patient safety including physical limitations  - Instruct patient to call for assistance with activity based on assessment  - Modify environment to reduce risk of injury  - Consider OT/PT consult to assist with strengthening/mobility   Outcome: Progressing

## 2018-08-23 ENCOUNTER — TELEPHONE (OUTPATIENT)
Dept: GASTROENTEROLOGY | Facility: CLINIC | Age: 23
End: 2018-08-23

## 2018-09-18 ENCOUNTER — OFFICE VISIT (OUTPATIENT)
Dept: UROLOGY | Facility: CLINIC | Age: 23
End: 2018-09-18
Payer: COMMERCIAL

## 2018-09-18 VITALS
SYSTOLIC BLOOD PRESSURE: 140 MMHG | DIASTOLIC BLOOD PRESSURE: 80 MMHG | WEIGHT: 192 LBS | HEIGHT: 74 IN | HEART RATE: 76 BPM | BODY MASS INDEX: 24.64 KG/M2

## 2018-09-18 DIAGNOSIS — R39.15 URINARY URGENCY: Primary | ICD-10-CM

## 2018-09-18 DIAGNOSIS — N39.41 URGENCY INCONTINENCE: ICD-10-CM

## 2018-09-18 LAB
POST-VOID RESIDUAL VOLUME, ML POC: 0 ML
SL AMB  POCT GLUCOSE, UA: NORMAL
SL AMB LEUKOCYTE ESTERASE,UA: NORMAL
SL AMB POCT BLOOD,UA: NORMAL
SL AMB POCT CLARITY,UA: CLEAR
SL AMB POCT COLOR,UA: YELLOW
SL AMB POCT KETONES,UA: NORMAL
SL AMB POCT NITRITE,UA: NORMAL
SL AMB POCT PH,UA: 6
SL AMB POCT SPECIFIC GRAVITY,UA: 1.01
SL AMB POCT URINE PROTEIN: NORMAL

## 2018-09-18 PROCEDURE — 81002 URINALYSIS NONAUTO W/O SCOPE: CPT | Performed by: PHYSICIAN ASSISTANT

## 2018-09-18 PROCEDURE — 99203 OFFICE O/P NEW LOW 30 MIN: CPT | Performed by: PHYSICIAN ASSISTANT

## 2018-09-18 PROCEDURE — 51798 US URINE CAPACITY MEASURE: CPT | Performed by: PHYSICIAN ASSISTANT

## 2018-09-18 NOTE — PROGRESS NOTES
1  Urinary urgency  Ambulatory referral to Urology    POCT Measure PVR    POCT urine dip   2  Urgency incontinence  Ambulatory referral to Urology         Assessment and plan:       1  Lower urinary tract symptoms  - I reviewed with the patient that his physical examination does not reveal any genitourinary abnormalities  We also reviewed that he is demonstrating excellent bladder emptying in the office today with 0mL postvoid residual   - we discussed management of urinary frequency through dietary and behavioral modifications  Specifically we discussed hydration with water and minimizing bladder irritants  We reviewed timed and double voiding  We also discussed management of bowel movements in order to avoid constipation  - we briefly discussed treatment of urinary urgency and frequency with anticholinergics versus beta 3 agonist   Patient's symptoms are not severe enough at this time to initiate pharmacotherapy however  - patient is to contact us should his urinary system comes worsen or persist         Jasper Miller PA-C      Chief Complaint     Chief Complaint   Patient presents with    Urinary Urgency    Urinary Incontinence         History of Present Illness     Shannon Osborn  is a 20 y o  male presenting today as a new patient in regards to urinary urgency and frequency  Patient states that he had a fall approximately 2 months ago onto his perineal region  Since he has noticed decreased urinary sensation  He had 1 episode of urinary urge incontinence  He feels like he is going to the bathroom urgently episode  He denies any gross hematuria, dysuria, flank pain, nausea, vomiting, fevers, or chills  Patient does not feel like he is completely emptying his bladder after urination  Patient had a CT of the abdomen and pelvis 08/01/2018 which was negative for any upper or lower urinary tract lesions      Patient does have a significant past medical history Hodgkin's lymphoma which has been managed by Carolyn Schrader  He also has bilateral gynecomastia and is undergoing evaluation for possible surgical treatment  Postvoid residual in the office today reveals 0 mL  Laboratory     Lab Results   Component Value Date    CREATININE 1 15 07/20/2018       Review of Systems     Review of Systems   Constitutional: Negative for activity change, appetite change, chills, diaphoresis, fatigue, fever and unexpected weight change  Respiratory: Negative for chest tightness and shortness of breath  Cardiovascular: Negative for chest pain, palpitations and leg swelling  Gastrointestinal: Negative for abdominal distention, abdominal pain, constipation, diarrhea, nausea and vomiting  Genitourinary: Negative for decreased urine volume, difficulty urinating, dysuria, enuresis, flank pain, frequency, genital sores, hematuria and urgency  Musculoskeletal: Negative for back pain, gait problem and myalgias  Skin: Negative for color change, pallor, rash and wound  Psychiatric/Behavioral: Negative for behavioral problems  The patient is not nervous/anxious  AUA SYMPTOM SCORE      Most Recent Value   AUA SYMPTOM SCORE   How often have you had a sensation of not emptying your bladder completely after you finished urinating? 4   How often have you had to urinate again less than two hours after you finished urinating? 4   How often have you found you stopped and started again several times when you urinate? 1   How often have you found it difficult to postpone urination? 0   How often have you had a weak urinary stream?  0   How often have you had to push or strain to begin urination? 0   How many times did you most typically get up to urinate from the time you went to bed at night until the time you got up in the morning?   1   Quality of Life: If you were to spend the rest of your life with your urinary condition just the way it is now, how would you feel about that?  5   AUA SYMPTOM SCORE  10 Allergies     Allergies   Allergen Reactions    Penicillins Hives     As an infant - "lungs collapsed" per mom       Physical Exam     Physical Exam   Constitutional: He is oriented to person, place, and time  He appears well-developed and well-nourished  No distress  HENT:   Head: Normocephalic and atraumatic  Eyes: Conjunctivae are normal    Neck: Normal range of motion  No tracheal deviation present  Pulmonary/Chest: Effort normal    Abdominal: Hernia confirmed negative in the right inguinal area and confirmed negative in the left inguinal area  Genitourinary: Testes normal and penis normal  Cremasteric reflex is present  Right testis shows no mass, no swelling and no tenderness  Left testis shows no mass, no swelling and no tenderness  No phimosis, paraphimosis, hypospadias, penile erythema or penile tenderness  No discharge found  Musculoskeletal: Normal range of motion  He exhibits no edema or deformity  Neurological: He is alert and oriented to person, place, and time  Skin: Skin is warm and dry  No rash noted  He is not diaphoretic  No erythema  Psychiatric: He has a normal mood and affect   His behavior is normal          Vital Signs     Vitals:    09/18/18 1129   BP: 140/80   Pulse: 76   Weight: 87 1 kg (192 lb)   Height: 6' 2" (1 88 m)         Current Medications       Current Outpatient Prescriptions:     polyethylene glycol-electrolytes (NULYTELY) 4000 mL solution, Take 4,000 mL by mouth once for 1 dose, Disp: 4000 mL, Rfl: 0      Active Problems     Patient Active Problem List   Diagnosis    Idiopathic thrombocytopenic purpura (HCC)    Stage IIA lymphocyte-predominant Hodgkin's disease ([facility] [address] )    Allergic rhinitis    Breast lump    Urinary urgency    Urgency incontinence    Dysphagia    Constipation    Rectal bleeding    Blood in stool    Gynecomastia         Past Medical History     Past Medical History:   Diagnosis Date    Cancer [facility])     hodgkin's lymphoma    ITP (idiopathic thrombocytopenic purpura)     Lymphoma [facility] [address] )          Surgical History     Past Surgical History:   Procedure Laterality Date    HERNIA REPAIR      umbilical - at 7 months of age   Ty Oswald UT BX/REMV,LYMPH NODE,DEEP AXILL Left 9/26/2016    Procedure: AXILLARY LYMPH NODE BIOPSY;  Surgeon: Boy Ledesma MD;  [facility];  Service: Surgical Oncology    SENTINEL LYMPH NODE BIOPSY Right          Family History     Family History   Problem Relation Age of Onset    Breast cancer Maternal Aunt     Breast cancer Maternal Grandmother     Hodgkin's lymphoma Paternal Grandfather          Social History     Social History       Radiology     CT ABDOMEN AND PELVIS WITHOUT IV CONTRAST     INDICATION:   R39 15: Urgency of urination  N39 41: Urge incontinence  R13 10: Dysphagia, unspecified  K59 00: Constipation, unspecified  K62 5: Hemorrhage of anus and rectum      COMPARISON:  CT abdomen and pelvis on 5/2/2017      TECHNIQUE:  CT examination of the abdomen and pelvis was performed without intravenous contrast   Axial, sagittal, and coronal 2D reformatted images were created from the source data and submitted for interpretation       Radiation dose length product (DLP) for this visit:  594 8 mGy-cm   This examination, like all CT scans performed in the [facility], was performed utilizing techniques to minimize radiation dose exposure, including the use of iterative   reconstruction and automated exposure control       Enteric contrast was administered       FINDINGS:     ABDOMEN     LOWER CHEST:  No clinically significant abnormality identified in the visualized lower chest      LIVER/BILIARY TREE:  Unremarkable      GALLBLADDER:  No calcified gallstones  No pericholecystic inflammatory change      SPLEEN:  Unremarkable      PANCREAS:  Unremarkable      ADRENAL GLANDS:  Unremarkable      KIDNEYS/URETERS:  Unremarkable   No hydronephrosis      STOMACH AND BOWEL:  No dilated small or large bowel loops to suggest obstruction  The appendix is unremarkable  Small amount of contrast in the distal esophagus, suggestive of reflux      APPENDIX:  A normal appendix was visualized      ABDOMINOPELVIC CAVITY:  No ascites or free intraperitoneal air  No lymphadenopathy      VESSELS:  Unremarkable for patient's age      PELVIS     REPRODUCTIVE ORGANS:  Unremarkable for patient's age      URINARY BLADDER:  Unremarkable      ABDOMINAL WALL/INGUINAL REGIONS:  Unremarkable      OSSEOUS STRUCTURES:  No acute fracture or destructive osseous lesion  Grade 1 retrolisthesis of L5      IMPRESSION:     No acute intra-abdominal pathology to explain abdominal pain      Findings suggestive of gastroesophageal reflux disease

## 2018-10-19 ENCOUNTER — OFFICE VISIT (OUTPATIENT)
Dept: INTERNAL MEDICINE CLINIC | Facility: CLINIC | Age: 23
End: 2018-10-19
Payer: COMMERCIAL

## 2018-10-19 VITALS
WEIGHT: 190 LBS | HEIGHT: 74 IN | SYSTOLIC BLOOD PRESSURE: 118 MMHG | HEART RATE: 74 BPM | BODY MASS INDEX: 24.38 KG/M2 | DIASTOLIC BLOOD PRESSURE: 72 MMHG | OXYGEN SATURATION: 98 % | RESPIRATION RATE: 16 BRPM

## 2018-10-19 DIAGNOSIS — R33.9: Primary | ICD-10-CM

## 2018-10-19 DIAGNOSIS — Z23 NEED FOR VACCINATION: ICD-10-CM

## 2018-10-19 PROCEDURE — 3008F BODY MASS INDEX DOCD: CPT | Performed by: PHYSICIAN ASSISTANT

## 2018-10-19 PROCEDURE — 90471 IMMUNIZATION ADMIN: CPT

## 2018-10-19 PROCEDURE — 99213 OFFICE O/P EST LOW 20 MIN: CPT | Performed by: PHYSICIAN ASSISTANT

## 2018-10-19 PROCEDURE — 90686 IIV4 VACC NO PRSV 0.5 ML IM: CPT

## 2018-10-19 NOTE — PROGRESS NOTES
Assessment/Plan:   Patient Instructions   Will discuss with Urology  Will give referral to Urology at this time  Return for Next scheduled follow up  Diagnoses and all orders for this visit:    Cannot urinate  Comments:  inability to feel need to urinate   Orders:  -     Ambulatory referral to Urology; Future    Need for vaccination  -     influenza vaccine, [id number], quadrivalent, 0 5 mL, preservative-free (SYRINGE, SINGLE-DOSE VIAL), for adult and pediatric patients 3 yr+ (AFLURIA, FLUARIX, FLULAVAL, FLUZONE)          Subjective:      Patient ID: Corona Chow  is a 20 y o  male  Patient presents today with complaint of not being able to tell when he has to urinate  According to the patient he believes in mid July he was working out at [employer], tripped over the weight on a bar bell, then fell landing straddled directly on the bar  For several days after the patient had significant discomfort in that area and noted some difficulty urinating along with some difficulty obtaining an erection  Since then he can obtain an erection without difficulty however he is not having any more pain but he still does not have the sensation he needs to urinate  At this time he is reminding himself to urinate every 2 hr  He is not having any incontinence by doing this  He denies any back pain, no testicular pain, no fever or chills  Denies any abdominal pain or GI symptoms          ALLERGIES:  Allergies   Allergen Reactions    Penicillins Hives     As an infant - "lungs collapsed" per mom       CURRENT MEDICATIONS:    Current Outpatient Prescriptions:     polyethylene glycol-electrolytes (NULYTELY) 4000 mL solution, Take 4,000 mL by mouth once for 1 dose, Disp: 4000 mL, Rfl: 0    ACTIVE PROBLEM LIST:  Patient Active Problem List   Diagnosis    Idiopathic [facility]ocytopen[facility] purpura [facility] [address] )    Stage IIA lymphocyte-predominant Hodgkin's disease ([facility] [address] )    Allergic rhinitis    Breast lump    Urinary urgency    Urgency incontinence    Dysphagia    Constipation    Rectal bleeding    Blood in stool    Gynecomastia       PAST MEDICAL HISTORY:  Past Medical History:   Diagnosis Date    Cancer ([facility] )     hodgkin's lymphoma    ITP (idiopathic thrombocytopenic purpura)     Lymphoma ([facility] )        PAST SURGICAL HISTORY:  Past Surgical History:   Procedure Laterality Date    HERNIA REPAIR      umbilical - at 7 months of age   Anay Farias ID BX/REMV,LYMPH NODE,DEEP AXILL Left 9/26/2016    Procedure: AXILLARY LYMPH NODE BIOPSY;  Surgeon: Derrell Liu MD;  [facility];  Service: Surgical Oncology    SENTINEL LYMPH NODE BIOPSY Right        FAMILY HISTORY:  Family History   Problem Relation Age of Onset    Breast cancer Maternal Aunt     Breast cancer Maternal Grandmother     Hodgkin's lymphoma Paternal Grandfather        SOCIAL HISTORY:  Social History     Social History    Marital status: Single     Spouse name: N/A    Number of children: N/A    Years of education: N/A     Occupational History    Not on file  Social History Main Topics    Smoking status: Never Smoker    Smokeless tobacco: Never Used    Alcohol use No    Drug use: No    Sexual activity: No     Other Topics Concern    Not on file     Social History Narrative    No narrative on file       Review of Systems   Constitutional: Negative for activity change, chills, fatigue and fever  HENT: Negative for congestion  Eyes: Negative for discharge  Respiratory: Negative for cough, chest tightness and shortness of breath  Cardiovascular: Negative for chest pain, palpitations and leg swelling  Gastrointestinal: Negative for abdominal pain  Genitourinary: Positive for difficulty urinating  Negative for decreased urine volume, discharge, dysuria, flank pain, frequency, genital sores, hematuria, penile pain, penile swelling, scrotal swelling, testicular pain and urgency  Musculoskeletal: Negative for arthralgias and myalgias  Skin: Negative for rash  Allergic/Immunologic: Negative for immunocompromised state  Neurological: Negative for dizziness, syncope, weakness, light-headedness and headaches  Hematological: Negative for adenopathy  Does not bruise/bleed easily  Psychiatric/Behavioral: Negative for dysphoric mood  The patient is not nervous/anxious  Objective:  Vitals:    10/19/18 1332   BP: 118/72   BP Location: Left arm   Patient Position: Sitting   Cuff Size: Adult   Pulse: 74   Resp: 16   SpO2: 98%   Weight: 86 2 kg (190 lb)   Height: 6' 2" (1 88 m)        Physical Exam   Constitutional: He is oriented to person, place, and time  He appears well-developed and well-nourished  No distress  Cardiovascular: Normal rate, regular rhythm and normal heart sounds  Pulmonary/Chest: Effort normal and breath sounds normal    Abdominal: Soft  Bowel sounds are normal  He exhibits no mass  There is no tenderness  There is no guarding  Genitourinary: Penis normal  No penile tenderness  Musculoskeletal: He exhibits no edema  No palpable pelvic tenderness or tenderness of pubic rami  Neurological: He is alert and oriented to person, place, and time  He has normal reflexes  He displays normal reflexes  No cranial nerve deficit  He exhibits normal muscle tone  Coordination normal    Skin: Skin is warm and dry  No rash noted  Psychiatric: He has a normal mood and affect  His behavior is normal    Nursing note and vitals reviewed          RESULTS:    Recent Results (from the past 1008 hour(s))   POCT Measure PVR    Collection Time: 09/18/18 11:32 AM   Result Value Ref Range    POST-VOID RESIDUAL VOLUME, ML POC 0 mL   POCT urine dip    Collection Time: 09/18/18 11:32 AM   Result Value Ref Range    LEUKOCYTE ESTERASE,UA -     NITRITE,UA -     SL AMB POCT URINE PROTEIN -      PH,UA 6 0      BLOOD,UA moderate     SPECIFIC GRAVITY,UA 1 010     KETONES,UA -     GLUCOSE, UA -      COLOR,UA yellow      CLARITY,UA clear This note was created with voice recognition software  Phonic, grammatical and spelling errors may be present within the note as a result

## 2018-10-22 ENCOUNTER — TELEPHONE (OUTPATIENT)
Dept: UROLOGY | Facility: MEDICAL CENTER | Age: 23
End: 2018-10-22

## 2018-10-22 NOTE — TELEPHONE ENCOUNTER
Pt calling, wants appt w Dr Jessica Fairbanks for incontinence, scheduled 12/24 at [facility], is this appropriate?

## 2018-11-02 ENCOUNTER — TELEPHONE (OUTPATIENT)
Dept: HEMATOLOGY ONCOLOGY | Facility: CLINIC | Age: 23
End: 2018-11-02

## 2018-11-05 ENCOUNTER — OFFICE VISIT (OUTPATIENT)
Dept: HEMATOLOGY ONCOLOGY | Facility: CLINIC | Age: 23
End: 2018-11-05
Payer: COMMERCIAL

## 2018-11-05 ENCOUNTER — OFFICE VISIT (OUTPATIENT)
Dept: PLASTIC SURGERY | Facility: CLINIC | Age: 23
End: 2018-11-05
Payer: COMMERCIAL

## 2018-11-05 VITALS — WEIGHT: 192.8 LBS | BODY MASS INDEX: 24.74 KG/M2 | HEIGHT: 74 IN

## 2018-11-05 VITALS
HEART RATE: 73 BPM | RESPIRATION RATE: 16 BRPM | HEIGHT: 74 IN | TEMPERATURE: 97.6 F | WEIGHT: 193 LBS | OXYGEN SATURATION: 98 % | SYSTOLIC BLOOD PRESSURE: 140 MMHG | DIASTOLIC BLOOD PRESSURE: 82 MMHG | BODY MASS INDEX: 24.77 KG/M2

## 2018-11-05 DIAGNOSIS — N62 GYNECOMASTIA: Primary | ICD-10-CM

## 2018-11-05 DIAGNOSIS — C81.00: Primary | ICD-10-CM

## 2018-11-05 PROCEDURE — 99214 OFFICE O/P EST MOD 30 MIN: CPT | Performed by: PHYSICIAN ASSISTANT

## 2018-11-05 PROCEDURE — 99243 OFF/OP CNSLTJ NEW/EST LOW 30: CPT | Performed by: SURGERY

## 2018-11-05 NOTE — PROGRESS NOTES
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 however with no evidence of bone marrow involvement, no constitutional symptoms when he presented with thrombocytopenia and lymphadenopathy  He was treated with single agent rituximab with good response in February 2011 and received maintenance rituximab every 2 months finished 12 doses in March 2014  In September 2016 he had thrombocytopenia with recurrence of axillary lymphadenopathy, biopsy did not confirm lymphocyte-predominant Hodgkin lymphoma but 2nd opinion at [facility] recommended restarting rituximab  Currently, he is on every three-month maintenance therapy with 8 doses planned  Recommend f/u in 6 months with repeat labs and CT C/A/P        HPI:  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, no evidence of bone marrow involvement no constitutional symptoms when he presented with immune thrombocytopenic purpura and anemia in February 2011   He is status post excisional axillary lymph node biopsy   Gladis Brizuela 2nd opinion at [facility] and the recommendation to continue treatment with rituximab      He was treated with rituximab weekly x4 doses along with prednisone with excellent response of thrombocytopenia and anemia and later on he received rituximab 375 milligram/meter squared every 2 months finished dose 12 in March 2014      Follow-up in September 2016 showed left posterior cervical lymphadenopathy, left axillary lymphadenopathy and thrombocytopenia with platelet count 31075       PET scan denied by the insurance, CT scan showed left greater than the right axillary lymphadenopathy, excisional biopsy of the left axillary lymph node did not identify recurrence however a 2nd opinion at [facility] again recommended treatment with rituximab     9/2016:  Prominent but not pathologically enlarged lymph nodes within the level 2 chip station as well as within [facility] ring  Although somewhat atypical for discontinuous involvement of Hodgkin lymphoma, consider characterization with physiologic imaging  Left greater the right axillary lymphadenopathy noted  The largest left axillary lymph node measures 3 2 x 1 9 cm axially, previously 3 5 x 2 6 cm  CT N/C/A/P May 2, 2017 compared to 9/8/2016 scan: The axillary adenopathy seen on the previous examination has significantly improved  The lymph nodes which are visible on today's study are all smaller and appear normal in morphology as well  CT A/P 8/1/2018 performed secondary to abdominal pain was negative for any evidence of malignancy      Interval History:  Feels well  Denies fevers, chills, sweats  No known adenopathy  Is experiencing some urinary hesitancy  Is seeing a urologist - Dr Jaylin Bryant  Test Results:        Labs:   Lab Results   Component Value Date    HGB 15 0 07/20/2018    HCT 45 4 07/20/2018    MCV 90 07/20/2018     07/20/2018    WBC 5 88 07/20/2018    NRBC 0 07/20/2018     Lab Results   Component Value Date    K 3 5 07/20/2018     07/20/2018    CO2 24 07/20/2018    BUN 9 07/20/2018    CREATININE 1 15 07/20/2018    GLUF 74 07/20/2018    CALCIUM 9 3 07/20/2018    AST 13 07/20/2018    ALT 19 07/20/2018    ALKPHOS 48 07/20/2018    EGFR 102 07/20/2018       Imaging: No results found  ROS:  As mentioned in HPI & Interval History otherwise 14 point ROS negative  Allergies: Allergies   Allergen Reactions    Penicillins Hives     As an infant - "lungs collapsed" per mom     Current Medications: Reviewed  PMH/FH/SH:  Reviewed      Physical Exam:    There is no height or weight on file to calculate BSA      Ht Readings from Last 3 Encounters:   10/19/18 6' 2" (1 88 m)   18 6' 2" (1 88 m)   18 6' 0 83" (1 85 m)        Wt Readings from Last 3 Encounters:   10/19/18 86 2 kg (190 lb)   18 87 1 kg (192 lb)   18 85 6 kg (188 lb 11 4 oz)        Temp Readings from Last 3 Encounters:   18 97 7 °F (36 5 °C) (Oral)   18 97 8 °F (36 6 °C)   18 (!) 97 3 °F (36 3 °C) (Tympanic)        BP Readings from Last 3 Encounters:   10/19/18 118/72   18 140/80   18 132/74           Physical Exam   Constitutional: He is oriented to person, place, and time  He appears well-developed and well-nourished  No distress  HENT:   Head: Normocephalic and atraumatic  Mouth/Throat: Oropharynx is clear and moist  No oropharyngeal exudate  Eyes: Pupils are equal, round, and reactive to light  Conjunctivae and EOM are normal    Neck: Normal range of motion  Neck supple  No tracheal deviation present  No thyromegaly present  Cardiovascular: Normal rate and regular rhythm  Exam reveals no gallop and no friction rub  No murmur heard  Pulmonary/Chest: Effort normal and breath sounds normal  No respiratory distress  He has no wheezes  He has no rales  He exhibits no tenderness  Abdominal: Soft  Bowel sounds are normal  He exhibits no distension and no mass  There is no tenderness  There is no rebound and no guarding  Musculoskeletal: Normal range of motion  Lymphadenopathy:     He has no cervical adenopathy  Neurological: He is alert and oriented to person, place, and time  Skin: Skin is warm and dry  No rash noted  He is not diaphoretic  No erythema  No pallor  Psychiatric: He has a normal mood and affect  His behavior is normal  Judgment and thought content normal    Vitals reviewed  ECO      Goals and Barriers:  Current Goal:   Prolong Survival from Cancer/ Have good QOL  Barriers: None  Patient's Capacity to Self Care:  Patient is able to self care      Emergency Contacts:    Eloina Gutierres, [phone],

## 2018-11-05 NOTE — PROGRESS NOTES
Assessment/Plan:  Please see HPI  He is a nice young man with a long history of bilateral gynecomastia  He has noticed tenderness, occasional pain with contact, he would like to have this corrected surgically  At today's visit talked about surgical correction of the gynecomastia utilizing an infra-areolar approach, resection of the glandular tissue, plus/minus conservative liposuction to tailor/feathered the flaps if indicated at the time of the procedure  We talked about potential risks, complications and limitations including, but not limited to infection, bleeding, scarring, asymmetry, contour deformity, saucer deformity and , hematoma, seroma, etc   He understands and is interested in proceeding  The appropriate measurements and photographs were obtained and a letter of medical necessity will be dictated  There are no diagnoses linked to this encounter  Subjective:   Gynecomastia     Patient ID: Tiffanie Arceo  is a 20 y o  male  HPI he noticed breast development initially when he was [age]years of age, it has persisted since that time  It is occasionally tender and painful  He has not lost nor gained a significant amount of weight, he he is lean, and fit,  He has no known inciting factors including no steroid use, no use of marijuana, or othermedications known to contribute to development of gynecomastia      The following portions of the patient's history were reviewed and updated as appropriate: allergies, current medications, past family history, past medical history, past social history, past surgical history and problem list     Review of Systems      Objective:      Ht 6' 2" (1 88 m)   Wt 87 5 kg (192 lb 12 8 oz)   BMI 24 75 kg/m²          Physical Exam

## 2018-11-06 ENCOUNTER — HOSPITAL ENCOUNTER (OUTPATIENT)
Dept: INFUSION CENTER | Facility: CLINIC | Age: 23
Discharge: HOME | End: 2018-11-06
Payer: COMMERCIAL

## 2018-11-06 VITALS
WEIGHT: 191.36 LBS | HEIGHT: 73 IN | RESPIRATION RATE: 18 BRPM | BODY MASS INDEX: 25.36 KG/M2 | HEART RATE: 73 BPM | DIASTOLIC BLOOD PRESSURE: 86 MMHG | SYSTOLIC BLOOD PRESSURE: 134 MMHG | TEMPERATURE: 97.7 F

## 2018-11-06 PROCEDURE — 96367 TX/PROPH/DG ADDL SEQ IV INF: CPT

## 2018-11-06 PROCEDURE — 96375 TX/PRO/DX INJ NEW DRUG ADDON: CPT

## 2018-11-06 PROCEDURE — 96413 CHEMO IV INFUSION 1 HR: CPT

## 2018-11-06 PROCEDURE — 96415 CHEMO IV INFUSION ADDL HR: CPT

## 2018-11-06 RX ORDER — SODIUM CHLORIDE 9 MG/ML
20 INJECTION, SOLUTION INTRAVENOUS CONTINUOUS
Status: DISCONTINUED | OUTPATIENT
Start: 2018-11-06 | End: 2018-11-09 | Stop reason: HOSPADM

## 2018-11-06 RX ORDER — ACETAMINOPHEN 325 MG/1
650 TABLET ORAL ONCE
Status: COMPLETED | OUTPATIENT
Start: 2018-11-06 | End: 2018-11-06

## 2018-11-06 RX ADMIN — HYDROCORTISONE SODIUM SUCCINATE 100 MG: 100 INJECTION, POWDER, FOR SOLUTION INTRAMUSCULAR; INTRAVENOUS at 10:41

## 2018-11-06 RX ADMIN — RITUXIMAB 800 MG: 10 INJECTION, SOLUTION INTRAVENOUS at 11:31

## 2018-11-06 RX ADMIN — ACETAMINOPHEN 650 MG: 325 TABLET, FILM COATED ORAL at 10:40

## 2018-11-06 RX ADMIN — DIPHENHYDRAMINE HYDROCHLORIDE 25 MG: 50 INJECTION, SOLUTION INTRAMUSCULAR; INTRAVENOUS at 10:45

## 2018-11-06 RX ADMIN — SODIUM CHLORIDE 20 ML/HR: 0.9 INJECTION, SOLUTION INTRAVENOUS at 10:39

## 2018-11-06 NOTE — PROGRESS NOTES
Pt here for last Rituxan infusion, offers no complaints, resting comfortably  Vitals stable  Call bell in reach, will continue to monitor

## 2019-01-21 NOTE — PATIENT INSTRUCTIONS
Urinary Urgency and Frequency   WHAT YOU NEED TO KNOW:   What is urinary urgency and frequency? Urinary urgency and frequency is a condition that increases how strongly or how often you need to urinate  The condition may also be called urgency-frequency syndrome  Urinary urgency means you feel such a strong need to urinate that you have trouble waiting  You may also feel discomfort in your bladder  Urinary frequency means you need to urinate many times during the day  This may also be called increased daytime frequency  You may be woken from sleep by the need to urinate  Urgency and frequency often happen together, but you may only have one  What causes urinary urgency and frequency? · A urinary tract injury or infection (UTI), or a chronic bladder infection    · Infection in your urethra, or urine leaking from your urethra    · A nerve problem, or radiation treatment for cancer    · A medical condition, such as bladder cancer, diabetes, or a stroke    · Anxiety    · In women, pregnancy, menopause, or a vaginal infection    · In men, prostate infections, swelling, or enlargement  How are urination problems diagnosed? Your healthcare provider will ask questions about your symptoms  The provider will check your pelvic area and abdomen for problems that may be causing your symptoms  Tell the provider about any medical conditions you have and the medicines you take  You may need any of the following:  · Blood and urine tests  may be done to look for signs of infection, or blood in your urine  Your blood glucose (sugar) level may also be tested  · An ultrasound  may be used to measure the amount of urine in your bladder after you urinate  · A cystoscopy  may show problems inside your bladder  The cystoscope is a long tube with a lens and a light on the end  · Urodynamic testing  may show how well your bladder works  How is urinary urgency and frequency treated?   Treatment will depend on the type and cause of your urination problems  You may need any of the following:  · Medicines  may be given to relax your bladder and decrease urination  You may also need antibiotics if your symptoms are caused by a bacterial infection  · Sacral nerve stimulation  sends electrical signals to your sacral nerve through a small device implanted under your skin  Your sacral nerve controls your bladder, sphincter, and pelvic floor muscles  · Botox injections  into your bladder may help relax your bladder muscle to decrease urgency and frequency  · Surgery  may be done if all other treatments cannot help you control your bladder  What can I do to manage urinary urgency and frequency? · Keep a record of your urination patterns for a few days  Write down the number of times you urinate over 24 hours, the amount, and if you have urine leakage  Record how strong the urge to urinate was each time  Your healthcare provider may also want you to record the type and amount of liquids you drink  · Train your bladder  Go to the bathroom at set times, such as every 2 hours, even if you do not feel the urge to go  You can also try to hold your urine when you feel the urge to go  For example, hold your urine for 5 minutes when you feel the urge to go  As that becomes easier, hold your urine for 10 minutes  Work up to every 3 or 4 hours to help control your bladder  · Limit liquids as directed  Limit liquids to decrease the amount you urinate  Ask how much liquid to drink each day and which liquids are best for you  You may need to avoid drinking liquids several hours before you go to sleep  Your healthcare provider may also recommend that you limit caffeine and alcohol  · Do Kegel exercises often  Kegel exercises help strengthen your pelvic muscles and improve bladder control  These muscles help you stop urinating  Squeeze these muscles tightly for 5 seconds like you are trying to stop the flow of urine   Then relax for 5 seconds  Gradually work up to squeezing for 10 seconds  Do 3 sets of 15 repetitions a day, or as directed  · Exercise regularly and maintain a healthy weight  Ask your healthcare provider how much you should weigh and about the best exercise plan for you  Extra weight puts pressure on your bladder and may make your symptoms worse  Ask your provider to help you create a safe weight loss plan if you are overweight  When should I contact my healthcare provider? · Your urine is pink, or you notice blood in your urine  · You have pain with urination  · You continue to have symptoms even after you take your medicine  · You have new or worsening symptoms  · You have questions or concerns about your condition or care  CARE AGREEMENT:   You have the right to help plan your care  Learn about your health condition and how it may be treated  Discuss treatment options with your caregivers to decide what care you want to receive  You always have the right to refuse treatment  The above information is an  only  It is not intended as medical advice for individual conditions or treatments  Talk to your doctor, nurse or pharmacist before following any medical regimen to see if it is safe and effective for you  © 2017 [employer]  Information is for End User's use only and may not be sold, redistributed or otherwise used for commercial purposes  All illustrations and images included in CareNotes® are the copyrighted property of [employer]  or [employer]

## 2019-01-21 NOTE — PROGRESS NOTES
Problem List Items Addressed This Visit     Urinary urgency     Patient with previous complaints of urinary urgency and frequency after a pelvic straddle injury, also with some pelvic discomfort and pressure  Main complaint today is lack of sensation of needing to urinate, cystoscopy today is negative for anatomic abnormalities  Plan:  Discussed with patient maintenance of normal bowel function, timed voiding for treatment of his complaints  Previous lab work shows no concern for diabetes or diabetic cystoscopy  He will follow up with us on a p r n  Basis should his symptoms worsen in the future         Constipation - Primary     Patient with previous history of constipation, this is not a main complaint for him at this time         Urethral straddle injury     Patient with a history of a urethral straddle injury in the past and complaints of pelvic pain and discomfort with concern for urethral stricture  Discussed with him office cystoscopy and this was performed today without issue or complication and was normal showing no evidence of urethral stricture  Other Visit Diagnoses     Cannot urinate        inability to feel need to urinate                 Assessment and plan:     Please see problem oriented charting for the assessment plan of today's urological complaints    Gilda Singer MD      Chief Complaint     Chief Complaint   Patient presents with    Urinary Incontinence    History of urethral straddle injury  History of constipation      History of Present Illness     Agnieszka Melendez  is a 20 y o  gentleman previously seen by our service for urinary urgency and frequency  His main complaint today is decreased sense of urgency or need to urinate and he has to remind himself to do this  He does have a history of a urethral straddle injury without cystoscopy previously  Cystoscopy today is negative for urethral stricture or other abnormalities      He complains of no dysuria, incontinence, hesitancy, hematuria, urgency, he does have nocturia twice per night and stream quality is good  The following portions of the patient's history were reviewed and updated as appropriate: allergies, current medications, past family history, past medical history, past social history, past surgical history and problem list     Detailed Urologic History     - please refer to HPI    Review of Systems     Review of Systems   Constitutional: Negative  HENT: Negative  Eyes: Negative  Respiratory: Negative  Cardiovascular: Negative  Gastrointestinal: Negative  Endocrine: Negative  Genitourinary:        As per HPI   Musculoskeletal: Negative  Skin: Negative  Allergic/Immunologic: Negative  Neurological: Negative  Hematological: Negative  Psychiatric/Behavioral: Negative  Allergies     Allergies   Allergen Reactions    Penicillins Hives     As an infant - "lungs collapsed" per mom       Physical Exam     Physical Exam   Constitutional: He is oriented to person, place, and time  He appears well-developed and well-nourished  No distress   male appearing stated age in no apparent distress   HENT:   Head: Normocephalic and atraumatic  Right Ear: External ear normal    Left Ear: External ear normal    Nose: Nose normal    Eyes: Pupils are equal, round, and reactive to light  EOM are normal  Right eye exhibits no discharge  Left eye exhibits no discharge  Neck: Normal range of motion  Neck supple  Cardiovascular: Regular rhythm and intact distal pulses  Pulmonary/Chest: Effort normal  No stridor  No respiratory distress  He has no wheezes  Abdominal: Soft  He exhibits no distension and no mass  There is no tenderness  There is no rebound and no guarding  No hernia  Genitourinary:   Genitourinary Comments: Meatus is normal, penis is normal   Musculoskeletal: He exhibits no edema, tenderness or deformity     Neurological: He is alert and oriented to person, place, and time  No cranial nerve deficit  Coordination normal    Skin: Skin is warm and dry  No rash noted  He is not diaphoretic  No erythema  No pallor  Psychiatric: He has a normal mood and affect  His behavior is normal  Judgment and thought content normal    Nursing note and vitals reviewed  Vital Signs  Vitals:    01/22/19 0814   BP: 128/94   Pulse: (!) 123   Weight: 89 2 kg (196 lb 9 6 oz)   Height: 6' 1" (1 854 m)         Current Medications     No current outpatient prescriptions on file        Active Problems     Patient Active Problem List   Diagnosis    Idiopathic thrombocytopenic purpura ([place] [address] )    Stage IIA lymphocyte-predominant Hodgkin's disease ([place] [address] )    Allergic rhinitis    Breast lump    Urinary urgency    Dysphagia    Constipation    Rectal bleeding    Blood in stool    Gynecomastia    Urethral straddle injury         Past Medical History     Past Medical History:   Diagnosis Date    Cancer ([place] [address] )     hodgkin's lymphoma    ITP (idiopathic thrombocytopenic purpura)     Lymphoma ([place] [address] )          Surgical History     Past Surgical History:   Procedure Laterality Date    HERNIA REPAIR      umbilical - at 7 months of age   [de-identified] MO BX/REMV,LYMPH NODE,DEEP AXILL Left 9/26/2016    Procedure: AXILLARY LYMPH NODE BIOPSY;  Surgeon: Dempsey Fabry, MD;  Location: [facility];  Service: Surgical Oncology    SENTINEL LYMPH NODE BIOPSY Right          Family History     Family History   Problem Relation Age of Onset    Breast cancer Maternal Aunt     Breast cancer Maternal Grandmother     Hodgkin's lymphoma Paternal Grandfather          Social History     Social History     History   Smoking Status    Never Smoker   Smokeless Tobacco    Never Used         Pertinent Lab Values     Lab Results   Component Value Date    CREATININE 1 15 07/20/2018       No results found for: PSA          Pertinent Imaging      There is no pertinent urological imaging for my review

## 2019-01-22 ENCOUNTER — OFFICE VISIT (OUTPATIENT)
Dept: UROLOGY | Facility: CLINIC | Age: 24
End: 2019-01-22
Payer: COMMERCIAL

## 2019-01-22 VITALS
BODY MASS INDEX: 26.06 KG/M2 | SYSTOLIC BLOOD PRESSURE: 128 MMHG | HEIGHT: 73 IN | HEART RATE: 123 BPM | DIASTOLIC BLOOD PRESSURE: 94 MMHG | WEIGHT: 196.6 LBS

## 2019-01-22 DIAGNOSIS — R33.9: ICD-10-CM

## 2019-01-22 DIAGNOSIS — R39.15 URINARY URGENCY: ICD-10-CM

## 2019-01-22 DIAGNOSIS — S37.39XD URETHRAL STRADDLE INJURY, SUBSEQUENT ENCOUNTER: ICD-10-CM

## 2019-01-22 DIAGNOSIS — K59.00 CONSTIPATION, UNSPECIFIED CONSTIPATION TYPE: Primary | ICD-10-CM

## 2019-01-22 PROBLEM — S37.39XA: Status: ACTIVE | Noted: 2019-01-22

## 2019-01-22 PROBLEM — N39.41 URGENCY INCONTINENCE: Status: RESOLVED | Noted: 2018-07-30 | Resolved: 2019-01-22

## 2019-01-22 PROCEDURE — 99214 OFFICE O/P EST MOD 30 MIN: CPT | Performed by: UROLOGY

## 2019-01-22 PROCEDURE — 52000 CYSTOURETHROSCOPY: CPT | Performed by: UROLOGY

## 2019-01-22 NOTE — PROGRESS NOTES
Office Cystoscopy Procedure Note    Indication:    History of urethral straddle injury    Informed consent   The risks, benefits, complications, treatment options, and expected outcomes were discussed with the patient  The patient concurred with the proposed plan and provided informed consent  Anesthesia  Lidocaine jelly 2%    Antibiotic prophylaxis   None    Procedure  The patient was placed in the supineposition, was prepped and draped in the usual manner using sterile technique, and 2% lidocaine jelly instilled into the urethra  A 17 F flexible cystoscope was then inserted into the urethra and the urethra and bladder carefully examined  The following findings were noted:    Findings:  Urethra:  Normal, no stricture, intact sphincter  Prostate:  Normal prostate, no obstruction  Bladder:  Normal, no lesions, no stones, no carcinoma in situ  Ureteral orifices:  Orthotopic, clear urine exiting  Other findings:  None, above findings confirmed on retroflexed view    Specimens: None                 Complications:    None; patient tolerated the procedure well           Disposition: To home            Condition: Stable    Plan: The patient cystoscopy is negative for urethral stricture      Cystoscopy  Date/Time: 1/22/2019 9:01 AM  Performed by: Robert Patten  Authorized by: Robert Patten     Procedure details: cystoscopy    Patient tolerance: Patient tolerated the procedure well with no immediate complications    Additional Procedure Details: Cystoscopy is negative for urethral stricture or other abnormality, the patient was happy to hear this news

## 2019-01-22 NOTE — LETTER
January 22, 2019     Desmond Long PA-C  [address]  [place]    Patient: Crow Frost  YOB: 1995   Date of Visit: 1/22/2019       Dear Dr Karla Rosales:    Thank you for referring Anthony Harden to me for evaluation  Below are my notes for this consultation  If you have questions, please do not hesitate to call me  I look forward to following your patient along with you  Sincerely,        Primitivo Dyer MD        CC: MD Kyle Villafuerte PA-C Rene Salen, MD  1/22/2019  9:01 AM  Sign at close encounter  Office Cystoscopy Procedure Note    Indication:    History of urethral straddle injury    Informed consent   The risks, benefits, complications, treatment options, and expected outcomes were discussed with the patient  The patient concurred with the proposed plan and provided informed consent  Anesthesia  Lidocaine jelly 2%    Antibiotic prophylaxis   None    Procedure  The patient was placed in the supineposition, was prepped and draped in the usual manner using sterile technique, and 2% lidocaine jelly instilled into the urethra  A 17 F flexible cystoscope was then inserted into the urethra and the urethra and bladder carefully examined  The following findings were noted:    Findings:  Urethra:  Normal, no stricture, intact sphincter  Prostate:  Normal prostate, no obstruction  Bladder:  Normal, no lesions, no stones, no carcinoma in situ  Ureteral orifices:  Orthotopic, clear urine exiting  Other findings:  None, above findings confirmed on retroflexed view    Specimens: None                 Complications:    None; patient tolerated the procedure well           Disposition: To home            Condition: Stable    Plan: The patient cystoscopy is negative for urethral stricture      Cystoscopy  Date/Time: 1/22/2019 9:01 AM  Performed by: Sony Rockwell  Authorized by: Sony Rockwell     Procedure details: cystoscopy    Patient tolerance: Patient tolerated the procedure well with no immediate complications    Additional Procedure Details: Cystoscopy is negative for urethral stricture or other abnormality, the patient was happy to hear this news                Vernon Goltz, MD  1/22/2019  9:00 AM  Sign at close encounter       Problem List Items Addressed This Visit     Urinary urgency     Patient with previous complaints of urinary urgency and frequency after a pelvic straddle injury, also with some pelvic discomfort and pressure  Main complaint today is lack of sensation of needing to urinate, cystoscopy today is negative for anatomic abnormalities  Plan:  Discussed with patient maintenance of normal bowel function, timed voiding for treatment of his complaints  Previous lab work shows no concern for diabetes or diabetic cystoscopy  He will follow up with us on a p r n  Basis should his symptoms worsen in the future         Constipation - Primary     Patient with previous history of constipation, this is not a main complaint for him at this time         Urethral straddle injury     Patient with a history of a urethral straddle injury in the past and complaints of pelvic pain and discomfort with concern for urethral stricture  Discussed with him office cystoscopy and this was performed today without issue or complication and was normal showing no evidence of urethral stricture  Other Visit Diagnoses     Cannot urinate        inability to feel need to urinate                 Assessment and plan:     Please see problem oriented charting for the assessment plan of today's urological complaints    Vernon Goltz, MD      Chief Complaint     Chief Complaint   Patient presents with    Urinary Incontinence    History of urethral straddle injury  History of constipation      History of Present Illness     Rama Schmitt  is a 20 y o  gentleman previously seen by our service for urinary urgency and frequency    His main complaint today is decreased sense of urgency or need to urinate and he has to remind himself to do this  He does have a history of a urethral straddle injury without cystoscopy previously  Cystoscopy today is negative for urethral stricture or other abnormalities  He complains of no dysuria, incontinence, hesitancy, hematuria, urgency, he does have nocturia twice per night and stream quality is good  The following portions of the patient's history were reviewed and updated as appropriate: allergies, current medications, past family history, past medical history, past social history, past surgical history and problem list     Detailed Urologic History     - please refer to HPI    Review of Systems     Review of Systems   Constitutional: Negative  HENT: Negative  Eyes: Negative  Respiratory: Negative  Cardiovascular: Negative  Gastrointestinal: Negative  Endocrine: Negative  Genitourinary:        As per HPI   Musculoskeletal: Negative  Skin: Negative  Allergic/Immunologic: Negative  Neurological: Negative  Hematological: Negative  Psychiatric/Behavioral: Negative  Allergies     Allergies   Allergen Reactions    Penicillins Hives     As an infant - "lungs collapsed" per mom       Physical Exam     Physical Exam   Constitutional: He is oriented to person, place, and time  He appears well-developed and well-nourished  No distress   male appearing stated age in no apparent distress   HENT:   Head: Normocephalic and atraumatic  Right Ear: External ear normal    Left Ear: External ear normal    Nose: Nose normal    Eyes: Pupils are equal, round, and reactive to light  EOM are normal  Right eye exhibits no discharge  Left eye exhibits no discharge  Neck: Normal range of motion  Neck supple  Cardiovascular: Regular rhythm and intact distal pulses  Pulmonary/Chest: Effort normal  No stridor  No respiratory distress  He has no wheezes  Abdominal: Soft  He exhibits no distension and no mass  There is no tenderness  There is no rebound and no guarding  No hernia  Genitourinary:   Genitourinary Comments: Meatus is normal, penis is normal   Musculoskeletal: He exhibits no edema, tenderness or deformity  Neurological: He is alert and oriented to person, place, and time  No cranial nerve deficit  Coordination normal    Skin: Skin is warm and dry  No rash noted  He is not diaphoretic  No erythema  No pallor  Psychiatric: He has a normal mood and affect  His behavior is normal  Judgment and thought content normal    Nursing note and vitals reviewed  Vital Signs  Vitals:    01/22/19 0814   BP: 128/94   Pulse: (!) 123   Weight: 89 2 kg (196 lb 9 6 oz)   Height: 6' 1" (1 854 m)         Current Medications     No current outpatient prescriptions on file        Active Problems     Patient Active Problem List   Diagnosis    Idiopathic thrombocytopenic purpura ([facility] )    Stage IIA lymphocyte-predominant Hodgkin's disease ([facility] )    Allergic rhinitis    Breast lump    Urinary urgency    Dysphagia    Constipation    Rectal bleeding    Blood in stool    Gynecomastia    Urethral straddle injury         Past Medical History     Past Medical History:   Diagnosis Date    Cancer ([facility] )     hodgkin's lymphoma    ITP (idiopathic thrombocytopenic purpura)     [facility] )          Surgical History     Past Surgical History:   Procedure Laterality Date    HERNIA REPAIR      umbilical - at 7 months of age   Kaden Baker ID BX/REMV,LYMPH NODE,DEEP AXILL Left 9/26/2016    Procedure: AXILLARY LYMPH NODE BIOPSY;  Surgeon: Boaz Edwards MD;  [facility];  Service: Surgical Oncology    SENTINEL LYMPH NODE BIOPSY Right          Family History     Family History   Problem Relation Age of Onset    Breast cancer Maternal Aunt     Breast cancer Maternal Grandmother     Hodgkin's lymphoma Paternal Grandfather          Social History     Social History History   Smoking Status    Never Smoker   Smokeless Tobacco    Never Used         Pertinent Lab Values     Lab Results   Component Value Date    CREATININE 1 15 07/20/2018       No results found for: PSA          Pertinent Imaging      There is no pertinent urological imaging for my review

## 2019-01-22 NOTE — ASSESSMENT & PLAN NOTE
Patient with previous complaints of urinary urgency and frequency after a pelvic straddle injury, also with some pelvic discomfort and pressure  Main complaint today is lack of sensation of needing to urinate, cystoscopy today is negative for anatomic abnormalities  Plan:  Discussed with patient maintenance of normal bowel function, timed voiding for treatment of his complaints  Previous lab work shows no concern for diabetes or diabetic cystoscopy  He will follow up with us on a p r n   Basis should his symptoms worsen in the future

## 2019-01-22 NOTE — ASSESSMENT & PLAN NOTE
Patient with a history of a urethral straddle injury in the past and complaints of pelvic pain and discomfort with concern for urethral stricture  Discussed with him office cystoscopy and this was performed today without issue or complication and was normal showing no evidence of urethral stricture

## 2019-05-20 ENCOUNTER — TELEPHONE (OUTPATIENT)
Dept: GYNECOLOGIC ONCOLOGY | Facility: CLINIC | Age: 24
End: 2019-05-20

## 2022-10-03 ENCOUNTER — HOSPITAL ENCOUNTER (EMERGENCY)
Facility: HOSPITAL | Age: 27
Discharge: HOME | End: 2022-10-03
Attending: EMERGENCY MEDICINE

## 2022-10-03 ENCOUNTER — APPOINTMENT (OUTPATIENT)
Dept: RADIOLOGY | Facility: HOSPITAL | Age: 27
End: 2022-10-03

## 2022-10-03 ENCOUNTER — APPOINTMENT (EMERGENCY)
Dept: VASCULAR ULTRASOUND | Facility: HOSPITAL | Age: 27
End: 2022-10-03

## 2022-10-03 VITALS
SYSTOLIC BLOOD PRESSURE: 177 MMHG | RESPIRATION RATE: 18 BRPM | TEMPERATURE: 98 F | OXYGEN SATURATION: 99 % | DIASTOLIC BLOOD PRESSURE: 81 MMHG | HEART RATE: 56 BPM

## 2022-10-03 DIAGNOSIS — M25.562 LEFT KNEE PAIN: Primary | ICD-10-CM

## 2022-10-03 PROCEDURE — 93971 EXTREMITY STUDY: CPT | Performed by: SURGERY

## 2022-10-03 PROCEDURE — 73564 X-RAY EXAM KNEE 4 OR MORE: CPT

## 2022-10-03 PROCEDURE — 99284 EMERGENCY DEPT VISIT MOD MDM: CPT | Performed by: EMERGENCY MEDICINE

## 2022-10-03 PROCEDURE — 99284 EMERGENCY DEPT VISIT MOD MDM: CPT

## 2022-10-03 PROCEDURE — 93971 EXTREMITY STUDY: CPT

## 2022-10-03 RX ORDER — HYDROCODONE BITARTRATE AND ACETAMINOPHEN 5; 325 MG/1; MG/1
1 TABLET ORAL ONCE
Status: COMPLETED | OUTPATIENT
Start: 2022-10-03 | End: 2022-10-03

## 2022-10-03 RX ORDER — HYDROCODONE BITARTRATE AND ACETAMINOPHEN 5; 325 MG/1; MG/1
1 TABLET ORAL EVERY 6 HOURS PRN
Qty: 12 TABLET | Refills: 0 | Status: SHIPPED | OUTPATIENT
Start: 2022-10-03

## 2022-10-03 RX ADMIN — HYDROCODONE BITARTRATE AND ACETAMINOPHEN 1 TABLET: 5; 325 TABLET ORAL at 20:58

## 2022-10-03 NOTE — Clinical Note
Ovidio Fox was seen and treated in our emergency department on 10/3/2022  Diagnosis: knee pain    Leanna Crew  may return to work on return date  He may return on this date: 10/10/2022         If you have any questions or concerns, please don't hesitate to call        Kel Delgadillo MD    ______________________________           _______________          _______________  Hospital Representative                              Date                                Time

## 2022-10-03 NOTE — ED PROVIDER NOTES
History  Chief Complaint   Patient presents with    Knee Pain     Patient c/o left knee swelling and left calf swelling that started a week ago  No fall or injury  Patient had knee drained on Monday  HPI  31 yo M presents with left knee pain/swelling for the past week  Was seen at [address] [place] [address] ED with negative workup, has not followed up with orthopedics as he does not currently have insurance  He works for [employer] and this has worsened his pain  Today he started with L calf swelling  No fevers or chills  Symptoms are aching and constant, moderate, worse with ambulation  No trauma  History of lymphoma in remission  None       Past Medical History:   Diagnosis Date    Cancer ([address] )     hodgkin's lymphoma    ITP (idiopathic thrombocytopenic purpura)     Lymphoma (HCC)        Past Surgical History:   Procedure Laterality Date    HERNIA REPAIR      umbilical - at 7 months of age   [place] BX/REMV,LYMPH NODE,DEEP AXILL Left 9/26/2016    Procedure: AXILLARY LYMPH NODE BIOPSY;  Surgeon: Marti Smith MD;  [facility];  Service: Surgical Oncology    SENTINEL LYMPH NODE BIOPSY Right        Family History   Problem Relation Age of Onset    Breast cancer Maternal Aunt     Breast cancer Maternal Grandmother     Hodgkin's lymphoma Paternal Grandfather      I have reviewed and agree with the history as documented  E-Cigarette/Vaping    E-Cigarette Use Never User      E-Cigarette/Vaping Substances     Social History     Tobacco Use    Smoking status: Never Smoker    Smokeless tobacco: Never Used   Vaping Use    Vaping Use: Never used   Substance Use Topics    Alcohol use: No    Drug use: No       Review of Systems   Constitutional: Negative for chills and fever  HENT: Negative for dental problem and ear pain  Eyes: Negative for pain and redness  Respiratory: Negative for cough and shortness of breath  Cardiovascular: Positive for leg swelling  Negative for chest pain and palpitations     Gastrointestinal: Negative for abdominal pain and nausea  Endocrine: Negative for polydipsia and polyphagia  Genitourinary: Negative for dysuria and frequency  Musculoskeletal: Positive for arthralgias  Negative for joint swelling  Skin: Negative for color change and rash  Neurological: Negative for dizziness and headaches  Psychiatric/Behavioral: Negative for behavioral problems and confusion  All other systems reviewed and are negative  Physical Exam  Physical Exam  Vitals and nursing note reviewed  Constitutional:       General: He is not in acute distress  HENT:      Head: Normocephalic and atraumatic  Right Ear: External ear normal       Left Ear: External ear normal       Nose: Nose normal    Eyes:      General: No scleral icterus  Cardiovascular:      Rate and Rhythm: Normal rate  Pulmonary:      Effort: Pulmonary effort is normal  No respiratory distress  Abdominal:      General: There is no distension  Musculoskeletal:         General: No deformity  Normal range of motion  Comments: L knee mild effusion, TTP, normal ROM no erythema or warmth  Posterior LLE with edema and tenderness, n/v intact distally   Skin:     Findings: No rash  Neurological:      General: No focal deficit present  Mental Status: He is alert        Gait: Gait normal    Psychiatric:         Mood and Affect: Mood normal          Vital Signs  ED Triage Vitals [10/03/22 1802]   Temperature Pulse Respirations Blood Pressure SpO2   98 °F (36 7 °C) 56 18 (!) 177/81 99 %      Temp Source Heart Rate Source Patient Position - Orthostatic VS BP Location FiO2 (%)   Tympanic Monitor Sitting Left arm --      Pain Score       --           Vitals:    10/03/22 1802   BP: (!) 177/81   Pulse: 56   Patient Position - Orthostatic VS: Sitting         Visual Acuity      ED Medications  Medications   HYDROcodone-acetaminophen (NORCO) 5-325 mg per tablet 1 tablet (has no administration in time range)       Diagnostic Studies  Results Reviewed     None                 XR knee 4+ views LEFT    (Results Pending)   VAS lower limb venous duplex study, unilateral/limited    (Results Pending)              Procedures  Procedures         ED Course                                             MDM  33 yo M presents with L knee pain  Normal ROM, no erythema or fevers doubt SA  XR no acute fracture or dislocation per my read  Venous duplex negative for DVT  Discussed symptomatic control, orthopedics follow up  Disposition  Final diagnoses:   Left knee pain     Time reflects when diagnosis was documented in both MDM as applicable and the Disposition within this note     Time User Action Codes Description Comment    10/3/2022  8:47 PM Chandler Score Add [J71 314] Left knee pain       ED Disposition     ED Disposition   Discharge    Condition   Stable    Date/Time   Mon Oct 3, 2022  8:47 PM    Comment   Jeter Lax  discharge to home/self care  Follow-up Information     Follow up With Specialties Details Why Contact Info Additional [address] Call  for orthopedic follow up [address] [facility] [phone]  [facility], [address] [address] [place], [place], [facility]          Patient's Medications   Discharge Prescriptions    HYDROCODONE-ACETAMINOPHEN (NORCO) 5-325 MG PER TABLET    Take 1 tablet by mouth every 6 (six) hours as needed for pain Max Daily Amount: 4 tablets       Start Date: 10/3/2022 End Date: --       Order Dose: 1 tablet       Quantity: 12 tablet    Refills: 0       No discharge procedures on file      PDMP Review     None          ED Provider  Electronically Signed by           Leopoldo Weinstein MD  10/03/22 6338

## 2022-10-04 NOTE — DISCHARGE INSTRUCTIONS
Take norco for pain do not drive while taking this, keep elevated, follow up with orthopedics for recheck

## 2022-10-06 ENCOUNTER — OFFICE VISIT (OUTPATIENT)
Dept: OBGYN CLINIC | Facility: CLINIC | Age: 27
End: 2022-10-06

## 2022-10-06 VITALS
WEIGHT: 196 LBS | HEART RATE: 105 BPM | HEIGHT: 73 IN | SYSTOLIC BLOOD PRESSURE: 139 MMHG | BODY MASS INDEX: 25.98 KG/M2 | DIASTOLIC BLOOD PRESSURE: 84 MMHG

## 2022-10-06 DIAGNOSIS — M25.562 ACUTE PAIN OF LEFT KNEE: Primary | ICD-10-CM

## 2022-10-06 DIAGNOSIS — M25.462 EFFUSION OF LEFT KNEE: ICD-10-CM

## 2022-10-06 DIAGNOSIS — M23.92 INTERNAL DERANGEMENT OF LEFT KNEE: ICD-10-CM

## 2022-10-06 DIAGNOSIS — Z85.79 HISTORY OF LYMPHOMA: ICD-10-CM

## 2022-10-06 LAB
APPEARANCE FLD: ABNORMAL
COLOR FLD: YELLOW
CRYSTALS SNV QL MICRO: NORMAL
LYMPHOCYTES # SNV MANUAL: 1 %
MONOCYTES NFR SNV MANUAL: 9 %
NEUTROPHILS NFR SNV MANUAL: 90 %
SITE: ABNORMAL
TOTAL CELLS COUNTED SPEC: 100
WBC # FLD MANUAL: ABNORMAL /UL (ref 0–200)

## 2022-10-06 PROCEDURE — 89060 EXAM SYNOVIAL FLUID CRYSTALS: CPT | Performed by: PHYSICIAN ASSISTANT

## 2022-10-06 PROCEDURE — 89051 BODY FLUID CELL COUNT: CPT | Performed by: PHYSICIAN ASSISTANT

## 2022-10-06 PROCEDURE — 20610 DRAIN/INJ JOINT/BURSA W/O US: CPT | Performed by: ORTHOPAEDIC SURGERY

## 2022-10-06 PROCEDURE — 88185 FLOWCYTOMETRY/TC ADD-ON: CPT | Performed by: PHYSICIAN ASSISTANT

## 2022-10-06 PROCEDURE — 88184 FLOWCYTOMETRY/ TC 1 MARKER: CPT | Performed by: PHYSICIAN ASSISTANT

## 2022-10-06 PROCEDURE — 87476 LYME DIS DNA AMP PROBE: CPT | Performed by: PHYSICIAN ASSISTANT

## 2022-10-06 PROCEDURE — 87205 SMEAR GRAM STAIN: CPT | Performed by: PHYSICIAN ASSISTANT

## 2022-10-06 PROCEDURE — 87075 CULTR BACTERIA EXCEPT BLOOD: CPT | Performed by: PHYSICIAN ASSISTANT

## 2022-10-06 PROCEDURE — 99204 OFFICE O/P NEW MOD 45 MIN: CPT | Performed by: ORTHOPAEDIC SURGERY

## 2022-10-06 PROCEDURE — 87070 CULTURE OTHR SPECIMN AEROBIC: CPT | Performed by: PHYSICIAN ASSISTANT

## 2022-10-06 RX ORDER — IBUPROFEN 200 MG
TABLET ORAL EVERY 6 HOURS PRN
COMMUNITY

## 2022-10-06 NOTE — PROGRESS NOTES
Patient Name:  Schuyler Cottrell  MRN:  [id number]    Assessment & Plan     1  Acute pain of left knee  -     XR knee 4+ vw right injury; Future; Expected date: 10/06/2022  -     XR knee 1 or 2 vw left; Future; Expected date: 10/06/2022    2  Effusion of left knee  -     MRI knee left w contrast; Future; Expected date: 10/06/2022  -     Anaerobic culture and Gram stain; Future  -     Body fluid culture and Gram stain; Future  -     Lyme disease, PCR; Future  -     Synovial fluid white cell count w/ diff; Future  -     Synovial fluid, crystal; Future  -     Leukemia/Lymphoma flow cytometry; Future  -     Leukemia/Lymphoma flow cytometry  -     Synovial fluid, crystal  -     Synovial fluid white cell count w/ diff  -     Anaerobic culture and Gram stain  -     Body fluid culture and Gram stain  -     Lyme disease, PCR  -     Large joint arthrocentesis    3  Internal derangement of left knee  -     MRI knee left w contrast; Future; Expected date: 10/06/2022  -     Anaerobic culture and Gram stain; Future  -     Body fluid culture and Gram stain; Future  -     Lyme disease, PCR; Future  -     Synovial fluid white cell count w/ diff; Future  -     Synovial fluid, crystal; Future  -     Leukemia/Lymphoma flow cytometry; Future  -     Leukemia/Lymphoma flow cytometry  -     Synovial fluid, crystal  -     Synovial fluid white cell count w/ diff  -     Anaerobic culture and Gram stain  -     Body fluid culture and Gram stain  -     Lyme disease, PCR  -     Large joint arthrocentesis    4  History of lymphoma  -     MRI knee left w contrast; Future; Expected date: 10/06/2022  -     Anaerobic culture and Gram stain; Future  -     Body fluid culture and Gram stain; Future  -     Lyme disease, PCR; Future  -     Synovial fluid white cell count w/ diff; Future  -     Synovial fluid, crystal; Future  -     Leukemia/Lymphoma flow cytometry;  Future  -     Leukemia/Lymphoma flow cytometry  -     Synovial fluid, crystal  - Synovial fluid white cell count w/ diff  -     Anaerobic culture and Gram stain  -     Body fluid culture and Gram stain  -     Lyme disease, PCR  -     Large joint arthrocentesis        32 y o  male with Left knee effusion, calf pain with history of lymphoma  X-rays reviewed in office today with patient  At this time, strongly recommended Left knee aspiration and sending the fluid for testing  In light of patients moderate pain, decreased range of motion, and large effusion, with recent ultrasound evidence of structure in popliteal fosse and history of lymphoma, will move forward with MRI of Left knee and to include proximal calf  In the meantime, advised patient to continue with gentle ROM exercises, ice application, and elevation to decrease swelling  Patient was provided a work note to stay out of work until follow up appointment in office after MRI resulted  Chief Complaint     Left knee pain    History of the Present Illness     Ray Mclean  is a 32 y o  male with Left knee pain ongoing for about 2 weeks without known injury or trauma  Patient states he was at work at [employer] and noted a little pain in his Left knee  Patient reported to [facility] ED on 09/24/2022 for evaluation Patient reports the following Saturday he noted worsening Left knee pain with continued throughout his week  Patient states on Monday, he noted worsening pain and associated swelling of the knee and calf  He reported to the ED on 10/03/2022 where x-rays and ultrasound of the calf was performed  Patient does work out religiously and father is concerned it may be from exercising  Patient admits to history of lymphoma while he was a freshman in college  He was treated with oral medications and was in remission since 2018  He does follow up yearly with oncologist     Review of Systems     Review of Systems   Constitutional: Negative for chills and fever  HENT: Negative for ear pain and sore throat      Eyes: Negative for pain and visual disturbance  Respiratory: Negative for cough and shortness of breath  Cardiovascular: Negative for chest pain and palpitations  Gastrointestinal: Negative for abdominal pain and vomiting  Genitourinary: Negative for dysuria and hematuria  Musculoskeletal: Negative for arthralgias and back pain  Skin: Negative for color change and rash  Neurological: Negative for seizures and syncope  All other systems reviewed and are negative  Physical Exam     /84   Pulse 105   Ht 6' 1" (1 854 m)   Wt 88 9 kg (196 lb)   BMI 25 86 kg/m²     Left  Knee  Range of motion from 2 to 95 degrees  There is no crepitus with range of motion  There is moderate effusion with mild warmth  There is tenderness over the calf, but soft and compressible  There is 4/5 quadriceps strength and preserved tone  The patient is able to perform a straight leg raise  negative patellar grind test   Anterior drawer tests is negative  negative Lachman Test    Posterior drawer test is negative   Varus stress testing reveals no instability at 0 and 30 degrees   Valgus stress testing reveals no instability at 0 and 30 degrees  The patient is neurovascular intact distally  Eyes:  Anicteric sclerae  Neck:  Supple  Lungs:  Normal respiratory effort  Cardiovascular:  Capillary refill is less than 2 seconds  Skin:  Intact without erythema  Neurologic:  Sensation grossly intact to light touch  Psychiatric:  Mood and affect are appropriate  Data Review     I have personally reviewed pertinent films in PACS, and my interpretation follows:    X-rays taken 10/03/2022 of Left knee demonstrates well maintained joint spaces, no evidence of fracture or dislocation       Past Medical History:   Diagnosis Date    Cancer ([facility] [address] )     hodgkin's lymphoma    ITP (idiopathic thrombocytopenic purpura)     Lymphoma (HCC)        Past Surgical History:   Procedure Laterality Date    HERNIA REPAIR      umbilical - at 8months of age   Cristi [place] BX/REMV,LYMPH NODE,DEEP AXILL Left 9/26/2016    Procedure: AXILLARY LYMPH NODE BIOPSY;  Surgeon: Zachary Rosado MD;  [facility];  Service: Surgical Oncology    SENTINEL LYMPH NODE BIOPSY Right        Allergies   Allergen Reactions    Penicillins Hives     As an infant - "lungs collapsed" per mom       Current Outpatient Medications on File Prior to Visit   Medication Sig Dispense Refill    HYDROcodone-acetaminophen (NORCO) 5-325 mg per tablet Take 1 tablet by mouth every 6 (six) hours as needed for pain Max Daily Amount: 4 tablets 12 tablet 0    ibuprofen (MOTRIN) 200 mg tablet Take by mouth every 6 (six) hours as needed for mild pain       No current facility-administered medications on file prior to visit         Social History     Tobacco Use    Smoking status: Never Smoker    Smokeless tobacco: Never Used   Vaping Use    Vaping Use: Never used   Substance Use Topics    Alcohol use: No    Drug use: No       Family History   Problem Relation Age of Onset    Breast cancer Maternal Aunt     Breast cancer Maternal Grandmother     Hodgkin's lymphoma Paternal Grandfather              Procedures Performed     Large joint arthrocentesis  Universal Protocol:  Consent given by: patient    Supporting Documentation  Indications: joint swelling and pain   Procedure Details  Location: knee -   Needle size: 18 G  Approach: lateral    Aspirate amount: 74 mL  Aspirate: cloudy and yellow  Analysis: fluid sample sent for laboratory analysis    Patient tolerance: patient tolerated the procedure well with no immediate complications  Dressing:  Sterile dressing applied              Amber Tobias DO

## 2022-10-06 NOTE — LETTER
October 6, 2022     Patient: Jessica Levin  YOB: 1995  Date of Visit: 10/6/2022      To Whom it May Concern:    Vladislav Hastings is under my professional care  Warren Carpio was seen in my office on 10/6/2022  Please excuse Warren Carpio from work at this time  He will follow up in office after MRI and new work note will be provided at that time  If you have any questions or concerns, please don't hesitate to call           Sincerely,          Kelly Solano, DO        CC: No Recipients

## 2022-10-07 LAB — SCAN RESULT: NORMAL

## 2022-10-09 LAB
BACTERIA SPEC ANAEROBE CULT: NO GROWTH
BACTERIA SPEC BFLD CULT: NO GROWTH
GRAM STN SPEC: NORMAL
GRAM STN SPEC: NORMAL

## 2022-10-31 ENCOUNTER — HOSPITAL ENCOUNTER (OUTPATIENT)
Dept: MRI IMAGING | Facility: CLINIC | Age: 27
Discharge: HOME | End: 2022-10-31

## 2022-10-31 DIAGNOSIS — M25.462 EFFUSION OF LEFT KNEE: ICD-10-CM

## 2022-10-31 DIAGNOSIS — M23.92 INTERNAL DERANGEMENT OF LEFT KNEE: ICD-10-CM

## 2022-10-31 DIAGNOSIS — Z85.79 HISTORY OF LYMPHOMA: ICD-10-CM

## 2022-10-31 RX ADMIN — GADOBUTROL 9 ML: 604.72 INJECTION INTRAVENOUS at 11:27

## 2022-11-03 ENCOUNTER — OFFICE VISIT (OUTPATIENT)
Dept: OBGYN CLINIC | Facility: CLINIC | Age: 27
End: 2022-11-03

## 2022-11-03 VITALS
BODY MASS INDEX: 25.84 KG/M2 | DIASTOLIC BLOOD PRESSURE: 69 MMHG | WEIGHT: 195 LBS | SYSTOLIC BLOOD PRESSURE: 154 MMHG | HEART RATE: 69 BPM | HEIGHT: 73 IN

## 2022-11-03 DIAGNOSIS — M62.81 QUADRICEPS WEAKNESS: Primary | ICD-10-CM

## 2022-11-03 DIAGNOSIS — S83.242D OTHER TEAR OF MEDIAL MENISCUS OF LEFT KNEE AS CURRENT INJURY, SUBSEQUENT ENCOUNTER: ICD-10-CM

## 2022-11-03 DIAGNOSIS — S83.282D OTHER TEAR OF LATERAL MENISCUS OF LEFT KNEE AS CURRENT INJURY, SUBSEQUENT ENCOUNTER: ICD-10-CM

## 2022-11-03 NOTE — LETTER
November 3, 2022     Patient: Annabelle Beck  YOB: 1995  Date of Visit: 11/3/2022      To Whom it May Concern:    Marianna Galindo is under my professional care  Virgilio Nicholson was seen in my office on 11/3/2022  He may not return to work at this time  He will follow up in office in 6 weeks for reevaluation of Left knee  Can provide new work note at that time if needed  If you have any questions or concerns, please don't hesitate to call           Sincerely,          Zoe Butts DO        CC: No Recipients

## 2022-11-03 NOTE — PROGRESS NOTES
Patient Name:  Nathalie Saldaña  MRN:  [id number]    Assessment & Plan     1  Quadriceps weakness  -     Ambulatory Referral to Physical Therapy; Future    2  Other tear of medial meniscus of left knee as current injury, subsequent encounter  -     Ambulatory Referral to Physical Therapy; Future    3  Other tear of lateral meniscus of left knee as current injury, subsequent encounter  -     Ambulatory Referral to Physical Therapy; Future        32 y o  male with Left knee medial and lateral meniscus tears and quad weakness  MRI reviewed in office today with patient and father present upon exam  In light of patients moderate quad weakness, strongly recommended outpatient PT to work on strengthening  Also discussed patients meniscus tears and with patients age and activity level would consider surgical intervention by means of arthroscopy  At this time, patient is not having pain/meniscal symptoms causing pain  Advised patient if he starts outpatient PT for strengthening and starts to notice and increase of Left knee pain, call office for reevaluation and consideration of scheduling surgery  Strongly recommended patient to follow up outpatient with his previous oncologist as part of his previously discussed follow up  Also provided patient a work note to stay out of work until follow up; patient has labor intensive job, lifting packages, twisting, pivoting and would not want patient participating in these maneuvers at this time  He will follow up in office in 6 weeks for reevaluation and further discussion of treatment of Left knee  History of the Present Illness   Gladis Nelson Guard  is a 32 y o  male with Left knee internal derangement  Today, patient reports he is feeling a lot better than previous appointment  He does still have some "tightness" in his calf, but overall improvement  Patient works at 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   He has not yet been back to work secondary to moderate weakness of the Left knee  Review of Systems     Review of Systems   Constitutional: Negative for chills and fever  HENT: Negative for ear pain and sore throat  Eyes: Negative for pain and visual disturbance  Respiratory: Negative for cough and shortness of breath  Cardiovascular: Negative for chest pain and palpitations  Gastrointestinal: Negative for abdominal pain and vomiting  Genitourinary: Negative for dysuria and hematuria  Musculoskeletal: Negative for arthralgias and back pain  Skin: Negative for color change and rash  Neurological: Negative for seizures and syncope  All other systems reviewed and are negative  Physical Exam     /69   Pulse 69   Ht 6' 1" (1 854 m)   Wt 88 5 kg (195 lb)   BMI 25 73 kg/m²     Left Knee  Range of motion from 0 to 140 with minimal pain at terminal flexion  There is no effusion  There is tenderness over the proximal medial calf corresponding to fluid collection  The patient is able to perform a straight leg raise with 4-/5 quad strength  Varus stress testing reveals no instability at 0 and 30 degrees   Valgus stress testing reveals no instability at 0 and 30 degrees  Negative Sherin test  Negative Thessaly test  The patient is neurovascular intact distally  Data Review     I have personally reviewed pertinent films in PACS, and my interpretation follows  X-rays taken 10/03/2022 of Left knee demonstrates well maintained joint spaces, no evidence of fracture or dislocation  MRI performed 10/31/2022 of Left knee demonstrates 1 6 x 3 9 x 1 9cm intramuscular fluid collection, medial meniscus tear, small lateral meniscus tear  No acute fracture       Social History     Tobacco Use   • Smoking status: Never Smoker   • Smokeless tobacco: Never Used   Vaping Use   • Vaping Use: Never used   Substance Use Topics   • Alcohol use: No   • Drug use: No           Procedures  None     Negin Shelby PA-C

## 2022-11-11 ENCOUNTER — EVALUATION (OUTPATIENT)
Dept: PHYSICAL THERAPY | Facility: CLINIC | Age: 27
End: 2022-11-11

## 2022-11-11 DIAGNOSIS — M25.562 ACUTE PAIN OF LEFT KNEE: Primary | ICD-10-CM

## 2022-11-11 DIAGNOSIS — M62.81 QUADRICEPS WEAKNESS: ICD-10-CM

## 2022-11-11 DIAGNOSIS — S83.242D OTHER TEAR OF MEDIAL MENISCUS OF LEFT KNEE AS CURRENT INJURY, SUBSEQUENT ENCOUNTER: ICD-10-CM

## 2022-11-11 DIAGNOSIS — S83.282D OTHER TEAR OF LATERAL MENISCUS OF LEFT KNEE AS CURRENT INJURY, SUBSEQUENT ENCOUNTER: ICD-10-CM

## 2022-11-11 NOTE — PROGRESS NOTES
PT Evaluation     Today's date: 2022  Patient name: Brandi Burgos  : 1995  MRN: [id number]  Referring provider: Donovan Simmons PA-C  Dx:   Encounter Diagnosis     ICD-10-CM    1  Quadriceps weakness  M62 81 Ambulatory Referral to Physical Therapy   2  Other tear of medial meniscus of left knee as current injury, subsequent encounter  S83 242D Ambulatory Referral to Physical Therapy   3  Other tear of lateral meniscus of left knee as current injury, subsequent encounter  S83 282D Ambulatory Referral to Physical Therapy                  Assessment  Assessment details: Patient is a 33 y/o male with chief complaints of left knee pain and weakness  Patient presents with decreased functional mobility due to increased pain and  decreased knee strength associated with medial and lateral meniscus tear  Patient will benefit from skilled physical therapy to address impairment and improve functional mobility  PT needed to allow for return to maximal function and improve quality of life  Impairments: abnormal or restricted ROM, activity intolerance, impaired physical strength, lacks appropriate home exercise program and pain with function  Barriers to therapy: No insurance; high self pay rate   Understanding of Dx/Px/POC: good   Prognosis: good    Goals  STG within 4 weeks:   1  Patient to be independent in HEP  2  Reduce pain by 50% to improve quality of life  3  Improve knee strength to 5/5 with MMT  LTG within 8 weeks:   1  Patient to be independent in ADLs/IADLs without difficulty  2  Patient to exhibit correct squat technique without pain  3  Patient to be able to jog without pain       Plan  Patient would benefit from: skilled physical therapy and PT eval  Planned modality interventions: cryotherapy, hydrotherapy and unattended electrical stimulation  Planned therapy interventions: therapeutic training, therapeutic exercise, therapeutic activities, stretching, strengthening, postural training, patient education, neuromuscular re-education, manual therapy, joint mobilization, IADL retraining, activity modification, ADL retraining, ADL training, body mechanics training, flexibility, functional ROM exercises, gait training, graded activity, graded exercise, graded motor and home exercise program  Frequency: 1x week  Duration in weeks: 8  Plan of Care beginning date: 2022  Plan of Care expiration date: 2023  Treatment plan discussed with: patient        Subjective Evaluation    History of Present Illness  Mechanism of injury: Patient is a 31 y/o male with chief complaints of left knee pain that began at the end of 2022 with insidious onset  He states he's not having much pain, but reports weakness  He states he hasn't been working because his job requires a lot of lifting  He had MRI which showed medial and lateral meniscus tears  He is referred for evaluation and treatment of left knee  Pain  At best pain ratin  At worst pain ratin  Quality: dull ache  Progression: improved    Social Support  Steps to enter house: yes  Stairs in house: yes   Lives in: multiple-level home    Employment status: not working ( at the [employer] )    Diagnostic Tests  MRI studies: abnormal (medial and lateral meniscus tear )  Patient Goals  Patient goal: walk better (without a limp), jog, jump         Objective     Active Range of Motion   Left Knee   Flexion: 130 degrees   Extension: 0 degrees   Extensor la degrees     Right Knee   Flexion: 130 degrees   Extension: 0 degrees   Extensor la degrees     Strength/Myotome Testing     Left Knee   Flexion: 4  Extension: 4  Quadriceps contraction: fair    Right Knee   Normal strength  Quadriceps contraction: good    Tests     Left Knee   Negative valgus stress test at 0 degrees and varus stress test at 0 degrees  Precautions: h/o lymphoma     [facility] [employer]  Access Code: [email or web address]    Increased time spent on patient education with diagnosis, prognosis, goals of therapy, progression of therapy, and plan of care  All questions answered  Patient instructed to call clinic with questions or concerns  Written HEP provided to patient         Manuals 11/11                                                                Neuro Re-Ed             Quad set 3"x20             TKE  Red x10             Standing hip abd/ext Red 2x10 ea                                                                Ther Ex             Recumbent bike NV if time             SLR x10             HS stretch  HEP instruction             Gastroc stretch  HEP instruction                                                                Ther Activity             Mini squat x10                          Gait Training                                       Modalities

## 2022-11-18 ENCOUNTER — OFFICE VISIT (OUTPATIENT)
Dept: PHYSICAL THERAPY | Facility: CLINIC | Age: 27
End: 2022-11-18

## 2022-11-18 DIAGNOSIS — S83.282D OTHER TEAR OF LATERAL MENISCUS OF LEFT KNEE AS CURRENT INJURY, SUBSEQUENT ENCOUNTER: ICD-10-CM

## 2022-11-18 DIAGNOSIS — S83.242D OTHER TEAR OF MEDIAL MENISCUS OF LEFT KNEE AS CURRENT INJURY, SUBSEQUENT ENCOUNTER: ICD-10-CM

## 2022-11-18 DIAGNOSIS — M62.81 QUADRICEPS WEAKNESS: Primary | ICD-10-CM

## 2022-11-18 NOTE — PROGRESS NOTES
Daily Note     Today's date: 2022  Patient name: Minnie Del Toro  : 1995  MRN: [id number]  Referring provider: Ewa Patel PA-C  Dx:   Encounter Diagnosis     ICD-10-CM    1  Quadriceps weakness  M62 81       2  Other tear of medial meniscus of left knee as current injury, subsequent encounter  S83 242D       3  Other tear of lateral meniscus of left knee as current injury, subsequent encounter  S83 282D                      Subjective: Patient denies any pain, 0/10  He reports he still needs to improve strength  He also states his limp is improving  Objective: See treatment diary below      Assessment: Tolerated treatment well  Patient able to progress exercise program to include side step, hamstring curl, goblet squat, step up, and progresses TKE into SLB  Patient would benefit from continued PT with progression each visit  Plan: Continue per plan of care  Precautions: h/o lymphoma     [facility]  Access Code: [email or web address]      Manuals                                                                 Neuro Re-Ed             Quad set 3"x20             TKE  Red x10  Into SLB green x20 ea            Standing hip abd/ext Red 2x10 ea            SLB Floor/foam HEP instruction                                                    Ther Ex             Recumbent bike NV if time  10 min           SLR x10             HS stretch  HEP instruction             Gastroc stretch  HEP instruction            Side step with band   green 20 ft x 4            Goblet squat  20# 2x10            Hamstring curl- prone  green 2x10            LAQ  NV           Deadlift  NV           SL Deadlift  NV           Ther Activity             Mini squat x10             Step up into SLB  15# DB 2x10 ea            Gait Training                                       Modalities

## 2022-12-01 ENCOUNTER — OFFICE VISIT (OUTPATIENT)
Dept: PHYSICAL THERAPY | Facility: CLINIC | Age: 27
End: 2022-12-01

## 2022-12-01 DIAGNOSIS — S83.282D OTHER TEAR OF LATERAL MENISCUS OF LEFT KNEE AS CURRENT INJURY, SUBSEQUENT ENCOUNTER: ICD-10-CM

## 2022-12-01 DIAGNOSIS — S83.242D OTHER TEAR OF MEDIAL MENISCUS OF LEFT KNEE AS CURRENT INJURY, SUBSEQUENT ENCOUNTER: ICD-10-CM

## 2022-12-01 DIAGNOSIS — M62.81 QUADRICEPS WEAKNESS: Primary | ICD-10-CM

## 2022-12-01 DIAGNOSIS — M25.562 ACUTE PAIN OF LEFT KNEE: ICD-10-CM

## 2022-12-01 NOTE — PROGRESS NOTES
Daily Note     Today's date: 2022  Patient name: Eitan Sanchez  : 1995  MRN: [id number]  Referring provider: Derrell Roberts PA-C  Dx:   Encounter Diagnosis     ICD-10-CM    1  Quadriceps weakness  M62 81       2  Other tear of medial meniscus of left knee as current injury, subsequent encounter  S83 242D       3  Other tear of lateral meniscus of left knee as current injury, subsequent encounter  S83 282D       4  Acute pain of left knee  M25 562                      Subjective: Patient reports most difficulty with squatting  States he only did 1-2 times of squatting as an exercise since last visit  His goal is to return to work  Objective: See treatment diary below      Assessment: No pain with palpation of left medial knee or patellar region  No pain with MMT  Tolerated treatment well  Treatment focuses on progression of balance exercises and initiation of work hardening tasks  Patient encouraged to perform his exercises, especially squats through painfree range in order to improve upon technique and endurance  Patient would benefit from continued PT with progression each visit  Plan: Continue per plan of care  Precautions: h/o lymphoma     [facility]  Access Code: [email or web address]      Manuals                                                               Neuro Re-Ed             Quad set 3"x20             TKE  Red x10  Into SLB green x20 ea            Standing hip abd/ext Red 2x10 ea            SLB Floor/foam HEP instruction                                                    Ther Ex             Recumbent bike NV if time  10 min 10 min (unbilled)           SLR x10             HS stretch  HEP instruction             Gastroc stretch  HEP instruction            Side step with band   green 20 ft x 4            Goblet squat  20# 2x10            Hamstring curl- prone  green 2x10            LAQ  NV           Deadlift  NV           SL Deadlift  NV [employer] LOS   Floor/foam Lv 12 x 5 ea           [employer]    2x10           [employer]    15# DB 20 ft x 6           Box lift and carry    Box +15#  x10 w 30 ft carry            Ther Activity             Mini squat x10   2x10           Step up into SLB  15# DB 2x10 ea            Gait Training                                       Modalities

## 2022-12-16 ENCOUNTER — APPOINTMENT (OUTPATIENT)
Dept: PHYSICAL THERAPY | Facility: CLINIC | Age: 27
End: 2022-12-16

## 2022-12-19 ENCOUNTER — APPOINTMENT (OUTPATIENT)
Dept: PHYSICAL THERAPY | Facility: CLINIC | Age: 27
End: 2022-12-19

## 2023-01-19 ENCOUNTER — OFFICE VISIT (OUTPATIENT)
Dept: OBGYN CLINIC | Facility: CLINIC | Age: 28
End: 2023-01-19

## 2023-01-19 VITALS
BODY MASS INDEX: 26.83 KG/M2 | HEIGHT: 73 IN | HEART RATE: 92 BPM | WEIGHT: 202.4 LBS | SYSTOLIC BLOOD PRESSURE: 150 MMHG | DIASTOLIC BLOOD PRESSURE: 84 MMHG

## 2023-01-19 DIAGNOSIS — S83.282D OTHER TEAR OF LATERAL MENISCUS OF LEFT KNEE AS CURRENT INJURY, SUBSEQUENT ENCOUNTER: ICD-10-CM

## 2023-01-19 DIAGNOSIS — S83.242D OTHER TEAR OF MEDIAL MENISCUS OF LEFT KNEE AS CURRENT INJURY, SUBSEQUENT ENCOUNTER: Primary | ICD-10-CM

## 2023-01-19 DIAGNOSIS — M62.81 QUADRICEPS WEAKNESS: ICD-10-CM

## 2023-01-19 RX ORDER — CHLORHEXIDINE GLUCONATE 4 G/100ML
SOLUTION TOPICAL DAILY PRN
Status: CANCELLED | OUTPATIENT
Start: 2023-01-19

## 2023-01-19 NOTE — PROGRESS NOTES
Patient Name:  David Meraz  MRN:  [id number]    Assessment & Plan     1  Other tear of medial meniscus of left knee as current injury, subsequent encounter  -     Case request operating room: ARTHROSCOPY KNEE- Left Knee partial medial and lateral menisectomy vs meniscus repair, all associated procedures; Standing  -     Comprehensive metabolic panel; Future  -     CBC and differential; Future  -     Case request operating room: ARTHROSCOPY KNEE- Left Knee partial medial and lateral menisectomy vs meniscus repair, all associated procedures    2  Quadriceps weakness    3  Other tear of lateral meniscus of left knee as current injury, subsequent encounter  -     Case request operating room: ARTHROSCOPY KNEE- Left Knee partial medial and lateral menisectomy vs meniscus repair, all associated procedures; Standing  -     Comprehensive metabolic panel; Future  -     CBC and differential; Future  -     Case request operating room: ARTHROSCOPY KNEE- Left Knee partial medial and lateral menisectomy vs meniscus repair, all associated procedures        32 y o  male with Left knee medial and lateral meniscus tear and quad weakness  Overall, patient doing well since last appointment in regards to quadriceps strength despite some residual pain of medial knee during certain motions secondary to medial meniscus tear  X-rays and MRI once again reviewed  Discussed the patient's diagnosis and associated treatment options including nonoperative and surgical treatment  Non operative management would include continued outpatient physical therapy, activity modification, oral analgesics, and possible injection therapy  Due to the patient's symptoms of medial knee pain with loading, twisting, and exercises, surgical intervention was recommended secondary to his symptoms and age  Discussed surgical intervention by means of Left knee medial and lateral meniscus repair vs partial menisectomy, all associated procedures   Discussed intraoperative findings, post operative PT, immobilization pending procedure, return to work and return to full function  Did educate patient if meniscus repair to be performed, he would be nonweightbearing for  4-6 weeks post operatively with use of TROM brace  Educated patient on importance/function and anatomy of meniscus  Risks of surgery, including but not limited to, anesthesia complications, infection, damage to nerves and blood vessels, blood clots, postoperative stiffness, recurrent meniscal tearing, posttraumatic arthritis, residual pain, need for subsequent surgery were discussed at length  Benefits of surgery include improved pain, decrease risk of tear propagation  Patient verbalized understanding of the above and wishes to move forward with surgery  Detailed consent obtained in office today and patient will go to OR with Dr Mikey Yeager on 01/25/2023 for Left knee arthroscopic medial and lateral meniscus repair vs partial menisectomy, all associated procedures  History of the Present Illness   Roberto Arenas  is a 32 y o  male with Left knee medial and lateral meniscus tears and quad weakness  Today, patient reports he is doing well since last appointment with improvement of strength  He does admit to occasional medial knee pain with certain exercises, including a squat, when at therapy  He is not doing too much pivoting or twisting as he is not back to work  He has been thinking of moving forward with surgery secondary to persistent knee pain  Review of Systems     Review of Systems   Constitutional: Negative for chills and fever  HENT: Negative for ear pain and sore throat  Eyes: Negative for pain and visual disturbance  Respiratory: Negative for cough and shortness of breath  Cardiovascular: Negative for chest pain and palpitations  Gastrointestinal: Negative for abdominal pain and vomiting  Genitourinary: Negative for dysuria and hematuria     Musculoskeletal: Negative for arthralgias and back pain  Skin: Negative for color change and rash  Neurological: Negative for seizures and syncope  All other systems reviewed and are negative  Physical Exam     /84 (BP Location: Left arm)   Pulse 92   Ht 6' 1" (1 854 m)   Wt 91 8 kg (202 lb 6 4 oz)   BMI 26 70 kg/m²     Left Knee  Range of motion from 0 to 130-140 degrees without pain  There is no effusion  There is no tenderness over the medial or lateral joint line  The patient is able to perform a straight leg raise with quad strength 4+/5 and improved from previous appointment  Varus stress testing reveals no instability at 0 and 30 degrees   Valgus stress testing reveals no instability at 0 and 30 degrees  Negative Sherin  Positive Thessaly test  The patient is neurovascular intact distally  Data Review     I have personally reviewed pertinent films in PACS, and my interpretation follows  X-rays taken 10/03/2022 of Left knee demonstrates well maintained joint spaces, no evidence of fracture or dislocation       MRI performed 10/31/2022 of Left knee demonstrates 1 6 x 3 9 x 1 9cm intramuscular fluid collection, medial meniscus tear, small lateral meniscus tear  No acute fracture       Social History     Tobacco Use   • Smoking status: Never   • Smokeless tobacco: Never   Vaping Use   • Vaping Use: Never used   Substance Use Topics   • Alcohol use: No   • Drug use: No           Procedures  None     Bernadette Hinojosa DO

## 2023-01-19 NOTE — H&P (VIEW-ONLY)
Patient Name:  Bryon Massey  MRN:  [id number]    Assessment & Plan     1  Other tear of medial meniscus of left knee as current injury, subsequent encounter  -     Case request operating room: ARTHROSCOPY KNEE- Left Knee partial medial and lateral menisectomy vs meniscus repair, all associated procedures; Standing  -     Comprehensive metabolic panel; Future  -     CBC and differential; Future  -     Case request operating room: ARTHROSCOPY KNEE- Left Knee partial medial and lateral menisectomy vs meniscus repair, all associated procedures    2  Quadriceps weakness    3  Other tear of lateral meniscus of left knee as current injury, subsequent encounter  -     Case request operating room: ARTHROSCOPY KNEE- Left Knee partial medial and lateral menisectomy vs meniscus repair, all associated procedures; Standing  -     Comprehensive metabolic panel; Future  -     CBC and differential; Future  -     Case request operating room: ARTHROSCOPY KNEE- Left Knee partial medial and lateral menisectomy vs meniscus repair, all associated procedures        32 y o  male with Left knee medial and lateral meniscus tear and quad weakness  Overall, patient doing well since last appointment in regards to quadriceps strength despite some residual pain of medial knee during certain motions secondary to medial meniscus tear  X-rays and MRI once again reviewed  Discussed the patient's diagnosis and associated treatment options including nonoperative and surgical treatment  Non operative management would include continued outpatient physical therapy, activity modification, oral analgesics, and possible injection therapy  Due to the patient's symptoms of medial knee pain with loading, twisting, and exercises, surgical intervention was recommended secondary to his symptoms and age  Discussed surgical intervention by means of Left knee medial and lateral meniscus repair vs partial menisectomy, all associated procedures   Discussed intraoperative findings, post operative PT, immobilization pending procedure, return to work and return to full function  Did educate patient if meniscus repair to be performed, he would be nonweightbearing for  4-6 weeks post operatively with use of TROM brace  Educated patient on importance/function and anatomy of meniscus  Risks of surgery, including but not limited to, anesthesia complications, infection, damage to nerves and blood vessels, blood clots, postoperative stiffness, recurrent meniscal tearing, posttraumatic arthritis, residual pain, need for subsequent surgery were discussed at length  Benefits of surgery include improved pain, decrease risk of tear propagation  Patient verbalized understanding of the above and wishes to move forward with surgery  Detailed consent obtained in office today and patient will go to OR with Dr Mykel Blancas on 01/25/2023 for Left knee arthroscopic medial and lateral meniscus repair vs partial menisectomy, all associated procedures  History of the Present Illness   Duncan Santos  is a 32 y o  male with Left knee medial and lateral meniscus tears and quad weakness  Today, patient reports he is doing well since last appointment with improvement of strength  He does admit to occasional medial knee pain with certain exercises, including a squat, when at therapy  He is not doing too much pivoting or twisting as he is not back to work  He has been thinking of moving forward with surgery secondary to persistent knee pain  Review of Systems     Review of Systems   Constitutional: Negative for chills and fever  HENT: Negative for ear pain and sore throat  Eyes: Negative for pain and visual disturbance  Respiratory: Negative for cough and shortness of breath  Cardiovascular: Negative for chest pain and palpitations  Gastrointestinal: Negative for abdominal pain and vomiting  Genitourinary: Negative for dysuria and hematuria     Musculoskeletal: Negative for arthralgias and back pain  Skin: Negative for color change and rash  Neurological: Negative for seizures and syncope  All other systems reviewed and are negative  Physical Exam     /84 (BP Location: Left arm)   Pulse 92   Ht 6' 1" (1 854 m)   Wt 91 8 kg (202 lb 6 4 oz)   BMI 26 70 kg/m²     Left Knee  Range of motion from 0 to 130-140 degrees without pain  There is no effusion  There is no tenderness over the medial or lateral joint line  The patient is able to perform a straight leg raise with quad strength 4+/5 and improved from previous appointment  Varus stress testing reveals no instability at 0 and 30 degrees   Valgus stress testing reveals no instability at 0 and 30 degrees  Negative Sherin  Positive Thessaly test  The patient is neurovascular intact distally  Data Review     I have personally reviewed pertinent films in PACS, and my interpretation follows  X-rays taken 10/03/2022 of Left knee demonstrates well maintained joint spaces, no evidence of fracture or dislocation       MRI performed 10/31/2022 of Left knee demonstrates 1 6 x 3 9 x 1 9cm intramuscular fluid collection, medial meniscus tear, small lateral meniscus tear  No acute fracture       Social History     Tobacco Use   • Smoking status: Never   • Smokeless tobacco: Never   Vaping Use   • Vaping Use: Never used   Substance Use Topics   • Alcohol use: No   • Drug use: No           Procedures  None     Juancarlos Strong DO

## 2023-01-23 ENCOUNTER — TELEPHONE (OUTPATIENT)
Dept: OBGYN CLINIC | Facility: CLINIC | Age: 28
End: 2023-01-23

## 2023-01-23 ENCOUNTER — ANESTHESIA EVENT (OUTPATIENT)
Dept: PERIOP | Facility: HOSPITAL | Age: 28
End: 2023-01-23

## 2023-01-23 NOTE — TELEPHONE ENCOUNTER
Called patient to remind him he needs to have lab work done before surgery on 01/25/23     [facility] for patient

## 2023-01-24 ENCOUNTER — APPOINTMENT (OUTPATIENT)
Dept: LAB | Facility: CLINIC | Age: 28
End: 2023-01-24

## 2023-01-24 DIAGNOSIS — S83.242D OTHER TEAR OF MEDIAL MENISCUS OF LEFT KNEE AS CURRENT INJURY, SUBSEQUENT ENCOUNTER: ICD-10-CM

## 2023-01-24 DIAGNOSIS — S83.282D OTHER TEAR OF LATERAL MENISCUS OF LEFT KNEE AS CURRENT INJURY, SUBSEQUENT ENCOUNTER: ICD-10-CM

## 2023-01-24 LAB
ALBUMIN SERPL BCP-MCNC: 4.1 G/DL (ref 3.5–5)
ALP SERPL-CCNC: 51 U/L (ref 46–116)
ALT SERPL W P-5'-P-CCNC: 15 U/L (ref 12–78)
ANION GAP SERPL CALCULATED.3IONS-SCNC: 5 MMOL/L (ref 4–13)
AST SERPL W P-5'-P-CCNC: 13 U/L (ref 5–45)
BASOPHILS # BLD AUTO: 0.09 THOUSANDS/ÂΜL (ref 0–0.1)
BASOPHILS NFR BLD AUTO: 1 % (ref 0–1)
BILIRUB SERPL-MCNC: 0.32 MG/DL (ref 0.2–1)
BUN SERPL-MCNC: 14 MG/DL (ref 5–25)
CALCIUM SERPL-MCNC: 9.1 MG/DL (ref 8.3–10.1)
CHLORIDE SERPL-SCNC: 107 MMOL/L (ref 96–108)
CO2 SERPL-SCNC: 26 MMOL/L (ref 21–32)
CREAT SERPL-MCNC: 1.06 MG/DL (ref 0.6–1.3)
EOSINOPHIL # BLD AUTO: 0.61 THOUSAND/ÂΜL (ref 0–0.61)
EOSINOPHIL NFR BLD AUTO: 7 % (ref 0–6)
ERYTHROCYTE [DISTWIDTH] IN BLOOD BY AUTOMATED COUNT: 13.4 % (ref 11.6–15.1)
GFR SERPL CREATININE-BSD FRML MDRD: 94 ML/MIN/1.73SQ M
GLUCOSE P FAST SERPL-MCNC: 95 MG/DL (ref 65–99)
HCT VFR BLD AUTO: 45.7 % (ref 36.5–49.3)
HGB BLD-MCNC: 15 G/DL (ref 12–17)
IMM GRANULOCYTES # BLD AUTO: 0.03 THOUSAND/UL (ref 0–0.2)
IMM GRANULOCYTES NFR BLD AUTO: 0 % (ref 0–2)
LYMPHOCYTES # BLD AUTO: 3.63 THOUSANDS/ÂΜL (ref 0.6–4.47)
LYMPHOCYTES NFR BLD AUTO: 42 % (ref 14–44)
MCH RBC QN AUTO: 29.3 PG (ref 26.8–34.3)
MCHC RBC AUTO-ENTMCNC: 32.8 G/DL (ref 31.4–37.4)
MCV RBC AUTO: 89 FL (ref 82–98)
MONOCYTES # BLD AUTO: 0.57 THOUSAND/ÂΜL (ref 0.17–1.22)
MONOCYTES NFR BLD AUTO: 7 % (ref 4–12)
NEUTROPHILS # BLD AUTO: 3.64 THOUSANDS/ÂΜL (ref 1.85–7.62)
NEUTS SEG NFR BLD AUTO: 43 % (ref 43–75)
NRBC BLD AUTO-RTO: 0 /100 WBCS
PLATELET # BLD AUTO: 206 THOUSANDS/UL (ref 149–390)
PMV BLD AUTO: 11.6 FL (ref 8.9–12.7)
POTASSIUM SERPL-SCNC: 3.7 MMOL/L (ref 3.5–5.3)
PROT SERPL-MCNC: 7.2 G/DL (ref 6.4–8.4)
RBC # BLD AUTO: 5.12 MILLION/UL (ref 3.88–5.62)
SODIUM SERPL-SCNC: 138 MMOL/L (ref 135–147)
WBC # BLD AUTO: 8.57 THOUSAND/UL (ref 4.31–10.16)

## 2023-01-24 NOTE — TELEPHONE ENCOUNTER
Called patient and LVM in regards to lab work he needs to have it done today for us to be able to proceed with surgery

## 2023-01-24 NOTE — TELEPHONE ENCOUNTER
Spoke with patient dad made him aware I am trying to reach him he will  Have him call me at [phone]

## 2023-01-25 ENCOUNTER — HOSPITAL ENCOUNTER (OUTPATIENT)
Facility: HOSPITAL | Age: 28
Setting detail: OUTPATIENT SURGERY
Discharge: HOME | End: 2023-01-25
Attending: ORTHOPAEDIC SURGERY | Admitting: ORTHOPAEDIC SURGERY

## 2023-01-25 ENCOUNTER — ANESTHESIA (OUTPATIENT)
Dept: PERIOP | Facility: HOSPITAL | Age: 28
End: 2023-01-25

## 2023-01-25 VITALS
RESPIRATION RATE: 14 BRPM | DIASTOLIC BLOOD PRESSURE: 68 MMHG | OXYGEN SATURATION: 100 % | TEMPERATURE: 98.6 F | SYSTOLIC BLOOD PRESSURE: 129 MMHG | WEIGHT: 200.18 LBS | BODY MASS INDEX: 26.53 KG/M2 | HEART RATE: 82 BPM | HEIGHT: 73 IN

## 2023-01-25 DIAGNOSIS — S83.242D TEAR OF MEDIAL MENISCUS OF LEFT KNEE, CURRENT, UNSPECIFIED TEAR TYPE, SUBSEQUENT ENCOUNTER: Primary | ICD-10-CM

## 2023-01-25 RX ORDER — ONDANSETRON 4 MG/1
4 TABLET, FILM COATED ORAL EVERY 8 HOURS PRN
Qty: 20 TABLET | Refills: 0 | Status: SHIPPED | OUTPATIENT
Start: 2023-01-25

## 2023-01-25 RX ORDER — ONDANSETRON 2 MG/ML
4 INJECTION INTRAMUSCULAR; INTRAVENOUS EVERY 6 HOURS PRN
Status: DISCONTINUED | OUTPATIENT
Start: 2023-01-25 | End: 2023-01-25 | Stop reason: HOSPADM

## 2023-01-25 RX ORDER — DEXAMETHASONE SODIUM PHOSPHATE 10 MG/ML
INJECTION, SOLUTION INTRAMUSCULAR; INTRAVENOUS AS NEEDED
Status: DISCONTINUED | OUTPATIENT
Start: 2023-01-25 | End: 2023-01-25

## 2023-01-25 RX ORDER — SODIUM CHLORIDE, SODIUM LACTATE, POTASSIUM CHLORIDE, CALCIUM CHLORIDE 600; 310; 30; 20 MG/100ML; MG/100ML; MG/100ML; MG/100ML
50 INJECTION, SOLUTION INTRAVENOUS CONTINUOUS
Status: DISCONTINUED | OUTPATIENT
Start: 2023-01-25 | End: 2023-01-25 | Stop reason: HOSPADM

## 2023-01-25 RX ORDER — FENTANYL CITRATE 50 UG/ML
INJECTION, SOLUTION INTRAMUSCULAR; INTRAVENOUS AS NEEDED
Status: DISCONTINUED | OUTPATIENT
Start: 2023-01-25 | End: 2023-01-25

## 2023-01-25 RX ORDER — BUPIVACAINE HYDROCHLORIDE 5 MG/ML
INJECTION, SOLUTION EPIDURAL; INTRACAUDAL AS NEEDED
Status: DISCONTINUED | OUTPATIENT
Start: 2023-01-25 | End: 2023-01-25 | Stop reason: HOSPADM

## 2023-01-25 RX ORDER — OXYCODONE HYDROCHLORIDE AND ACETAMINOPHEN 5; 325 MG/1; MG/1
1 TABLET ORAL ONCE
Status: DISCONTINUED | OUTPATIENT
Start: 2023-01-25 | End: 2023-01-25 | Stop reason: HOSPADM

## 2023-01-25 RX ORDER — EPHEDRINE SULFATE 50 MG/ML
INJECTION INTRAVENOUS AS NEEDED
Status: DISCONTINUED | OUTPATIENT
Start: 2023-01-25 | End: 2023-01-25

## 2023-01-25 RX ORDER — ONDANSETRON 2 MG/ML
INJECTION INTRAMUSCULAR; INTRAVENOUS AS NEEDED
Status: DISCONTINUED | OUTPATIENT
Start: 2023-01-25 | End: 2023-01-25

## 2023-01-25 RX ORDER — OXYCODONE HYDROCHLORIDE AND ACETAMINOPHEN 5; 325 MG/1; MG/1
1 TABLET ORAL EVERY 4 HOURS PRN
Qty: 7 TABLET | Refills: 0 | Status: SHIPPED | OUTPATIENT
Start: 2023-01-25

## 2023-01-25 RX ORDER — CHLORHEXIDINE GLUCONATE 4 G/100ML
SOLUTION TOPICAL DAILY PRN
Status: DISCONTINUED | OUTPATIENT
Start: 2023-01-25 | End: 2023-01-25 | Stop reason: HOSPADM

## 2023-01-25 RX ORDER — HYDROMORPHONE HCL/PF 1 MG/ML
0.4 SYRINGE (ML) INJECTION
Status: DISCONTINUED | OUTPATIENT
Start: 2023-01-25 | End: 2023-01-25 | Stop reason: HOSPADM

## 2023-01-25 RX ORDER — ONDANSETRON 2 MG/ML
4 INJECTION INTRAMUSCULAR; INTRAVENOUS ONCE AS NEEDED
Status: DISCONTINUED | OUTPATIENT
Start: 2023-01-25 | End: 2023-01-25 | Stop reason: HOSPADM

## 2023-01-25 RX ORDER — LIDOCAINE HYDROCHLORIDE 10 MG/ML
0.5 INJECTION, SOLUTION EPIDURAL; INFILTRATION; INTRACAUDAL; PERINEURAL ONCE AS NEEDED
Status: DISCONTINUED | OUTPATIENT
Start: 2023-01-25 | End: 2023-01-25 | Stop reason: HOSPADM

## 2023-01-25 RX ORDER — PROPOFOL 10 MG/ML
INJECTION, EMULSION INTRAVENOUS AS NEEDED
Status: DISCONTINUED | OUTPATIENT
Start: 2023-01-25 | End: 2023-01-25

## 2023-01-25 RX ORDER — LIDOCAINE HYDROCHLORIDE 20 MG/ML
INJECTION, SOLUTION EPIDURAL; INFILTRATION; INTRACAUDAL; PERINEURAL AS NEEDED
Status: DISCONTINUED | OUTPATIENT
Start: 2023-01-25 | End: 2023-01-25

## 2023-01-25 RX ORDER — MIDAZOLAM HYDROCHLORIDE 2 MG/2ML
INJECTION, SOLUTION INTRAMUSCULAR; INTRAVENOUS AS NEEDED
Status: DISCONTINUED | OUTPATIENT
Start: 2023-01-25 | End: 2023-01-25

## 2023-01-25 RX ORDER — IBUPROFEN 800 MG/1
800 TABLET ORAL EVERY 8 HOURS PRN
Qty: 30 TABLET | Refills: 0 | Status: SHIPPED | OUTPATIENT
Start: 2023-01-25

## 2023-01-25 RX ORDER — ASPIRIN 81 MG/1
81 TABLET ORAL 2 TIMES DAILY
Qty: 28 TABLET | Refills: 0 | Status: SHIPPED | OUTPATIENT
Start: 2023-01-25

## 2023-01-25 RX ORDER — VANCOMYCIN HYDROCHLORIDE 1 G/200ML
1000 INJECTION, SOLUTION INTRAVENOUS ONCE
Status: COMPLETED | OUTPATIENT
Start: 2023-01-25 | End: 2023-01-25

## 2023-01-25 RX ORDER — DEXMEDETOMIDINE HYDROCHLORIDE 100 UG/ML
INJECTION, SOLUTION INTRAVENOUS AS NEEDED
Status: DISCONTINUED | OUTPATIENT
Start: 2023-01-25 | End: 2023-01-25

## 2023-01-25 RX ORDER — IBUPROFEN 400 MG/1
800 TABLET ORAL ONCE
Status: DISCONTINUED | OUTPATIENT
Start: 2023-01-25 | End: 2023-01-25 | Stop reason: HOSPADM

## 2023-01-25 RX ADMIN — FENTANYL CITRATE 25 MCG: 50 INJECTION, SOLUTION INTRAMUSCULAR; INTRAVENOUS at 11:30

## 2023-01-25 RX ADMIN — DEXMEDETOMIDINE HCL 12 MCG: 100 INJECTION INTRAVENOUS at 11:02

## 2023-01-25 RX ADMIN — ONDANSETRON 4 MG: 2 INJECTION INTRAMUSCULAR; INTRAVENOUS at 11:05

## 2023-01-25 RX ADMIN — VANCOMYCIN HYDROCHLORIDE 1000 MG: 1 INJECTION, SOLUTION INTRAVENOUS at 10:45

## 2023-01-25 RX ADMIN — LIDOCAINE HYDROCHLORIDE 100 MG: 20 INJECTION, SOLUTION EPIDURAL; INFILTRATION; INTRACAUDAL; PERINEURAL at 11:04

## 2023-01-25 RX ADMIN — PROPOFOL 250 MG: 10 INJECTION, EMULSION INTRAVENOUS at 11:05

## 2023-01-25 RX ADMIN — EPHEDRINE SULFATE 5 MG: 50 INJECTION, SOLUTION INTRAVENOUS at 11:44

## 2023-01-25 RX ADMIN — SODIUM CHLORIDE, SODIUM LACTATE, POTASSIUM CHLORIDE, AND CALCIUM CHLORIDE 50 ML/HR: .6; .31; .03; .02 INJECTION, SOLUTION INTRAVENOUS at 08:09

## 2023-01-25 RX ADMIN — MIDAZOLAM 2 MG: 1 INJECTION INTRAMUSCULAR; INTRAVENOUS at 10:58

## 2023-01-25 RX ADMIN — DEXAMETHASONE SODIUM PHOSPHATE 10 MG: 10 INJECTION, SOLUTION INTRAMUSCULAR; INTRAVENOUS at 11:14

## 2023-01-25 RX ADMIN — FENTANYL CITRATE 25 MCG: 50 INJECTION, SOLUTION INTRAMUSCULAR; INTRAVENOUS at 11:51

## 2023-01-25 RX ADMIN — FENTANYL CITRATE 50 MCG: 50 INJECTION, SOLUTION INTRAMUSCULAR; INTRAVENOUS at 11:09

## 2023-01-25 NOTE — OP NOTE
OPERATIVE REPORT  PATIENT NAME: Adrianna Sauceda  :  1995  MRN: [id number]  Pt Location: [facility] OR ROOM 04    SURGERY DATE: 2023    Surgeon(s) and Role:     * Julian Joshi DO - Primary     * Thaddeus Culp PA-C - Assisting    Preop Diagnosis:  Other tear of medial meniscus of left knee as current injury, subsequent encounter [S83 242D]  Other tear of lateral meniscus of left knee as current injury, subsequent encounter [S83 282D]     Postop diagnosis:  Medial meniscus tear left knee    Procedure(s):  Left - LEFT knee arthroscopy  partial medial meniscectomy    Specimen(s):  * No specimens in log *    Estimated Blood Loss:   5 mL    Drains:  * No LDAs found *    Anesthesia Type:   General, 20 cc 0 5% Marcaine local anesthetic    Operative Indications: Other tear of medial meniscus of left knee as current injury, subsequent encounter [S83 242D]   Persistent pain despite non operative treatment including oral analgesics, activity modification, and formal physical therapy  Operative Findings:    Left knee medial meniscus tear at anterior horn body junction with unstable flap    Complications:   None    Procedure and Technique:   Antibiotics:  1 g vancomycin      The patient was seen in the preoperative holding area and the appropriate site of surgery was confirmed and marked  Upon bringing the patient back to the operating room he was placed supine on the operating room table  A tourniquet was placed on the thigh of the operative leg and the leg was placed in the appropriate leg harry  The well leg was placed on a well-padded pillow  Esmarch was used to exsanguinate the leg and tourniquet was inflated  The left lower extremity was prepped and draped in the usual sterile fashion  An appropriate preoperative time-out was performed to once again confirm the site of surgery and procedure  A lateral incision was made for an arthroscopic viewing portal and the arthroscopic camera was inserted  A diagnostic arthroscopy was performed and displayed  Patellofemoral articular surfaces to be intact  A medial arthroscopic portal was made under direct visualization with the aid of an 18 gauge spinal needle  An arthroscopic probe was inserted and the remainder of the diagnostic arthroscopy was performed which further showed  Tibial and femoral articular surfaces to be intact  Medial meniscus was noted to be diminutive in nature at the posterior horn body junction and body with associated meniscal tear with unstable flap  Upon moving to the intercondylar notch anterior cruciate ligament was noted to be intact  Upon moving to the lateral compartment, no articular defects were noted  Lateral meniscus was intact with signs of prior healed meniscus tear at posterior horn/ body junction  Attention was turned back to medial meniscus tear  An arthroscopic shaver was inserted to debride loose ends of the unstable flap  Arthroscopic biter was also used to further remove the torn edge to a nice smooth contour more posteriorly into the posterior horn  This was further debrided with an arthroscopic shaver once again  Arthroscopic camera was then placed into the medial portal and shaver was used from the lateral portal to contour the more anterior aspect of the tear  The arthroscopic probe was once again inserted to ensure stable edges of the meniscus status post meniscectomy  Arthroscopic incisions were closed with 3-0 nylon suture  A sterile dressing was applied  The patient tolerated the procedure well no complications were noted  The patient was transferred to the PACU without any issue       I was present for the entire procedure, A qualified resident physician was not available and A physician assistant, Scott Jacobsen PA-C was required during the procedure for   Assistance with arthroscopic camera equipment due to the minimally invasive nature of the surgical case and suturing    Patient Disposition:  PACU         SIGNATURE: Mike Oneill, DO  DATE: January 25, 2023  TIME: 5:11 PM

## 2023-01-25 NOTE — INTERVAL H&P NOTE
H&P reviewed  After examining the patient I find no changes in the patients condition since the H&P had been written  Patient was seen and evaluated in the office where continued nonoperative vs surgical management of Left knee medial and lateral meniscus tears was discussed  In light of patients persistent pain, swelling, patient would like to move forward with surgical intervention  Risks of surgical intervention discussed in the office with patient and detailed consent obtained  Patient will go to OR on 01/25/2023 with Dr Mark Arriaza for Left knee arthroscopic medial and lateral meniscus repair, possible partial menisectomies, all associated procedures       14 point ROS in office   Musculoskeletal Left knee pain      Heart RRR  Lungs CTA BL       Vitals:    01/25/23 0759   BP: 143/93   Pulse: (!) 106   Resp: 19   Temp: 98 1 °F (36 7 °C)   SpO2: 100%

## 2023-01-25 NOTE — DISCHARGE INSTR - AVS FIRST PAGE
Knee Surgery:  Postoperative Instructions  Dr Georges Curling may resume your regular diet as soon as possible    Medication    Take the pain medication as prescribed   Take pain medication with food   While taking pain medications, you may NOT operate a vehicle, heavy machinery, or appliances   While taking pain medication, you may NOT drink alcoholic beverages   If you have any reactions to your medications, stop taking them and call my office   If you are not allergic, take one aspirin 81mg twice a day to help prevent blood clots   Please keep in mind that constipation is a very common side effect of taking narcotic pain medication  Take precautions to prevent constipation:  o Drink plenty of water (6-8 glasses of 8 oz  a day)  o Avoid alcohol, caffeine, and dairy products  o Eat plenty of fiber (fruits, vegetables, and whole grains)  o Take an over the counter stool softener (Colace or Dulcolax)    Activity    RANGE OF MOTION   _____ You may bend your knee as much as the dressings will allow     WEIGHT BEARING   _____ You may weight bear as tolerated     You may practice quadriceps muscle tightening and straight leg raises several times every hour  Please continue to move your ankle up and down and tighten and relax your calf muscles several times every hour to help reduce swelling and prevent blood clots  You may use your crutches for balance as needed until your first post operative visit  The optimal position of the leg after surgery is for you to be lying flat with your ankle higher than your knee and higher than your heart, in an effort to reduce swelling  It is important to continuously elevate your knee above your heart until your swelling is completely down  Please keep ice applied to the knee for at least the first 72 hours or as long as pain or swelling persists  Do not apply ice directly to skin, or allow water to leak on your dressing      Showering    You may shower 4 days after surgery unless told otherwise  DO NOT immerse the knee under water and DO NOT rub the incision  Pad the incisions dry and place new Band-Aids over the sutures after showering  If you have a brace, you may remove it to shower  Keep your knee straight in the shower  You may sponge bathe for the first 3 days, taking care to keep the dressing clean and dry  Dressing Care    Keep the dressing clean and dry  It is normal to get some bloody wound seepage through the bandage  DO NOT BE ALARMED  If the dressing gets soaked with wound seepage, please reinforce with a dry sterile dressing  Loosen the ace wrap around your knee if it becomes too tight or painful  Remove all dressings 3 days after surgery  If there is still some wound seepage, apply a fresh STERILE gauze over the incisions and secure with tape or an ace wrap  DO NOT TOUCH OR REMOVE THE SUTURES!! Emergency/Follow-Up   Please notify my office at [phone] if you develop any fever (101 deg or above), unexpected warmth, redness or swelling  Please call if your toes become cold, purple, numb, or there is excessive bleeding  Please call the office within 24 hours at [phone] to schedule a follow up appt within 7-10 days from surgery

## 2023-01-25 NOTE — ANESTHESIA PREPROCEDURE EVALUATION
Procedure:  ARTHROSCOPY KNEE- Left Knee partial medial and lateral menisectomy vs meniscus repair, all associated procedures (Left: Knee)    Relevant Problems   GI/HEPATIC   (+) Dysphagia      HEMATOLOGY   (+) Idiopathic thrombocytopenic purpura (HCC)   (+) Stage IIA lymphocyte-predominant Hodgkin's disease ([facility] [address] )      LMA 4 in 2016    Denies recent fever, cough or other symptom of upper respiratory tract infection  Confirmed NPO appropriate    Physical Exam    Airway    Mallampati score: I  TM Distance: >3 FB  Neck ROM: full     Dental   No notable dental hx     Cardiovascular      Pulmonary      Other Findings        Anesthesia Plan  ASA Score- 2     Anesthesia Type- general with ASA Monitors  Additional Monitors:   Airway Plan: LMA  Plan Factors-Exercise tolerance (METS): >4 METS  Chart reviewed  Existing labs reviewed  Patient summary reviewed  Induction- intravenous  Postoperative Plan- Plan for postoperative opioid use  Planned trial extubation    Informed Consent- Anesthetic plan and risks discussed with patient

## 2023-01-25 NOTE — ANESTHESIA POSTPROCEDURE EVALUATION
Post-Op Assessment Note    CV Status:  Stable  Pain Score: 1    Pain management: adequate     Mental Status:  Arousable and sleepy   Hydration Status:  Euvolemic   PONV Controlled:  Controlled   Airway Patency:  Patent      Post Op Vitals Reviewed: Yes      Staff: CRNA         No notable events documented      BP   118/59   Temp 98   Pulse 97   Resp 18   SpO2 100% RA

## 2023-01-30 ENCOUNTER — EVALUATION (OUTPATIENT)
Dept: PHYSICAL THERAPY | Facility: CLINIC | Age: 28
End: 2023-01-30

## 2023-01-30 DIAGNOSIS — S83.242D TEAR OF MEDIAL MENISCUS OF LEFT KNEE, CURRENT, UNSPECIFIED TEAR TYPE, SUBSEQUENT ENCOUNTER: Primary | ICD-10-CM

## 2023-01-30 NOTE — PROGRESS NOTES
PT Evaluation     Today's date: 2023  Patient name: Adrianna Sauceda  : 1995  MRN: [id number]  Referring provider: Alberto Guan PA-C  Dx:   Encounter Diagnosis     ICD-10-CM    1  Tear of medial meniscus of left knee, current, unspecified tear type, subsequent encounter  S83 242D Ambulatory Referral to Physical Therapy          Start Time: 1031  Stop Time: 1105  Total time in clinic (min): 34 minutes    Assessment  Assessment details: Patient is a 32year old male who presents to OP PT s/p L partial menisectomy  The surgery was performed on 23  Patient presents with intact LE sensation and good LLE balance  Deficits include knee swelling, slightly decreased knee ROM, LLE weakness, and antalgic gait,   Functionally, they have difficulty walking, negotiating stairs, standing for prolonged periods and squatting  Patient was given quad set, SLR, and mini squats as HEP  Patient would benefit from skilled PT services in order to address these deficits and return to PLOF  Impairments: abnormal gait, abnormal or restricted ROM, activity intolerance, impaired physical strength, lacks appropriate home exercise program, pain with function and weight-bearing intolerance  Understanding of Dx/Px/POC: good   Prognosis: good    Goals  ST  Patient will be independent with HEP in 3 weeks  2  Patient will decrease self reported pain by 2 points in 3 weeks  LT  Patient will be able to ambulate with normal gait pattern in 6 weeks  2  Patient will be able to stand for >30 minutes without limitation in 6 weeks  3  Patient will be able to negotiate stairs without limitation in 6 weeks  4  Patient will be able to return to the gym without limitation in 6 weeks  5   Patient will be able to return to work without limitation in 6 weeks    Plan  Patient would benefit from: skilled physical therapy  Planned modality interventions: cryotherapy and thermotherapy: hydrocollator packs  Planned therapy interventions: home exercise program, graded exercise, graded activity, functional ROM exercises, therapeutic exercise, therapeutic activities, stretching, strengthening, patient education, neuromuscular re-education, balance/weight bearing training, balance and manual therapy  Frequency: 3x week (2-3x/week)  Duration in visits: 18  Duration in weeks: 6  Plan of Care beginning date: 2023  Plan of Care expiration date: 3/13/2023  Treatment plan discussed with: patient        Subjective Evaluation    History of Present Illness  Date of surgery: 2023  Mechanism of injury: Patient reports to OP PT s/p L knee partial menisectomy on 23  Patient states that one day he woke up and noticed that his L knee was swollen and very painful  RITA unknown  Patient reports that he has some trouble walking and lifting heavy objects  Patient enjoys working out and wants to get back to that  Pain  Current pain ratin  At best pain ratin  At worst pain rating: 3  Location: L knee  Quality: dull ache  Relieving factors: rest  Aggravating factors: lifting, walking, stair climbing and running    Social Support  Steps to enter house: yes  2  Stairs in house: yes   12  Lives with: parents    Employment status: working  Patient Goals  Patient goals for therapy: increased strength, independence with ADLs/IADLs, return to sport/leisure activities and return to work  Patient goal: basketball, workout        Objective     Tenderness     Additional Tenderness Details  Tender just over incision sites    Neurological Testing     Sensation     Knee   Left Knee   Intact: light touch    Right Knee   Intact: light touch     Active Range of Motion   Left Knee   Flexion: 133 degrees with pain  Extension: -2 degrees     Additional Active Range of Motion Details  Fair L quad contraction    Mobility   Patellar Mobility:   Left Knee   WFL: medial, lateral, superior and inferior       Patellar Static Positioning   Left Knee: [facility] Knee: [facility]    Strength/Myotome Testing     Left Knee   Flexion: 4 (pain)  Extension: 4 (pain)    Right Knee   Flexion: 5  Extension: 5    Ambulation     Ambulation: Level Surfaces   Ambulation without assistive device: independent    Additional Level Surfaces Ambulation Details  Slight antalgic gait- decreased L knee flexion during swing and decreased stance time on L compared to R    Ambulation: Stairs     Additional Stairs Ambulation Details  Ascends and descends reciprocally without handrail- decreased L knee flexion compensated with increased L hip flexion when ascending, decreased eccentric control with R leg leading when descending    General Comments:      Knee Comments  L Inferior patella= 38 cm  R Inferior patella= 36 cm    L SLS= 30 seconds no trunk sway             Precautions: hx of cancer      Manuals 1/30                                                                Neuro Re-Ed             SLS             Y balance             Heel tap                                                                 Ther Ex             Quad set HEP            SLR HEP            TB side steps             SL hip ABD             LAQ                                                    Ther Activity             Mini squat HEP            STS             TRX squat             Ball squat             Stairs             Box jump                          Gait Training                                       Modalities

## 2023-02-02 ENCOUNTER — APPOINTMENT (OUTPATIENT)
Dept: PHYSICAL THERAPY | Facility: CLINIC | Age: 28
End: 2023-02-02

## 2023-02-02 ENCOUNTER — OFFICE VISIT (OUTPATIENT)
Dept: OBGYN CLINIC | Facility: CLINIC | Age: 28
End: 2023-02-02

## 2023-02-02 VITALS
HEART RATE: 88 BPM | DIASTOLIC BLOOD PRESSURE: 74 MMHG | BODY MASS INDEX: 26.51 KG/M2 | SYSTOLIC BLOOD PRESSURE: 134 MMHG | WEIGHT: 200 LBS | HEIGHT: 73 IN

## 2023-02-02 DIAGNOSIS — S83.242D OTHER TEAR OF MEDIAL MENISCUS OF LEFT KNEE AS CURRENT INJURY, SUBSEQUENT ENCOUNTER: ICD-10-CM

## 2023-02-02 DIAGNOSIS — Z98.890 S/P LEFT KNEE ARTHROSCOPY: Primary | ICD-10-CM

## 2023-02-02 NOTE — PROGRESS NOTES
Patient Name:  Mireya Bower  MRN:  [id number]    Assessment & Plan     1  S/P left knee arthroscopy    2  Other tear of medial meniscus of left knee as current injury, subsequent encounter        32 y o  male approximately 8 days status post Left knee arthroscopy with partial medial meniscectomy performed on 1/25/2023  Intraoperative arthroscopic images reviewed with patient and sutures removed without complications  At this time, advised patient he may start with outpatient PT to work on strengthening and range of motion  Verbalized understanding of the above and will follow up in office in 5 weeks for reevaluation  History of the Present Illness   Eloina Lewis  is a 32 y o  male approximately 8 days status post Left knee arthroscopy with partial medial meniscectomy performed on 1/25/2023  Today, patient reports he is doing well since surgery  He reports his knee swelling has moderately improved  He is attending outpatient PT and doing well  Review of Systems     Review of Systems   Constitutional: Negative for chills and fever  HENT: Negative for ear pain and sore throat  Eyes: Negative for pain and visual disturbance  Respiratory: Negative for cough and shortness of breath  Cardiovascular: Negative for chest pain and palpitations  Gastrointestinal: Negative for abdominal pain and vomiting  Genitourinary: Negative for dysuria and hematuria  Musculoskeletal: Negative for arthralgias and back pain  Skin: Negative for color change and rash  Neurological: Negative for seizures and syncope  All other systems reviewed and are negative  Physical Exam     /74   Pulse 88   Ht 6' 1" (1 854 m)   Wt 90 7 kg (200 lb)   BMI 26 39 kg/m²     Left  Knee  Surgical incisions well healing, without signs of infection  Nylon suture removed  Range of motion from 0 to 120 degrees  There is no post operative effusion  There is minimal tenderness over the medial joint line  The patient is able to perform a straight leg raise with 4+/5 quad strength  Calf soft, compressible and nontender   The patient is neurovascular intact distally  Data Review     I have personally reviewed pertinent films in PACS, and my interpretation follows      No new images    Social History     Tobacco Use   • Smoking status: Never   • Smokeless tobacco: Never   Vaping Use   • Vaping Use: Never used   Substance Use Topics   • Alcohol use: No   • Drug use: No           Procedures  None     Ted Crisostomo PA-C

## 2023-02-07 ENCOUNTER — OFFICE VISIT (OUTPATIENT)
Dept: PHYSICAL THERAPY | Facility: CLINIC | Age: 28
End: 2023-02-07

## 2023-02-07 DIAGNOSIS — S83.242D TEAR OF MEDIAL MENISCUS OF LEFT KNEE, CURRENT, UNSPECIFIED TEAR TYPE, SUBSEQUENT ENCOUNTER: Primary | ICD-10-CM

## 2023-02-07 NOTE — PROGRESS NOTES
Daily Note     Today's date: 2023  Patient name: Kennth Peabody  : 1995  MRN: [id number]  Referring provider: Jose L Calero PA-C  Dx:   Encounter Diagnosis     ICD-10-CM    1  Tear of medial meniscus of left knee, current, unspecified tear type, subsequent encounter  S83 242D           Start Time: 1515  Stop Time: 1555  Total time in clinic (min): 40 minutes    Subjective: Patient reports that squatting is really the only thing that is giving his knee any trouble  Patient states that exercises have been going well at home  Objective: See treatment diary below      Assessment: Patient demonstrates improving L knee strength this session  Patient completed today's session with increase in pain during mini squats  Recumbent bike performed in order to improve LE muscular endurance  Attempted LAQ with 5 pound ankle weight but patient demonstrated poor quad contraction and some knee discomfort  Decreased weight to 3 pounds- resulted in good quad contraction and no knee pain  Neuromuscular control is limited as patient is unable to perform lateral heel tap  Future sessions should continue to progress strengthening exercises as able  Tolerated treatment well  Patient would benefit from continued PT        Plan: Continue per plan of care        Precautions: hx of cancer      Manuals                                                                Neuro Re-Ed             SLS ball toss  x20 tosses           Y balance  2x5           Heel tap  unable           Lunge on bosu                                                    Ther Ex             Quad set HEP x20 3"           SLR HEP 3x10  #1           TB side steps  4 laps gtb           SL hip ABD  2x10  #1           LAQ  3x10  #3           [place] TKE  #15  x20           ADD iso bridge  x30           Bike  5 min           Ther Activity             Mini squat HEP 2x10           STS  x10 5" ecc           TRX squat             Ball squat  2x10 Stairs             Box jump                          Gait Training                                       Modalities

## 2023-02-09 ENCOUNTER — OFFICE VISIT (OUTPATIENT)
Dept: PHYSICAL THERAPY | Facility: CLINIC | Age: 28
End: 2023-02-09

## 2023-02-09 DIAGNOSIS — S83.242D TEAR OF MEDIAL MENISCUS OF LEFT KNEE, CURRENT, UNSPECIFIED TEAR TYPE, SUBSEQUENT ENCOUNTER: Primary | ICD-10-CM

## 2023-02-09 NOTE — PROGRESS NOTES
Daily Note     Today's date: 2023  Patient name: Kennth Peabody  : 1995  MRN: [id number]  Referring provider: Jose L Calero PA-C  Dx:   Encounter Diagnosis     ICD-10-CM    1  Tear of medial meniscus of left knee, current, unspecified tear type, subsequent encounter  S83 242D           Start Time: 1430  Stop Time: 1516  Total time in clinic (min): 46 minutes    Subjective: Patient reports that his L knee is feeling good  No pain or swelling after last session  Patient does note that he still has some limp during ambulation  Objective: See treatment diary below      Assessment: Patient demonstrates improving L knee strength and neuromuscular control this session  Patient completed today's session without pain  Recumbent bike performed in order to improve LE muscular endurance  Strength is steadily improving as expected following menisectomy  Neuromuscular control remains the most limited with frequent resets required with SLS and Y balance exercises  Used mirror in order to provide visual feedback for patient to perform equal weight bearing during mini squat exercise  Future sessions should continue to progress strengthening and balance exercises as able  Tolerated treatment well  Patient would benefit from continued PT        Plan: Continue per plan of care        Precautions: hx of cancer      Manuals                                                               Neuro Re-Ed             SLS ball toss  x20 tosses x20 tosses w foam          Y balance  2x5 3x5          Heel tap  unable           Lunge on bosu                                                    Ther Ex             Quad set HEP x20 3" x20 3"          SLR HEP 3x10  #1 3x10  #2          TB side steps  4 laps gtb 4 laps  gtb          SL hip ABD  2x10  #1 3x10  #1          LAQ  3x10  #3 3x10  #3          [place] TKE  #15  x20 #16  x30          ADD iso bridge  x30 x30 ylw          Bike  5 min 5 min          Ther Activity             Mini squat HEP 2x10 2x10 w mirror          STS  x10 5" ecc x15 5"  ecc          TRX squat   3x10          Ball squat  2x10           Stairs             Box jump                          Gait Training                                       Modalities

## 2023-02-14 ENCOUNTER — OFFICE VISIT (OUTPATIENT)
Dept: PHYSICAL THERAPY | Facility: CLINIC | Age: 28
End: 2023-02-14

## 2023-02-14 DIAGNOSIS — S83.242D TEAR OF MEDIAL MENISCUS OF LEFT KNEE, CURRENT, UNSPECIFIED TEAR TYPE, SUBSEQUENT ENCOUNTER: Primary | ICD-10-CM

## 2023-02-14 NOTE — PROGRESS NOTES
Daily Note     Today's date: 2023  Patient name: Rissa Hunt  : 1995  MRN: [id number]  Referring provider: Roney Cohn PA-C  Dx:   Encounter Diagnosis     ICD-10-CM    1  Tear of medial meniscus of left knee, current, unspecified tear type, subsequent encounter  S83 242D           Start Time: 1430  Stop Time: 1514  Total time in clinic (min): 44 minutes    Subjective: Patient reports that he feels like he is getting better  Patient states that squatting is improving for him and that's his only complaint at this point      Objective: See treatment diary below      Assessment: Patient demonstrates improving L knee strength this session  Patient completed today's session without pain  Recumbent bike performed in order to improve LE muscular endurance  Quad strength is improving with patient now able to tolerate 3# weight this session  Eccentric control is slowly improving with patient now able to perform lateral heel taps with compensation- unable to perform at all last visit  Future sessions should continue to progress strengthening and neuromuscular control exercises as able  Tolerated treatment well  Patient would benefit from continued PT        Plan: Continue per plan of care        Precautions: hx of cancer      Manuals                                                              Neuro Re-Ed             SLS ball toss  x20 tosses x20 tosses w foam 2x15 w foam         Y balance  2x5 3x5 3x5         Heel tap  unable  3x5 w compensation         Lunge on bosu                                                    Ther Ex             Quad set HEP x20 3" x20 3"          SLR HEP 3x10  #1 3x10  #2 3x10  #2         TB side steps  4 laps gtb 4 laps  gtb          SL hip ABD  2x10  #1 3x10  #1 3x10  #2         LAQ  3x10  #3 3x10  #3 3x10  #5         [place] TKE  #15  x20 #16  x30 #15  x30         ADD iso bridge  x30 x30 ylw          Leg press    #105  3x10         Bike  5 min 5 min 4 min         Ther Activity             Mini squat HEP 2x10 2x10 w mirror 2x10 w mirror         STS  x10 5" ecc x15 5"  ecc x15 5" ecc         TRX squat   3x10 2x10         Ball squat  2x10           Stairs             Box jump             Agility ladder    Fwd, lateral x4 ea         Gait Training                                       Modalities

## 2023-02-17 ENCOUNTER — OFFICE VISIT (OUTPATIENT)
Dept: PHYSICAL THERAPY | Facility: CLINIC | Age: 28
End: 2023-02-17

## 2023-02-17 DIAGNOSIS — S83.242D TEAR OF MEDIAL MENISCUS OF LEFT KNEE, CURRENT, UNSPECIFIED TEAR TYPE, SUBSEQUENT ENCOUNTER: Primary | ICD-10-CM

## 2023-02-17 NOTE — PROGRESS NOTES
Daily Note     Today's date: 2023  Patient name: Jeff Martinez  : 1995  MRN: [id number]  Referring provider: Sami Liu PA-C  Dx:   Encounter Diagnosis     ICD-10-CM    1  Tear of medial meniscus of left knee, current, unspecified tear type, subsequent encounter  S83 242D           Start Time: 1331  Stop Time: 1416  Total time in clinic (min): 45 minutes    Subjective: Patient reports no difficulty with squatting since last session  Patient states that he feels like he is ready to get back into some more sport specific movements  Objective: See treatment diary below      Assessment: Patient demonstrates improving L quad strength and neuromuscular control this session  Patient completed today's session without pain  Treadmill walking performed in order to improve LE muscular endurance and normalize gait pattern  No limp observed with treadmill walking or ambulation throughout session  Improved speed and symmetry noted with agility ladder drills this session and patient expressed feeling more explosive  Pogo jumps and double leg broad jumps added to begin restore LE power  Future sessions should continue to progress strengthening and balance exercises as able  Tolerated treatment well  Patient would benefit from continued PT        Plan: Continue per plan of care        Precautions: hx of cancer      Manuals                                                             Neuro Re-Ed             SLS ball toss  x20 tosses x20 tosses w foam 2x15 w foam 2x15 w foam        Y balance  2x5 3x5 3x5 3x5        Heel tap  unable  3x5 w compensation         Lunge on bosu                                                    Ther Ex             Quad set HEP x20 3" x20 3"          SLR HEP 3x10  #1 3x10  #2 3x10  #2 3x10  #5        TB side steps  4 laps gtb 4 laps  gtb          SL hip ABD  2x10  #1 3x10  #1 3x10  #2         LAQ  3x10  #3 3x10  #3 3x10  #5 3x10  #8        [place] TKE #15  x20 #16  x30 #15  x30 #17  x30        ADD iso bridge  x30 x30 ylw          Leg press    #105  3x10 #135  3x10        Treadmill walking     5 min        Bike  5 min 5 min 4 min         Ther Activity             Mini squat HEP 2x10 2x10 w mirror 2x10 w mirror 3x10 w mirror        STS  x10 5" ecc x15 5"  ecc x15 5" ecc         TRX squat   3x10 2x10 3x10        Ball squat  2x10           Stairs             Box jump             Pogo jumps     4x15        DL broad jump     4 laps        Agility ladder    Fwd, lateral x4 ea Fwd, lateral x4 ea        Gait Training                                       Modalities

## 2023-02-20 ENCOUNTER — APPOINTMENT (OUTPATIENT)
Dept: PHYSICAL THERAPY | Facility: CLINIC | Age: 28
End: 2023-02-20

## 2023-02-22 ENCOUNTER — OFFICE VISIT (OUTPATIENT)
Dept: PHYSICAL THERAPY | Facility: CLINIC | Age: 28
End: 2023-02-22

## 2023-02-22 DIAGNOSIS — S83.242D TEAR OF MEDIAL MENISCUS OF LEFT KNEE, CURRENT, UNSPECIFIED TEAR TYPE, SUBSEQUENT ENCOUNTER: Primary | ICD-10-CM

## 2023-02-22 NOTE — PROGRESS NOTES
Daily Note     Today's date: 2023  Patient name: Jessica Levin  : 1995  MRN: [id number]  Referring provider: Yola Thayer PA-C  Dx:   Encounter Diagnosis     ICD-10-CM    1  Tear of medial meniscus of left knee, current, unspecified tear type, subsequent encounter  S83 242D           Start Time: 0942  Stop Time: 1032  Total time in clinic (min): 50 minutes    Subjective: Patient reports that his knee held up well after adding some jumping last session  Objective: See treatment diary below      Assessment: Patient demonstrates improving L knee strength and neuromuscular control this session  Patient completed today's session without pain  Elliptical performed in order to improve LE muscular endurance  Double leg jumping onto 6 inch step today and patient demonstrates improving LE power  Attempted eccentric lowering to chair on 1 leg but patient demonstrates poor control- single leg lower to 20" box improved control  Future sessions should continue to progress strengthening and plyometric exercises as able  Tolerated treatment well  Patient would benefit from continued PT        Plan: Continue per plan of care        Precautions: hx of cancer      Manuals                                                            Neuro Re-Ed             SLS ball toss  x20 tosses x20 tosses w foam 2x15 w foam 2x15 w foam 2x15  w foam       Y balance  2x5 3x5 3x5 3x5 3x5       Heel tap  unable  3x5 w compensation  3x10 w compensation       Lunge on bosu                                                    Ther Ex             Quad set HEP x20 3" x20 3"          SLR HEP 3x10  #1 3x10  #2 3x10  #2 3x10  #5 3x10  #5       TB side steps  4 laps gtb 4 laps  gtb          SL hip ABD  2x10  #1 3x10  #1 3x10  #2         LAQ  3x10  #3 3x10  #3 3x10  #5 3x10  #8 3x10  #8       [place] TKE  #15  x20 #16  x30 #15  x30 #17  x30 #19  x30       ADD iso bridge  x30 x30 ylw          Leg press #105  3x10 #135  3x10 #155  3x10       Elliptical      5 min       Treadmill walking     5 min        Bike  5 min 5 min 4 min         Ther Activity             Mini squat HEP 2x10 2x10 w mirror 2x10 w mirror 3x10 w mirror        STS  x10 5" ecc x15 5"  ecc x15 5" ecc  SL  2x10  20" box       TRX squat   3x10 2x10 3x10        Ball squat  2x10    2x10       Stairs             Box jump      2x10  6 in       Pogo jumps     4x15 4x15       DL broad jump     4 laps 4 laps       Agility ladder    Fwd, lateral x4 ea Fwd, lateral x4 ea Fwd, lateral x4 ea       Gait Training                                       Modalities

## 2023-02-24 ENCOUNTER — OFFICE VISIT (OUTPATIENT)
Dept: PHYSICAL THERAPY | Facility: CLINIC | Age: 28
End: 2023-02-24

## 2023-02-24 DIAGNOSIS — S83.242D TEAR OF MEDIAL MENISCUS OF LEFT KNEE, CURRENT, UNSPECIFIED TEAR TYPE, SUBSEQUENT ENCOUNTER: Primary | ICD-10-CM

## 2023-02-24 NOTE — PROGRESS NOTES
Daily Note     Today's date: 2023  Patient name: Opal Barahona  : 1995  MRN: [id number]  Referring provider: Leela Guerrero PA-C  Dx:   Encounter Diagnosis     ICD-10-CM    1  Tear of medial meniscus of left knee, current, unspecified tear type, subsequent encounter  S83 242D           Start Time: 1327  Stop Time: 1420  Total time in clinic (min): 53 minutes    Subjective: Patient reports that he is feeling good today and had no trouble after last session  Patient states that he feels ready to start running again  Post-session patient states that his explosiveness is coming back  Objective: See treatment diary below      Assessment: Patient demonstrates improving L knee strength and LE power this session  Patient completed today's session without pain  Treadmill walking performed in order to improve LE muscular endurance  Focused on plyometric exercises today with addition of T runs and speed skaters  Most difficulty noted with deceleration from sprinting with T run  Told patient to bring shoes to run in next session  Future sessions should continue to progress strengthening and plyometric exercises as able  Tolerated treatment well  Patient would benefit from continued PT        Plan: Continue per plan of care        Precautions: hx of cancer      Manuals                                                           Neuro Re-Ed             SLS ball toss  x20 tosses x20 tosses w foam 2x15 w foam 2x15 w foam 2x15  w foam 2x15 w foam      Y balance  2x5 3x5 3x5 3x5 3x5 3x5      Heel tap  unable  3x5 w compensation  3x10 w compensation 2x10      Lunge on bosu                                                    Ther Ex             Quad set HEP x20 3" x20 3"          SLR HEP 3x10  #1 3x10  #2 3x10  #2 3x10  #5 3x10  #5 3x10  #5      TB side steps  4 laps gtb 4 laps  gtb          SL hip ABD  2x10  #1 3x10  #1 3x10  #2         LAQ  3x10  #3 3x10  #3 3x10  #5 3x10  #8 3x10  #8 3x10  #8      [place] TKE  #15  x20 #16  x30 #15  x30 #17  x30 #19  x30 #20  x30      ADD iso bridge  x30 x30 ylw          Leg press    #105  3x10 #135  3x10 #155  3x10       Elliptical      5 min       Treadmill walking     5 min        Bike  5 min 5 min 4 min   5 min      Ther Activity             Mini squat HEP 2x10 2x10 w mirror 2x10 w mirror 3x10 w mirror        STS  x10 5" ecc x15 5"  ecc x15 5" ecc  SL  2x10  20" box SL  2x10  24" box      TRX squat   3x10 2x10 3x10        Ball squat  2x10    2x10 3x10      Stairs             Box jump      2x10  6 in       Pogo jumps     4x15 4x15 4x15      DL broad jump     4 laps 4 laps 4 laps      Agility ladder    Fwd, lateral x4 ea Fwd, lateral x4 ea Fwd, lateral x4 ea Fwd, lateral x4 ea      T run       x5      Speed skaters       3x30"      Gait Training                                       Modalities

## 2023-02-28 ENCOUNTER — APPOINTMENT (OUTPATIENT)
Dept: PHYSICAL THERAPY | Facility: CLINIC | Age: 28
End: 2023-02-28

## 2023-03-03 ENCOUNTER — EVALUATION (OUTPATIENT)
Dept: PHYSICAL THERAPY | Facility: CLINIC | Age: 28
End: 2023-03-03

## 2023-03-03 DIAGNOSIS — S83.242D TEAR OF MEDIAL MENISCUS OF LEFT KNEE, CURRENT, UNSPECIFIED TEAR TYPE, SUBSEQUENT ENCOUNTER: Primary | ICD-10-CM

## 2023-03-03 NOTE — PROGRESS NOTES
PT Re-Evaluation     Today's date: 3/3/2023  Patient name: Rissa Hunt  : 1995  MRN: [id number]  Referring provider: Roney Cohn PA-C  Dx:   Encounter Diagnosis     ICD-10-CM    1  Tear of medial meniscus of left knee, current, unspecified tear type, subsequent encounter  S83 242D           Start Time: 1334  Stop Time: 1419  Total time in clinic (min): 45 minutes    Assessment  Assessment details: Patient is a 32year old male who has been attending OP PT s/p L partial menisectomy  The surgery was performed on 23  During PT, patient has made significant improvements in strength and neuromuscular control  LLE strength is now equal to RLE  Patient ambulates with normal gait pattern, is able to negotiate stairs without limitation, and jump without difficulty  Only remaining difficulty is physical deconditioning with running and other plyometric activities  Patient will continue to benefit from skilled PT services for 2-3 weeks to transition to HEP as he returns to work  Impairments: activity intolerance  Understanding of Dx/Px/POC: good   Prognosis: good    Goals  ST  Patient will be independent with HEP in 3 weeks -met  2  Patient will decrease self reported pain by 2 points in 3 weeks -met  LT  Patient will be able to ambulate with normal gait pattern in 6 weeks -met  2  Patient will be able to stand for >30 minutes without limitation in 6 weeks -met  3  Patient will be able to negotiate stairs without limitation in 6 weeks -met  4  Patient will be able to return to the gym without limitation in 6 weeks -met  5   Patient will be able to return to work without limitation in 6 weeks -not met    Plan  Patient would benefit from: skilled physical therapy  Planned modality interventions: cryotherapy and thermotherapy: hydrocollator packs  Planned therapy interventions: home exercise program, graded exercise, graded activity, functional ROM exercises, therapeutic exercise, therapeutic activities, stretching, strengthening, patient education, neuromuscular re-education, balance/weight bearing training, balance and manual therapy  Frequency: 3x week (2-3x/week)  Duration in visits: 6  Duration in weeks: 3  Plan of Care beginning date: 3/3/2023  Plan of Care expiration date: 3/24/2023  Treatment plan discussed with: patient        Subjective Evaluation    History of Present Illness  Date of surgery: 2023  Mechanism of injury: Patient reports to OP PT s/p L knee partial menisectomy on 23  Patient states that one day he woke up and noticed that his L knee was swollen and very painful  RITA unknown  Patient reports that he has some trouble walking and lifting heavy objects  Patient enjoys working out and wants to get back to that  3/3- Patient reports that he feels about 88% better since starting therapy  Patient states that running is really the only thing that gives him trouble at this point  Pain  Current pain ratin  At best pain ratin  At worst pain ratin  Aggravating factors: running    Social Support  Steps to enter house: yes  2  Stairs in house: yes   12  Lives with: parents    Employment status: working  Patient Goals  Patient goals for therapy: increased strength, independence with ADLs/IADLs, return to sport/leisure activities and return to work  Patient goal: basketball, workout        Objective     Tenderness     Additional Tenderness Details  Tender just over incision sites    Neurological Testing     Sensation     Knee   Left Knee   Intact: light touch    Right Knee   Intact: light touch     Active Range of Motion   Left Knee   Flexion: [facility]    Mobility   Patellar Mobility:   Left Knee   WFL: medial, lateral, superior and inferior       Patellar Static Positioning   Left Knee: WFL  Right Knee: [facility]    Strength/Myotome Testing     Left Knee   Flexion: 5 (pain)  Extension: 5 (pain)    Right Knee   Flexion: 5  Extension: 5    Ambulation     Ambulation: Level Surfaces   Ambulation without assistive device: independent    Additional Level Surfaces Ambulation Details  Normal gait pattern             Precautions: hx of cancer    Manuals 1/30 2/7 2/9 2/14 2/17 2/22 2/24 3/3                                                         Neuro Re-Ed             SLS ball toss  x20 tosses x20 tosses w foam 2x15 w foam 2x15 w foam 2x15  w foam 2x15 w foam      Y balance  2x5 3x5 3x5 3x5 3x5 3x5 2x5     Heel tap  unable  3x5 w compensation  3x10 w compensation 2x10 3x10     Lunge on bosu                                                    Ther Ex             Quad set HEP x20 3" x20 3"          SLR HEP 3x10  #1 3x10  #2 3x10  #2 3x10  #5 3x10  #5 3x10  #5      TB side steps  4 laps gtb 4 laps  gtb          SL hip ABD  2x10  #1 3x10  #1 3x10  #2         LAQ  3x10  #3 3x10  #3 3x10  #5 3x10  #8 3x10  #8 3x10  #8 3x10  #10     Kimberly TKE  #15  x20 #16  x30 #15  x30 #17  x30 #19  x30 #20  x30      ADD iso bridge  x30 x30 ylw          Leg press    #105  3x10 #135  3x10 #155  3x10       Elliptical      5 min       Treadmill walking     5 min   Running 2'     Bike  5 min 5 min 4 min   5 min      PT re eval        GS     Ther Activity             Mini squat HEP 2x10 2x10 w mirror 2x10 w mirror 3x10 w mirror        STS  x10 5" ecc x15 5"  ecc x15 5" ecc  SL  2x10  20" box SL  2x10  24" box SL  2x10  24"  box     TRX squat   3x10 2x10 3x10        Ball squat  2x10    2x10 3x10      Stairs             Box jump      2x10  6 in       Pogo jumps     4x15 4x15 4x15 4x15     DL broad jump     4 laps 4 laps 4 laps      Agility ladder    Fwd, lateral x4 ea Fwd, lateral x4 ea Fwd, lateral x4 ea Fwd, lateral x4 ea Fwd,  Lateral  x4 ea     T run       x5 x5     Speed skaters       3x30" 3x30"     Gait Training                                       Modalities

## 2023-03-06 ENCOUNTER — OFFICE VISIT (OUTPATIENT)
Dept: OBGYN CLINIC | Facility: CLINIC | Age: 28
End: 2023-03-06

## 2023-03-06 VITALS
WEIGHT: 207.8 LBS | DIASTOLIC BLOOD PRESSURE: 84 MMHG | HEIGHT: 73 IN | HEART RATE: 68 BPM | SYSTOLIC BLOOD PRESSURE: 131 MMHG | BODY MASS INDEX: 27.54 KG/M2

## 2023-03-06 DIAGNOSIS — Z98.890 S/P LEFT KNEE ARTHROSCOPY: Primary | ICD-10-CM

## 2023-03-06 NOTE — PROGRESS NOTES
Patient Name:  Kelly Chiang  MRN:  [id number]    Assessment & Plan     1  S/P left knee arthroscopy        32 y o  male approximately 6 week s/p Left knee arthroscopic partial medial menisectomy performed on 01/25/2023  Overall, patient doing very well and patient happy with outcome of surgery  At this time, patient may complete scheduled PT sessions and transition to home exercises  He may start with activities as tolerated and continued jogging  Patient verbalized understanding of the above and will follow up in office in 6 weeks for reevaluation of Left knee  History of the Present Illness   Harry Newberry  is a 32 y o  male approximately 6 week s/p Left knee arthroscopic partial medial menisectomy performed on 01/25/2023  Today, patient reports he is doing very well since surgery  He has been attending outpatient PT and has 4 more sessions scheduled  He even reports he was running recently without complications  Review of Systems     Review of Systems   Constitutional: Negative for chills and fever  HENT: Negative for ear pain and sore throat  Eyes: Negative for pain and visual disturbance  Respiratory: Negative for cough and shortness of breath  Cardiovascular: Negative for chest pain and palpitations  Gastrointestinal: Negative for abdominal pain and vomiting  Genitourinary: Negative for dysuria and hematuria  Musculoskeletal: Negative for arthralgias and back pain  Skin: Negative for color change and rash  Neurological: Negative for seizures and syncope  All other systems reviewed and are negative  Physical Exam     /84   Pulse 68   Ht 6' 1" (1 854 m)   Wt 94 3 kg (207 lb 12 8 oz)   BMI 27 42 kg/m²     Left Knee  Surgical incisions well healed, without signs of infection  Range of motion from 0 to 130 degrees without crepitus  There is no effusion  There is no tenderness over the medial or lateral joint lines      The patient is able to perform a straight leg raise with 4+/5 quad strength  Calf soft, compressible and nontender   The patient is neurovascular intact distally  Data Review     I have personally reviewed pertinent films in PACS, and my interpretation follows      No new images    Social History     Tobacco Use   • Smoking status: Never   • Smokeless tobacco: Never   Vaping Use   • Vaping Use: Never used   Substance Use Topics   • Alcohol use: No   • Drug use: No           Procedures  None     Sergio Aguilar PA-C

## 2023-03-07 ENCOUNTER — OFFICE VISIT (OUTPATIENT)
Dept: PHYSICAL THERAPY | Facility: CLINIC | Age: 28
End: 2023-03-07

## 2023-03-07 DIAGNOSIS — S83.242D TEAR OF MEDIAL MENISCUS OF LEFT KNEE, CURRENT, UNSPECIFIED TEAR TYPE, SUBSEQUENT ENCOUNTER: Primary | ICD-10-CM

## 2023-03-07 NOTE — PROGRESS NOTES
Daily Note     Today's date: 3/7/2023  Patient name: Rissa Hunt  : 1995  MRN: [id number]  Referring provider: Roney Cohn PA-C  Dx:   Encounter Diagnosis     ICD-10-CM    1  Tear of medial meniscus of left knee, current, unspecified tear type, subsequent encounter  S83 242D           Start Time: 901  Stop Time: 952  Total time in clinic (min): 51 minutes    Subjective: Patient reports that he had no trouble after running on the treadmill for the first time last session  Patient states that he is feeling pretty good today  Objective: See treatment diary below      Assessment: Patient demonstrates improving L knee strength and neuromuscular control this session  Patient completed today's session without pain  Treadmill running performed in order to improve LE muscular endurance and weight bearing tolerance  Increased height of step for lateral heel taps and patient demonstrates improved eccentric control for lowering and neuromuscular control of L knee  Explosiveness continues to improve with decreasing stance time with ladder drills  Future sessions should continue to progress LE strengthening, power, and control exercises as able  Tolerated treatment well  Patient would benefit from continued PT        Plan: Continue per plan of care        Precautions: hx of cancer    Manuals 1/30 2/7 2/9 2/14 2/17 2/22 2/24 3/3 3/7                                                        Neuro Re-Ed             SLS ball toss  x20 tosses x20 tosses w foam 2x15 w foam 2x15 w foam 2x15  w foam 2x15 w foam  2x15  w foam    Y balance  2x5 3x5 3x5 3x5 3x5 3x5 2x5 3x5    Heel tap  unable  3x5 w compensation  3x10 w compensation 2x10 3x10 3x10  6 in    Lunge on bosu                                                    Ther Ex             Quad set HEP x20 3" x20 3"          SLR HEP 3x10  #1 3x10  #2 3x10  #2 3x10  #5 3x10  #5 3x10  #5  3x10  #5    TB side steps  4 laps gtb 4 laps  gtb          SL hip ABD 2x10  #1 3x10  #1 3x10  #2         LAQ  3x10  #3 3x10  #3 3x10  #5 3x10  #8 3x10  #8 3x10  #8 3x10  #10 3x10  #10    Kimberly TKE  #15  x20 #16  x30 #15  x30 #17  x30 #19  x30 #20  x30      ADD iso bridge  x30 x30 ylw          Leg press    #105  3x10 #135  3x10 #155  3x10   #155  3x10    Elliptical      5 min       Treadmill walking     5 min   Running 2' Running 3'    Bike  5 min 5 min 4 min   5 min      PT re eval        GS     Ther Activity             Mini squat HEP 2x10 2x10 w mirror 2x10 w mirror 3x10 w mirror        STS  x10 5" ecc x15 5"  ecc x15 5" ecc  SL  2x10  20" box SL  2x10  24" box SL  2x10  24"  box SL  2x10  24" box    TRX squat   3x10 2x10 3x10        Ball squat  2x10    2x10 3x10  3x10    Stairs             Box jump      2x10  6 in       Pogo jumps     4x15 4x15 4x15 4x15 4x15    DL broad jump     4 laps 4 laps 4 laps      Agility ladder    Fwd, lateral x4 ea Fwd, lateral x4 ea Fwd, lateral x4 ea Fwd, lateral x4 ea Fwd,  Lateral  x4 ea Fwd, lat, in out, 4x ea    T run       x5 x5     Speed skaters       3x30" 3x30" 3x30"    Gait Training                                       Modalities

## 2023-03-09 ENCOUNTER — APPOINTMENT (OUTPATIENT)
Dept: PHYSICAL THERAPY | Facility: CLINIC | Age: 28
End: 2023-03-09

## 2023-03-10 ENCOUNTER — OFFICE VISIT (OUTPATIENT)
Dept: PHYSICAL THERAPY | Facility: CLINIC | Age: 28
End: 2023-03-10

## 2023-03-10 DIAGNOSIS — S83.242D TEAR OF MEDIAL MENISCUS OF LEFT KNEE, CURRENT, UNSPECIFIED TEAR TYPE, SUBSEQUENT ENCOUNTER: Primary | ICD-10-CM

## 2023-03-10 NOTE — PROGRESS NOTES
Daily Note     Today's date: 3/10/2023  Patient name: Kenya Mcghee  : 1995  MRN: [id number]  Referring provider: Michael Bella PA-C  Dx:   Encounter Diagnosis     ICD-10-CM    1  Tear of medial meniscus of left knee, current, unspecified tear type, subsequent encounter  S83 242D           Start Time: 1420  Stop Time: 1505  Total time in clinic (min): 45 minutes    Subjective: Patient reports that L knee is feeling good and he is ready to return to basketball soon  Objective: See treatment diary below      Assessment: Patient demonstrates improving L LE strength and power this session  Patient completed today's session without pain  Treadmill running performed at increased speed this session in order to improve LE and aerobic endurance  Patient demonstrates great explosive power with DL broad jump with patient jumping >6 feet  Attempted SL sit to stand at 20" box this session  Patient is able to complete 3 good repetitions to failure  Patient on track for discharge next week  Future sessions should continue to progress plyometric exercises as able  Tolerated treatment well  Patient would benefit from continued PT        Plan: Continue per plan of care        Precautions: hx of cancer    Manuals 1/30 2/7 2/9 2/14 2/17 2/22 2/24 3/3 3/7 3/10                                                       Neuro Re-Ed             SLS ball toss  x20 tosses x20 tosses w foam 2x15 w foam 2x15 w foam 2x15  w foam 2x15 w foam  2x15  w foam 3x w foam and cognit   Y balance  2x5 3x5 3x5 3x5 3x5 3x5 2x5 3x5    Heel tap  unable  3x5 w compensation  3x10 w compensation 2x10 3x10 3x10  6 in 3x10  6 in   Lunge on bosu                                                    Ther Ex             Quad set HEP x20 3" x20 3"          SLR HEP 3x10  #1 3x10  #2 3x10  #2 3x10  #5 3x10  #5 3x10  #5  3x10  #5 3x10  #5   TB side steps  4 laps gtb 4 laps  gtb          SL hip ABD  2x10  #1 3x10  #1 3x10  #2         LAQ  3x10  #3 3x10  #3 3x10  #5 3x10  #8 3x10  #8 3x10  #8 3x10  #10 3x10  #10 3x10  #10  3" ecc   Louise TKE  #15  x20 #16  x30 #15  x30 #17  x30 #19  x30 #20  x30      ADD iso bridge  x30 x30 ylw          Leg press    #105  3x10 #135  3x10 #155  3x10   #155  3x10 #185  3x10   Elliptical      5 min       Treadmill walking     5 min   Running 2' Running 3' Running 3'   Bike  5 min 5 min 4 min   5 min      PT re eval        GS     Ther Activity             Mini squat HEP 2x10 2x10 w mirror 2x10 w mirror 3x10 w mirror        STS  x10 5" ecc x15 5"  ecc x15 5" ecc  SL  2x10  20" box SL  2x10  24" box SL  2x10  24"  box SL  2x10  24" box SL  2x10  24"  x3 20"   TRX squat   3x10 2x10 3x10        Ball squat  2x10    2x10 3x10  3x10    Stairs             Box jump      2x10  6 in       Pogo jumps     4x15 4x15 4x15 4x15 4x15 4x15   DL broad jump     4 laps 4 laps 4 laps   4 laps   Agility ladder    Fwd, lateral x4 ea Fwd, lateral x4 ea Fwd, lateral x4 ea Fwd, lateral x4 ea Fwd,  Lateral  x4 ea Fwd, lat, in out, 4x ea Fwd, lat, in out 4 x ea   T run       x5 x5  x5   Speed skaters       3x30" 3x30" 3x30" 3x30"   Gait Training                                       Modalities

## 2023-03-14 ENCOUNTER — OFFICE VISIT (OUTPATIENT)
Dept: PHYSICAL THERAPY | Facility: CLINIC | Age: 28
End: 2023-03-14

## 2023-03-14 DIAGNOSIS — S83.242D TEAR OF MEDIAL MENISCUS OF LEFT KNEE, CURRENT, UNSPECIFIED TEAR TYPE, SUBSEQUENT ENCOUNTER: Primary | ICD-10-CM

## 2023-03-14 NOTE — PROGRESS NOTES
Daily Note     Today's date: 3/14/2023  Patient name: Pati Overton  : 1995  MRN: [id number]  Referring provider: Radha Santos PA-C  Dx:   Encounter Diagnosis     ICD-10-CM    1  Tear of medial meniscus of left knee, current, unspecified tear type, subsequent encounter  S83 242D           Start Time: 1430  Stop Time: 1515  Total time in clinic (min): 45 minutes    Subjective: Patient reports no difficulty or pain with L knee since last visit  Objective: See treatment diary below      Assessment: Patient demonstrates improving L quad strength and power  Patient completed today's session without pain  Treadmill running performed in order to facilitate return to basketball  Patient feels ready to return to basketball, just hasn't had the chance to play yet  Patient demonstrates adequate strength, power, and change of direction ability  Slight L PF power deficits remain as patient has difficulty with single leg hopping this session  Future sessions should continue to progress neuromuscular control and plyometric exercises and transition to HEP for maintenance  Tolerated treatment well  Patient would benefit from continued PT        Plan: Continue per plan of care        Precautions: hx of cancer    Manuals 3/14 2/7 2/9 2/14 2/17 2/22 2/24 3/3 3/7 3/10                                                       Neuro Re-Ed             SLS ball toss  x20 tosses x20 tosses w foam 2x15 w foam 2x15 w foam 2x15  w foam 2x15 w foam  2x15  w foam 3x w foam and cognit   Y balance  2x5 3x5 3x5 3x5 3x5 3x5 2x5 3x5    Heel tap 3x10  6" unable  3x5 w compensation  3x10 w compensation 2x10 3x10 3x10  6 in 3x10  6 in   Lunge on bosu                                                    Ther Ex             Quad set  x20 3" x20 3"          SLR  3x10  #1 3x10  #2 3x10  #2 3x10  #5 3x10  #5 3x10  #5  3x10  #5 3x10  #5   TB side steps  4 laps gtb 4 laps  gtb          SL hip ABD  2x10  #1 3x10  #1 3x10  #2         LAQ 3x10  #10  3" ecc 3x10  #3 3x10  #3 3x10  #5 3x10  #8 3x10  #8 3x10  #8 3x10  #10 3x10  #10 3x10  #10  3" ecc   Kimberly TKE  #15  x20 #16  x30 #15  x30 #17  x30 #19  x30 #20  x30      ADD iso bridge  x30 x30 ylw          SL calf raise 3x10            Posterior tib calf raise 3x10  red            Leg press 3x10  #185   #105  3x10 #135  3x10 #155  3x10   #155  3x10 #185  3x10   Elliptical      5 min       Treadmill walking 5 min  4 1 mph    5 min   Running 2' Running 3' Running 3'   Bike  5 min 5 min 4 min   5 min      PT re eval        GS     Ther Activity             Mini squat  2x10 2x10 w mirror 2x10 w mirror 3x10 w mirror        STS 2x10  20" x10 5" ecc x15 5"  ecc x15 5" ecc  SL  2x10  20" box SL  2x10  24" box SL  2x10  24"  box SL  2x10  24" box SL  2x10  24"  x3 20"   TRX squat   3x10 2x10 3x10        Ball squat  2x10    2x10 3x10  3x10    Stairs             Box jump      2x10  6 in       Pogo jumps     4x15 4x15 4x15 4x15 4x15 4x15   DL broad jump     4 laps 4 laps 4 laps   4 laps   Agility ladder Fwd, lat, in out 4 x ea   Fwd, lateral x4 ea Fwd, lateral x4 ea Fwd, lateral x4 ea Fwd, lateral x4 ea Fwd,  Lateral  x4 ea Fwd, lat, in out, 4x ea Fwd, lat, in out 4 x ea   T run       x5 x5  x5   Speed skaters 3x30"      3x30" 3x30" 3x30" 3x30"   Gait Training                                       Modalities

## 2023-03-16 ENCOUNTER — APPOINTMENT (OUTPATIENT)
Dept: PHYSICAL THERAPY | Facility: CLINIC | Age: 28
End: 2023-03-16

## 2023-03-17 ENCOUNTER — OFFICE VISIT (OUTPATIENT)
Dept: PHYSICAL THERAPY | Facility: CLINIC | Age: 28
End: 2023-03-17

## 2023-03-17 DIAGNOSIS — S83.242D TEAR OF MEDIAL MENISCUS OF LEFT KNEE, CURRENT, UNSPECIFIED TEAR TYPE, SUBSEQUENT ENCOUNTER: Primary | ICD-10-CM

## 2023-03-17 NOTE — PROGRESS NOTES
PT Discharge    Today's date: 3/17/2023  Patient name: Janine Mcfadden  : 1995  MRN: [id number]  Referring provider: Kamar Barahona PA-C  Dx:   Encounter Diagnosis     ICD-10-CM    1  Tear of medial meniscus of left knee, current, unspecified tear type, subsequent encounter  S83 242D           Start Time: 1200  Stop Time: 1245  Total time in clinic (min): 45 minutes    Assessment  Assessment details: Patient is a 32year old male who has been attending OP PT s/p L partial menisectomy  The surgery was performed on 23  During PT, patient has made significant improvements in strength and neuromuscular control  LLE strength is now equal to RLE  Patient ambulates with normal gait pattern, is able to negotiate stairs without limitation, and jump without difficulty  Patient should be discharged to HEP for maintenance at this time  I encouraged patient to contact with with any questions or concerns in the future  Goals  ST  Patient will be independent with HEP in 3 weeks -met  2  Patient will decrease self reported pain by 2 points in 3 weeks -met  LT  Patient will be able to ambulate with normal gait pattern in 6 weeks -met  2  Patient will be able to stand for >30 minutes without limitation in 6 weeks -met  3  Patient will be able to negotiate stairs without limitation in 6 weeks -met  4  Patient will be able to return to the gym without limitation in 6 weeks -met  5  Patient will be able to return to work without limitation in 6 weeks -n/a    Plan  Plan details: D/c to HEP  Frequency: 2-3x/week  Treatment plan discussed with: patient        Subjective Evaluation    History of Present Illness  Date of surgery: 2023  Mechanism of injury: Patient reports to OP PT s/p L knee partial menisectomy on 23  Patient states that one day he woke up and noticed that his L knee was swollen and very painful  RITA unknown    Patient reports that he has some trouble walking and lifting heavy objects  Patient enjoys working out and wants to get back to that  3/3- Patient reports that he feels about 88% better since starting therapy  Patient states that running is really the only thing that gives him trouble at this point  Pain  Current pain ratin  At best pain ratin  At worst pain ratin  Aggravating factors: running    Social Support  Steps to enter house: yes  2  Stairs in house: yes   12  Lives with: parents    Employment status: working  Patient Goals  Patient goals for therapy: increased strength, independence with ADLs/IADLs, return to sport/leisure activities and return to work  Patient goal: basketball, workout        Objective     Tenderness     Additional Tenderness Details  Tender just over incision sites    Neurological Testing     Sensation     Knee   Left Knee   Intact: light touch    Right Knee   Intact: light touch     Active Range of Motion   Left Knee   Flexion: [place]    Mobility   Patellar Mobility:   Left Knee   WFL: medial, lateral, superior and inferior       Patellar Static Positioning   Left Knee: WFL  Right Knee: [place]    Strength/Myotome Testing     Left Knee   Flexion: 5 (pain)  Extension: 5 (pain)    Right Knee   Flexion: 5  Extension: 5    Ambulation     Ambulation: Level Surfaces   Ambulation without assistive device: independent    Additional Level Surfaces Ambulation Details  Normal gait pattern             Precautions: hx of cancer    Manuals 3/14 3/17 2/9 2/14 2/17 2/22 2/24 3/3 3/7 3/10                                                       Neuro Re-Ed             SLS ball toss   x20 tosses w foam 2x15 w foam 2x15 w foam 2x15  w foam 2x15 w foam  2x15  w foam 3x w foam and cognit   Y balance  3x5 3x5 3x5 3x5 3x5 3x5 2x5 3x5    Heel tap 3x10  6" 3x10  6"  3x5 w compensation  3x10 w compensation 2x10 3x10 3x10  6 in 3x10  6 in   Lunge on bosu                                                    Ther Ex             Quad set   x20 3"          SLR   3x10  #2 3x10  #2 3x10  #5 3x10  #5 3x10  #5  3x10  #5 3x10  #5   TB side steps   4 laps  gtb          SL hip ABD   3x10  #1 3x10  #2         LAQ 3x10  #10  3" ecc 3x10  #10  3" ecc 3x10  #3 3x10  #5 3x10  #8 3x10  #8 3x10  #8 3x10  #10 3x10  #10 3x10  #10  3" ecc   Kimberly TKE   #16  x30 #15  x30 #17  x30 #19  x30 #20  x30      ADD iso bridge   x30 ylw          SL calf raise 3x10            Posterior tib calf raise 3x10  red            Leg press 3x10  #185 3x10  #105  3x10 #135  3x10 #155  3x10   #155  3x10 #185  3x10   Elliptical      5 min       Treadmill walking 5 min  4 1 mph 5 min  4 mph   5 min   Running 2' Running 3' Running 3'   Bike   5 min 4 min   5 min      PT re eval        GS     Ther Activity             Mini squat   2x10 w mirror 2x10 w mirror 3x10 w mirror        STS 2x10  20" 2x10  20" x15 5"  ecc x15 5" ecc  SL  2x10  20" box SL  2x10  24" box SL  2x10  24"  box SL  2x10  24" box SL  2x10  24"  x3 20"   TRX squat   3x10 2x10 3x10        Ball squat      2x10 3x10  3x10    Stairs             Box jump      2x10  6 in       Pogo jumps  4x20   4x15 4x15 4x15 4x15 4x15 4x15   DL broad jump  4 laps   4 laps 4 laps 4 laps   4 laps   Agility ladder Fwd, lat, in out 4 x ea Fwd, lat, in out, 4x ea  Fwd, lateral x4 ea Fwd, lateral x4 ea Fwd, lateral x4 ea Fwd, lateral x4 ea Fwd,  Lateral  x4 ea Fwd, lat, in out, 4x ea Fwd, lat, in out 4 x ea   T run  x5     x5 x5  x5   Speed skaters 3x30" 3x30"     3x30" 3x30" 3x30" 3x30"   Gait Training                                       Modalities

## 2023-06-06 ENCOUNTER — OFFICE VISIT (OUTPATIENT)
Dept: INTERNAL MEDICINE CLINIC | Facility: CLINIC | Age: 28
End: 2023-06-06
Payer: COMMERCIAL

## 2023-06-06 VITALS
RESPIRATION RATE: 14 BRPM | SYSTOLIC BLOOD PRESSURE: 130 MMHG | DIASTOLIC BLOOD PRESSURE: 62 MMHG | OXYGEN SATURATION: 98 % | HEART RATE: 98 BPM | WEIGHT: 208 LBS | BODY MASS INDEX: 27.57 KG/M2 | HEIGHT: 73 IN

## 2023-06-06 DIAGNOSIS — N62 GYNECOMASTIA: Primary | ICD-10-CM

## 2023-06-06 DIAGNOSIS — C81.00: ICD-10-CM

## 2023-06-06 DIAGNOSIS — Z13.6 SCREENING FOR HEART DISEASE: ICD-10-CM

## 2023-06-06 PROCEDURE — 99214 OFFICE O/P EST MOD 30 MIN: CPT | Performed by: PHYSICIAN ASSISTANT

## 2023-06-06 NOTE — PROGRESS NOTES
"Assessment/Plan:   Patient Instructions   We will refer patient back to hematology/oncology to get appointment closer to home  Patient to do labs and I will discuss results with him when available  Will research other condition that we discussed and get back to you  Quality Measures: Patient is currently not overweight, he is very muscular throwing off the BMI  Depression Screening and Follow-up Plan: Patient was screened for depression during today's encounter  They screened negative with a PHQ-2 score of 0  Return if symptoms worsen or fail to improve  Diagnoses and all orders for this visit:    Gynecomastia    Stage IIA lymphocyte-predominant Hodgkin's disease ([facility] [address] )  -     CBC and differential; Future  -     Comprehensive metabolic panel; Future  -     Ambulatory Referral to Hematology / Oncology; Future    Screening for heart disease  -     Lipid panel; Future          Subjective:      Patient ID: Alonzo Bumpers  is a 25 y o  male  Follow-up, patient has not been seen in some time  Patient would like a referral to hematology/oncology locally as driving to the [place] is difficult for him  He is followed for stage IIa lymphocyte predominant Hodgkin's disease  Patient denies night sweats, weight loss, swollen glands  No fatigue  Gynecomastia: This has been a problem for quite a while  He is very self-conscious about it, has difficulty removing his shirt to go swimming etc   At times has him somewhat anxious and depressed  Would like to know what his options are for this        ALLERGIES:  Allergies   Allergen Reactions   • Penicillins Hives     As an infant - \"lungs collapsed\" per mom       CURRENT MEDICATIONS:    Current Outpatient Medications:   •  aspirin (ECOTRIN LOW STRENGTH) 81 mg EC tablet, Take 1 tablet (81 mg total) by mouth 2 (two) times a day, Disp: 28 tablet, Rfl: 0  •  ibuprofen (MOTRIN) 800 mg tablet, Take 1 tablet (800 mg total) by mouth every 8 (eight) " hours as needed for mild pain Take 1 tablet by mouth 3 times daily for 3 days, then take as needed  Take with food and water  Discontinue if stomach upset occurs  , Disp: 30 tablet, Rfl: 0  •  ondansetron (ZOFRAN) 4 mg tablet, Take 1 tablet (4 mg total) by mouth every 8 (eight) hours as needed for nausea or vomiting, Disp: 20 tablet, Rfl: 0    ACTIVE PROBLEM LIST:  Patient Active Problem List   Diagnosis   • Idiopathic thrombocytopenic purpura ([place] [address] )   • Stage IIA lymphocyte-predominant Hodgkin's disease ([place] [address] )   • Allergic rhinitis   • Breast lump   • Urinary urgency   • Dysphagia   • Constipation   • Rectal bleeding   • Blood in stool   • Gynecomastia   • Urethral straddle injury       PAST MEDICAL HISTORY:  Past Medical History:   Diagnosis Date   • Cancer ([place] [address] )     hodgkin's lymphoma   • ITP (idiopathic thrombocytopenic purpura)    • Lymphoma ([place] [address] )        PAST SURGICAL HISTORY:  Past Surgical History:   Procedure Laterality Date   • HERNIA REPAIR      umbilical - at 7 months of age   • [place] ARTHRS KNEE W/MENISCECTOMY MED&LAT W/SHAVING Left 1/25/2023    Procedure: LEFT knee arthroscopy, partial medial meniscectomy;  Surgeon: Alix Duran DO;  Location: [facility];  Service: Orthopedics   • [place] BX/EXC LYMPH NODE OPEN DEEP AXILLARY NODE Left 9/26/2016    Procedure: AXILLARY LYMPH NODE BIOPSY;  Surgeon: Terrance Gonzalez MD;  Location:  MAIN OR;  Service: Surgical Oncology   • SENTINEL LYMPH NODE BIOPSY Right        FAMILY HISTORY:  Family History   Problem Relation Age of Onset   • Breast cancer Maternal Aunt    • Breast cancer Maternal Grandmother    • Hodgkin's lymphoma Paternal Grandfather        SOCIAL HISTORY:  Social History     Socioeconomic History   • Marital status: Single     Spouse name: Not on file   • Number of children: Not on file   • Years of education: Not on file   • Highest education level: Not on file   Occupational History   • Not on file   Tobacco Use   • Smoking status: Never   • Smokeless "tobacco: Never   Vaping Use   • Vaping Use: Never used   Substance and Sexual Activity   • Alcohol use: No   • Drug use: No   • Sexual activity: Yes   Other Topics Concern   • Not on file   Social History Narrative   • Not on file     Social Determinants of Health     Financial Resource Strain: Not on file   Food Insecurity: Not on file   Transportation Needs: Not on file   Physical Activity: Not on file   Stress: Not on file   Social Connections: Not on file   Intimate Partner Violence: Not on file   Housing Stability: Not on file       Review of Systems   Constitutional: Negative for activity change, chills, fatigue and fever  HENT: Negative for congestion  Eyes: Negative for discharge  Respiratory: Negative for cough, chest tightness and shortness of breath  Cardiovascular: Negative for chest pain, palpitations and leg swelling  Gastrointestinal: Negative for abdominal pain  Endocrine: Negative for polydipsia, polyphagia and polyuria  Genitourinary: Negative for difficulty urinating  Musculoskeletal: Negative for arthralgias and myalgias  Skin: Negative for rash  Allergic/Immunologic: Negative for immunocompromised state  Neurological: Negative for dizziness, syncope, weakness, light-headedness and headaches  Hematological: Negative for adenopathy  Does not bruise/bleed easily  Psychiatric/Behavioral: Negative for dysphoric mood  The patient is not nervous/anxious  Objective:  Vitals:    06/06/23 1345   BP: 130/62   BP Location: Left arm   Patient Position: Sitting   Pulse: 98   Resp: 14   SpO2: 98%   Weight: 94 3 kg (208 lb)   Height: 6' 1\" (1 854 m)     Body mass index is 27 44 kg/m²  Physical Exam  Vitals and nursing note reviewed  Constitutional:       General: He is not in acute distress  Appearance: He is well-developed  HENT:      Head: Normocephalic and atraumatic  Eyes:      Extraocular Movements: Extraocular movements intact        Pupils: Pupils are " equal, round, and reactive to light  Neck:      Thyroid: No thyromegaly  Vascular: No carotid bruit or JVD  Cardiovascular:      Rate and Rhythm: Normal rate and regular rhythm  Heart sounds: Normal heart sounds  Pulmonary:      Effort: Pulmonary effort is normal  No respiratory distress  Breath sounds: Normal breath sounds  Comments: Moderately enlarged breasts bilaterally  Soft in nature without dominant nodule  Musculoskeletal:      Cervical back: Neck supple  Right lower leg: No edema  Left lower leg: No edema  Lymphadenopathy:      Cervical: No cervical adenopathy  Upper Body:      Left upper body: Axillary adenopathy present  Comments: Questionably enlarged left axillary lymph node  Skin:     General: Skin is warm and dry  Findings: No rash  Neurological:      General: No focal deficit present  Mental Status: He is alert and oriented to person, place, and time  Mental status is at baseline  Psychiatric:         Mood and Affect: Mood normal          Behavior: Behavior normal            RESULTS:    In chart    This note was created with voice recognition software  Phonic, grammatical and spelling errors may be present within the note as a result

## 2023-06-06 NOTE — PATIENT INSTRUCTIONS
We will refer patient back to hematology/oncology to get appointment closer to home  Patient to do labs and I will discuss results with him when available  Will research other condition that we discussed and get back to you

## 2023-06-07 ENCOUNTER — DOCUMENTATION (OUTPATIENT)
Dept: HEMATOLOGY ONCOLOGY | Facility: CLINIC | Age: 28
End: 2023-06-07

## 2023-06-07 NOTE — PROGRESS NOTES
Intake received, chart reviewed for need of external records  Patient noted to have a history of lymphocyte-predominant Hodgkin's disease    Per intake, patient was advised to have records faxed to [phone] for office review

## 2023-06-08 ENCOUNTER — APPOINTMENT (OUTPATIENT)
Dept: LAB | Facility: CLINIC | Age: 28
End: 2023-06-08
Payer: COMMERCIAL

## 2023-06-08 DIAGNOSIS — Z13.6 SCREENING FOR HEART DISEASE: ICD-10-CM

## 2023-06-08 DIAGNOSIS — C81.00: ICD-10-CM

## 2023-06-08 LAB
ALBUMIN SERPL BCP-MCNC: 4 G/DL (ref 3.5–5)
ALP SERPL-CCNC: 46 U/L (ref 46–116)
ALT SERPL W P-5'-P-CCNC: 28 U/L (ref 12–78)
ANION GAP SERPL CALCULATED.3IONS-SCNC: 1 MMOL/L (ref 4–13)
AST SERPL W P-5'-P-CCNC: 28 U/L (ref 5–45)
BASOPHILS # BLD AUTO: 0.07 THOUSANDS/ÂΜL (ref 0–0.1)
BASOPHILS NFR BLD AUTO: 1 % (ref 0–1)
BILIRUB SERPL-MCNC: 0.5 MG/DL (ref 0.2–1)
BUN SERPL-MCNC: 17 MG/DL (ref 5–25)
CALCIUM SERPL-MCNC: 8.9 MG/DL (ref 8.3–10.1)
CHLORIDE SERPL-SCNC: 112 MMOL/L (ref 96–108)
CHOLEST SERPL-MCNC: 145 MG/DL
CO2 SERPL-SCNC: 26 MMOL/L (ref 21–32)
CREAT SERPL-MCNC: 1.14 MG/DL (ref 0.6–1.3)
EOSINOPHIL # BLD AUTO: 0.12 THOUSAND/ÂΜL (ref 0–0.61)
EOSINOPHIL NFR BLD AUTO: 2 % (ref 0–6)
ERYTHROCYTE [DISTWIDTH] IN BLOOD BY AUTOMATED COUNT: 12.6 % (ref 11.6–15.1)
GFR SERPL CREATININE-BSD FRML MDRD: 85 ML/MIN/1.73SQ M
GLUCOSE P FAST SERPL-MCNC: 74 MG/DL (ref 65–99)
HCT VFR BLD AUTO: 41.8 % (ref 36.5–49.3)
HDLC SERPL-MCNC: 62 MG/DL
HGB BLD-MCNC: 13.8 G/DL (ref 12–17)
IMM GRANULOCYTES # BLD AUTO: 0.01 THOUSAND/UL (ref 0–0.2)
IMM GRANULOCYTES NFR BLD AUTO: 0 % (ref 0–2)
LDLC SERPL CALC-MCNC: 76 MG/DL (ref 0–100)
LYMPHOCYTES # BLD AUTO: 2.26 THOUSANDS/ÂΜL (ref 0.6–4.47)
LYMPHOCYTES NFR BLD AUTO: 34 % (ref 14–44)
MCH RBC QN AUTO: 30.3 PG (ref 26.8–34.3)
MCHC RBC AUTO-ENTMCNC: 33 G/DL (ref 31.4–37.4)
MCV RBC AUTO: 92 FL (ref 82–98)
MONOCYTES # BLD AUTO: 0.5 THOUSAND/ÂΜL (ref 0.17–1.22)
MONOCYTES NFR BLD AUTO: 8 % (ref 4–12)
NEUTROPHILS # BLD AUTO: 3.68 THOUSANDS/ÂΜL (ref 1.85–7.62)
NEUTS SEG NFR BLD AUTO: 55 % (ref 43–75)
NONHDLC SERPL-MCNC: 83 MG/DL
NRBC BLD AUTO-RTO: 0 /100 WBCS
PLATELET # BLD AUTO: 181 THOUSANDS/UL (ref 149–390)
PMV BLD AUTO: 11.3 FL (ref 8.9–12.7)
POTASSIUM SERPL-SCNC: 3.9 MMOL/L (ref 3.5–5.3)
PROT SERPL-MCNC: 7 G/DL (ref 6.4–8.4)
RBC # BLD AUTO: 4.55 MILLION/UL (ref 3.88–5.62)
SODIUM SERPL-SCNC: 139 MMOL/L (ref 135–147)
TRIGL SERPL-MCNC: 35 MG/DL
WBC # BLD AUTO: 6.64 THOUSAND/UL (ref 4.31–10.16)

## 2023-06-08 PROCEDURE — 85025 COMPLETE CBC W/AUTO DIFF WBC: CPT

## 2023-06-08 PROCEDURE — 36415 COLL VENOUS BLD VENIPUNCTURE: CPT

## 2023-06-08 PROCEDURE — 80061 LIPID PANEL: CPT

## 2023-06-08 PROCEDURE — 80053 COMPREHEN METABOLIC PANEL: CPT

## 2023-06-19 ENCOUNTER — CONSULT (OUTPATIENT)
Dept: HEMATOLOGY ONCOLOGY | Facility: CLINIC | Age: 28
End: 2023-06-19
Payer: COMMERCIAL

## 2023-06-19 VITALS
OXYGEN SATURATION: 99 % | HEIGHT: 73 IN | TEMPERATURE: 98.3 F | HEART RATE: 67 BPM | BODY MASS INDEX: 27.04 KG/M2 | WEIGHT: 204 LBS | SYSTOLIC BLOOD PRESSURE: 146 MMHG | DIASTOLIC BLOOD PRESSURE: 68 MMHG

## 2023-06-19 DIAGNOSIS — C81.00: ICD-10-CM

## 2023-06-19 PROCEDURE — 99205 OFFICE O/P NEW HI 60 MIN: CPT | Performed by: INTERNAL MEDICINE

## 2023-06-19 NOTE — PROGRESS NOTES
Vesta Billalley   1995  [facility] HEMATOLOGY [facility] [address] [phone]    Chief Complaint   Patient presents with   • Consult           Oncology History    No history exists  History of Present Illness:  February 21, 2012 patient was found to have nodular lymphocyte predominant Hodgkin's lymphoma, bilateral axillary nodes  He was treated with Rituxan weekly x4 followed by every 2 month Rituxan ending March 2014 September 2016 patient had recurrence of axillary lymphadenopathy  Biopsy showed no convincing evidence of recurrent disease  Patient was restarted on Rituxan every 3 months February 2017- November 2018  He was lost to follow-up thereafter  Most recent imaging August 2018 CT abdomen pelvis showed no evidence of lymphadenopathy  Chest was planned but not accomplished due to insurance discontinuation  October 2022 patient had arthrocentesis  Flow cytometry proved negative  More recently patient saw PCP  CBC and differential were normal   CMP showed chloride 112, otherwise normal     Review of Systems   Constitutional: Negative for chills and fever  HENT: Negative for nosebleeds  Eyes: Negative for discharge  Respiratory: Negative for cough and shortness of breath  Cardiovascular: Negative for chest pain  Gastrointestinal: Negative for abdominal pain, constipation and diarrhea  Endocrine: Negative for polydipsia  Genitourinary: Negative for hematuria  Musculoskeletal: Negative for arthralgias  Skin: Negative for color change  Allergic/Immunologic: Negative for immunocompromised state  Neurological: Negative for dizziness and headaches  Hematological: Negative for adenopathy  Psychiatric/Behavioral: Negative for agitation         Patient Active Problem List   Diagnosis   • Idiopathic thrombocytopenic purpura ([facility] [address] )   • Stage IIA lymphocyte-predominant Hodgkin's disease ([facility] [address] )   • Allergic rhinitis   • Breast lump   • Urinary urgency   • Dysphagia   • Constipation   • Rectal bleeding   • Blood in stool   • Gynecomastia   • Urethral straddle injury     Past Medical History:   Diagnosis Date   • Cancer ([facility])     hodgkin's lymphoma   • ITP (idiopathic thrombocytopenic purpura)    • Lymphoma ([facility])      Past Surgical History:   Procedure Laterality Date   • HERNIA REPAIR      umbilical - at 7 months of age   • [place] ARTHRS KNEE W/MENISCECTOMY MED&LAT W/SHAVING Left 1/25/2023    Procedure: LEFT knee arthroscopy, partial medial meniscectomy;  Surgeon: Jaqueline Ace DO;  Location: [facility];  Service: Orthopedics   • [place] BX/EXC LYMPH NODE OPEN DEEP AXILLARY NODE Left 9/26/2016    Procedure: AXILLARY LYMPH NODE BIOPSY;  Surgeon: Breana Loving MD;  Location:  MAIN OR;  Service: Surgical Oncology   • SENTINEL LYMPH NODE BIOPSY Right      Family History   Problem Relation Age of Onset   • Breast cancer Maternal Aunt    • Breast cancer Maternal Grandmother    • Hodgkin's lymphoma Paternal Grandfather      Social History     Socioeconomic History   • Marital status: Single     Spouse name: Not on file   • Number of children: Not on file   • Years of education: Not on file   • Highest education level: Not on file   Occupational History   • Not on file   Tobacco Use   • Smoking status: Never   • Smokeless tobacco: Never   Vaping Use   • Vaping Use: Never used   Substance and Sexual Activity   • Alcohol use: No   • Drug use: No   • Sexual activity: Yes   Other Topics Concern   • Not on file   Social History Narrative   • Not on file     Social Determinants of Health     Financial Resource Strain: Not on file   Food Insecurity: Not on file   Transportation Needs: Not on file   Physical Activity: Not on file   Stress: Not on file   Social Connections: Not on file   Intimate Partner Violence: Not on file   Housing Stability: Not on file       Current Outpatient Medications:   •  aspirin ([address]) "81 mg EC tablet, Take 1 tablet (81 mg total) by mouth 2 (two) times a day (Patient not taking: Reported on 6/19/2023), Disp: 28 tablet, Rfl: 0  •  ibuprofen (MOTRIN) 800 mg tablet, Take 1 tablet (800 mg total) by mouth every 8 (eight) hours as needed for mild pain Take 1 tablet by mouth 3 times daily for 3 days, then take as needed  Take with food and water  Discontinue if stomach upset occurs  (Patient not taking: Reported on 6/19/2023), Disp: 30 tablet, Rfl: 0  •  ondansetron (ZOFRAN) 4 mg tablet, Take 1 tablet (4 mg total) by mouth every 8 (eight) hours as needed for nausea or vomiting (Patient not taking: Reported on 6/19/2023), Disp: 20 tablet, Rfl: 0  Allergies   Allergen Reactions   • Penicillins Hives     As an infant - \"lungs collapsed\" per mom     Vitals:    06/19/23 0952   BP: 146/68   Pulse: 67   Temp: 98 3 °F (36 8 °C)   SpO2: 99%       Physical Exam  Constitutional:       Appearance: He is well-developed  HENT:      Head: Normocephalic and atraumatic  Eyes:      Pupils: Pupils are equal, round, and reactive to light  Cardiovascular:      Rate and Rhythm: Normal rate and regular rhythm  Heart sounds: No murmur heard  Pulmonary:      Breath sounds: Normal breath sounds  No wheezing or rales  Abdominal:      Palpations: Abdomen is soft  Tenderness: There is no abdominal tenderness  Musculoskeletal:         General: No tenderness  Normal range of motion  Cervical back: Neck supple  Lymphadenopathy:      Cervical: No cervical adenopathy  Upper Body:      Right upper body: Axillary adenopathy ( 1 5 cm nontender right axillary node ) present  Skin:     Findings: No erythema or rash  Neurological:      Mental Status: He is alert and oriented to person, place, and time  Cranial Nerves: No cranial nerve deficit  Deep Tendon Reflexes: Reflexes are normal and symmetric     Psychiatric:         Behavior: Behavior normal            Labs:  CBC, Coags, BMP, Mg, Phos " Imaging  No results found  I reviewed the above laboratory and imaging data  Discussion/Summary: In summary, this is a 25-year-old male with a history of nodular lymphocyte predominant Hodgkin's lymphoma treated with Rituxan, most recently in November 2018  Clinically he is well  He generally functions without restriction  Denies B symptoms  Physical exam shows 1 5 cm nontender right axillary node  He is not aware of this  Timeframe uncertain  Given his history, CT chest ab pelvis seems reasonable  If this only demonstrates right axillary node, physical exam follow-up would be reasonable to establish trajectory and determine next steps  I reviewed the above with the patient  He voiced understanding and agreement

## 2023-08-02 ENCOUNTER — TELEPHONE (OUTPATIENT)
Dept: HEMATOLOGY ONCOLOGY | Facility: CLINIC | Age: 28
End: 2023-08-02

## 2023-08-02 NOTE — TELEPHONE ENCOUNTER
I called [facility] regarding an appointment that they have scheduled with Dr. Evlyn Dakins scheduled on 9/27/23     I left a voicemail explaining to patient that this appointment will need to be rescheduled due to a change in the providers schedule. Patient was advised to call [facility] to reschedule. A [employer] message (if applicable) has been sent to patient relaying the above information and advising patient to call [facility] and reschedule their appointment.

## 2023-08-22 ENCOUNTER — TELEPHONE (OUTPATIENT)
Dept: HEMATOLOGY ONCOLOGY | Facility: CLINIC | Age: 28
End: 2023-08-22

## 2023-08-22 NOTE — TELEPHONE ENCOUNTER
I called [facility] regarding an appointment that they have scheduled with Dr. Sukhjinder Hutchinson scheduled on 9/27/23      I left a voicemail explaining to patient that this appointment will need to be rescheduled due to a change in the providers schedule. Patient was advised to call [facility] to reschedule.      A [employer] message (if applicable) has been sent to patient relaying the above information and advising patient to call [facility] and reschedule their appointment.

## 2023-08-24 ENCOUNTER — TELEPHONE (OUTPATIENT)
Dept: HEMATOLOGY ONCOLOGY | Facility: CLINIC | Age: 28
End: 2023-08-24

## 2023-08-24 NOTE — TELEPHONE ENCOUNTER
Appointment Change  Cancel, Reschedule, Change to Virtual      Who are you speaking with? self   If it is not the patient, are they listed on an active communication consent form? self   Which provider is the appointment scheduled with? Dana   When is the appointment scheduled? Please list date and time 9/27   At which location is the appointment scheduled to take place? [facility]   Was the appointment rescheduled or changed from an in person visit to a virtual visit? If so, please list the details of the change. Yes, 10/19 9:20am [facility]   What is the reason for the appointment change? Provider moved   Was [facility] transport scheduled for this visit? no   Does [facility] transport need to be scheduled for the new visit (if applicable) no   Does the patient need an infusion appointment rescheduled? no   Does the patient have an infusion appointment scheduled? If so, when? no   Is the patient undergoing chemotherapy? no   Was the no-show policy reviewed for appointments being changed with less then 24 hours of notice?  yes

## 2023-08-24 NOTE — TELEPHONE ENCOUNTER
Appointment Change  Cancel, Reschedule, Change to Virtual      Who are you speaking with? LVM to patient    If it is not the patient, are they listed on an active communication consent form? N/A   Which provider is the appointment scheduled with? Dr. Riky Gomes   When is the appointment scheduled? Please list date and time  09/27/23 8:40AM   At which location is the appointment scheduled to take place? [facility]   Was the appointment rescheduled or changed from an in person visit to a virtual visit? If so, please list the details of the change. No, 3 attempts made to reschedule patient were unsuccessful. LVM to patient, sent [employer] message, and mailed letter to home address. What is the reason for the appointment change? Provider location change   Was [facility] transport scheduled for this visit? No   Does [facility] transport need to be scheduled for the new visit (if applicable) N/A   Does the patient need an infusion appointment rescheduled? No   Does the patient have an infusion appointment scheduled? If so, when? No   Is the patient undergoing chemotherapy? No   Was the no-show policy reviewed for appointments being changed with less then 24 hours of notice?  N/A

## 2023-08-24 NOTE — LETTER
[facility]. [address] [place] [address] [address]  [address] [facility], [address] [facility]  ROBYN, [address] [facility]    Ph: [phone]  Fx: [phone]        Dave Vitaliy,     We have made multiple attempts to contact you regarding your upcoming appointment with Dr. Migdalia Quinones scheduled 09/27/23. We have been unable to reach you by phone. Due to a change in the providers schedule, your appointment has been cancelled. At your earliest convenience, please contact the [address] [place] [address] at [phone] and we would be happy to assist you in rescheduling your appointment. Thank you,  Corinne C. [facility]

## 2023-09-07 ENCOUNTER — TELEPHONE (OUTPATIENT)
Dept: HEMATOLOGY ONCOLOGY | Facility: CLINIC | Age: 28
End: 2023-09-07

## 2023-09-07 DIAGNOSIS — C81.00: Primary | ICD-10-CM

## 2023-09-07 NOTE — TELEPHONE ENCOUNTER
Spoke to Pawel today to inform him that his labs were ordered and he should go 1 week before his appointment.

## 2023-09-20 ENCOUNTER — HOSPITAL ENCOUNTER (OUTPATIENT)
Dept: CT IMAGING | Facility: HOSPITAL | Age: 28
Discharge: HOME | End: 2023-09-20
Attending: INTERNAL MEDICINE
Payer: COMMERCIAL

## 2023-09-20 DIAGNOSIS — C81.00: ICD-10-CM

## 2023-09-20 PROCEDURE — G1004 CDSM NDSC: HCPCS

## 2023-09-20 PROCEDURE — 71260 CT THORAX DX C+: CPT

## 2023-09-20 PROCEDURE — 74177 CT ABD & PELVIS W/CONTRAST: CPT

## 2023-09-20 RX ADMIN — IOHEXOL 100 ML: 350 INJECTION, SOLUTION INTRAVENOUS at 07:57

## 2023-10-17 ENCOUNTER — APPOINTMENT (OUTPATIENT)
Dept: LAB | Facility: CLINIC | Age: 28
End: 2023-10-17
Payer: COMMERCIAL

## 2023-10-17 DIAGNOSIS — C81.00: ICD-10-CM

## 2023-10-17 LAB
BUN SERPL-MCNC: 15 MG/DL (ref 5–25)
CREAT SERPL-MCNC: 1.02 MG/DL (ref 0.6–1.3)
GFR SERPL CREATININE-BSD FRML MDRD: 98 ML/MIN/1.73SQ M

## 2023-10-17 PROCEDURE — 82565 ASSAY OF CREATININE: CPT

## 2023-10-17 PROCEDURE — 84520 ASSAY OF UREA NITROGEN: CPT

## 2023-10-17 PROCEDURE — 36415 COLL VENOUS BLD VENIPUNCTURE: CPT

## 2023-10-19 ENCOUNTER — OFFICE VISIT (OUTPATIENT)
Dept: HEMATOLOGY ONCOLOGY | Facility: CLINIC | Age: 28
End: 2023-10-19
Payer: COMMERCIAL

## 2023-10-19 VITALS
WEIGHT: 200.4 LBS | RESPIRATION RATE: 17 BRPM | HEART RATE: 76 BPM | OXYGEN SATURATION: 99 % | TEMPERATURE: 98.2 F | HEIGHT: 73 IN | SYSTOLIC BLOOD PRESSURE: 120 MMHG | DIASTOLIC BLOOD PRESSURE: 82 MMHG | BODY MASS INDEX: 26.56 KG/M2

## 2023-10-19 DIAGNOSIS — C81.00: Primary | ICD-10-CM

## 2023-10-19 PROCEDURE — 99214 OFFICE O/P EST MOD 30 MIN: CPT | Performed by: INTERNAL MEDICINE

## 2023-10-19 NOTE — PROGRESS NOTES
[facility] HEMATOLOGY ONCOLOGY SPECIALISTS [facility] [id number]  [address] Jr.,1995, [id number]  10/19/23    Discussion:   In summary, this is a 25-year-old male with a history of nodular lymphocyte predominant Hodgkin's lymphoma as outlined. Clinically, he is doing well. Functions without restriction. Most recent blood work in June showed normal CBC and CMP. Recent CT chest ab pelvis shows bilateral axillary nodes, 1.7 cm. Minimally increased compared to prior study of 5 years earlier. We considered more aggressive versus less aggressive approach in his case. More aggressive would involve therapy aimed at disease control, survival benefit but would carry potential for toxicity. Less aggressive could include observation. This would have less potential for toxicity but allows the possibility of sooner than later disease progression. Fortunately, his disease has been following a fairly flat trajectory. To minimize potential for toxicity observation is elected. We will see him back in 6 months for physical exam and blood work. Reimaging in 1 year. I discussed the above with the patient. The patient  voiced understanding and agreement.  ______________________________________________________________________    Chief Complaint   Patient presents with    Follow-up       HPI:  Oncology History   Stage IIA lymphocyte-predominant Hodgkin's disease ([address] [facility])   2/21/2012 Initial Diagnosis    February 21, 2012 patient was found to have nodular lymphocyte predominant Hodgkin's lymphoma, bilateral axillary nodes. He was treated with Rituxan weekly x4 followed by every 2 month Rituxan ending March 2014. September 2016 patient had recurrence of axillary lymphadenopathy. Biopsy showed no convincing evidence of recurrent disease. Patient was restarted on Rituxan every 3 months February 2017- November 2018.   He was lost to follow-up thereafter. Most recent imaging August 2018 CT abdomen pelvis showed no evidence of lymphadenopathy. Chest was planned but not accomplished due to insurance discontinuation. Interval History: Clinically stable. ECOG-  0 - Asymptomatic    Review of Systems   Constitutional:  Negative for chills and fever. HENT:  Negative for nosebleeds. Eyes:  Negative for discharge. Respiratory:  Negative for cough and shortness of breath. Cardiovascular:  Negative for chest pain. Gastrointestinal:  Negative for abdominal pain, constipation and diarrhea. Endocrine: Negative for polydipsia. Genitourinary:  Negative for hematuria. Musculoskeletal:  Negative for arthralgias. Skin:  Negative for color change. Allergic/Immunologic: Negative for immunocompromised state. Neurological:  Negative for dizziness and headaches. Hematological:  Negative for adenopathy. Psychiatric/Behavioral:  Negative for agitation. Past Medical History:   Diagnosis Date    Cancer ([facility])     hodgkin's lymphoma    ITP (idiopathic thrombocytopenic purpura)     Lymphoma (HCC)      Patient Active Problem List   Diagnosis    Idiopathic thrombocytopenic purpura ([facility])    Stage IIA lymphocyte-predominant Hodgkin's disease ([facility])    Allergic rhinitis    Breast lump    Urinary urgency    Dysphagia    Constipation    Rectal bleeding    Blood in stool    Gynecomastia    Urethral straddle injury       Current Outpatient Medications:     aspirin (ECOTRIN LOW STRENGTH) 81 mg EC tablet, Take 1 tablet (81 mg total) by mouth 2 (two) times a day (Patient not taking: Reported on 6/19/2023), Disp: 28 tablet, Rfl: 0    ibuprofen (MOTRIN) 800 mg tablet, Take 1 tablet (800 mg total) by mouth every 8 (eight) hours as needed for mild pain Take 1 tablet by mouth 3 times daily for 3 days, then take as needed. Take with food and water. Discontinue if stomach upset occurs.  (Patient not taking: Reported on 6/19/2023), Disp: 30 tablet, Rfl: 0 ondansetron (ZOFRAN) 4 mg tablet, Take 1 tablet (4 mg total) by mouth every 8 (eight) hours as needed for nausea or vomiting (Patient not taking: Reported on 6/19/2023), Disp: 20 tablet, Rfl: 0  Allergies   Allergen Reactions    Penicillins Hives     As an infant - "lungs collapsed" per mom     Past Surgical History:   Procedure Laterality Date    HERNIA REPAIR      umbilical - at 10months of age    CA ARTHRS KNEE W/MENISCECTOMY MED&LAT W/SHAVING Left 1/25/2023    Procedure: LEFT knee arthroscopy, partial medial meniscectomy;  Surgeon: Kiera Avendaño DO;  Location: [facility];  Service: Orthopedics    CA BX/EXC LYMPH NODE OPEN DEEP AXILLARY NODE Left 9/26/2016    Procedure: AXILLARY LYMPH NODE BIOPSY;  Surgeon: Katya Mtz MD;  Location: [facility];  Service: Surgical Oncology    SENTINEL LYMPH NODE BIOPSY Right      Social History     Objective:  Vitals:    10/19/23 0942   BP: 120/82   BP Location: Left arm   Patient Position: Sitting   Cuff Size: Adult   Pulse: 76   Resp: 17   Temp: 98.2 °F (36.8 °C)   SpO2: 99%   Weight: 90.9 kg (200 lb 6.4 oz)   Height: 6' 1" (1.854 m)     Physical Exam  Constitutional:       Appearance: He is well-developed. HENT:      Head: Normocephalic and atraumatic. Mouth/Throat:      Mouth: Mucous membranes are moist.   Eyes:      Pupils: Pupils are equal, round, and reactive to light. Cardiovascular:      Rate and Rhythm: Normal rate and regular rhythm. Heart sounds: No murmur heard. Pulmonary:      Breath sounds: Normal breath sounds. No wheezing or rales. Abdominal:      Palpations: Abdomen is soft. Tenderness: There is no abdominal tenderness. Musculoskeletal:         General: No tenderness. Normal range of motion. Cervical back: Neck supple. Lymphadenopathy:      Cervical: No cervical adenopathy. Skin:     Findings: No erythema or rash. Neurological:      Mental Status: He is alert and oriented to person, place, and time.       Cranial Nerves: No cranial nerve deficit. Deep Tendon Reflexes: Reflexes are normal and symmetric. Psychiatric:         Behavior: Behavior normal.           Labs: I personally reviewed the labs and imaging pertinent to this patient care.

## 2024-03-18 ENCOUNTER — OFFICE VISIT (OUTPATIENT)
Dept: INTERNAL MEDICINE CLINIC | Facility: CLINIC | Age: 29
End: 2024-03-18
Payer: COMMERCIAL

## 2024-03-18 VITALS
SYSTOLIC BLOOD PRESSURE: 124 MMHG | OXYGEN SATURATION: 98 % | HEIGHT: 73 IN | BODY MASS INDEX: 27.57 KG/M2 | DIASTOLIC BLOOD PRESSURE: 82 MMHG | HEART RATE: 67 BPM | WEIGHT: 208 LBS

## 2024-03-18 DIAGNOSIS — H61.22 IMPACTED CERUMEN OF LEFT EAR: Primary | ICD-10-CM

## 2024-03-18 PROCEDURE — 99213 OFFICE O/P EST LOW 20 MIN: CPT | Performed by: INTERNAL MEDICINE

## 2024-03-18 NOTE — PROGRESS NOTES
"Assessment/Plan:     Unfortunately, I could not irrigate his left ear, did provider a script for debrox, he was instructed to use it x 5 days, then return  so we can try again. Consider ENT.     Quality Measures:       Depression Screening and Follow-up Plan: Clincally patient does not have depression. No treatment is required.        Return for Next scheduled follow up.    No problem-specific Assessment & Plan notes found for this encounter.       Diagnoses and all orders for this visit:    Impacted cerumen of left ear  -     carbamide peroxide (DEBROX) 6.5 % otic solution; Administer 5 drops into the left ear 2 (two) times a day for 5 days          Subjective:      Patient ID: Jaime Schofield Jr. is a 28 y.o. male.    Here with left ear pain; fullness.      ALLERGIES:  Allergies   Allergen Reactions    Penicillins Hives     As an infant - \"lungs collapsed\" per mom       CURRENT MEDICATIONS:    Current Outpatient Medications:     carbamide peroxide (DEBROX) 6.5 % otic solution, Administer 5 drops into the left ear 2 (two) times a day for 5 days, Disp: 15 mL, Rfl: 0    ACTIVE PROBLEM LIST:  Patient Active Problem List   Diagnosis    Idiopathic thrombocytopenic purpura (HCC)    Stage IIA lymphocyte-predominant Hodgkin's disease (HCC)    Allergic rhinitis    Breast lump    Urinary urgency    Dysphagia    Constipation    Rectal bleeding    Blood in stool    Gynecomastia    Urethral straddle injury       PAST MEDICAL HISTORY:  Past Medical History:   Diagnosis Date    Cancer (HCC)     hodgkin's lymphoma    ITP (idiopathic thrombocytopenic purpura)     Lymphoma (HCC)        PAST SURGICAL HISTORY:  Past Surgical History:   Procedure Laterality Date    HERNIA REPAIR      umbilical - at 6 months of age    NC ARTHRS KNEE W/MENISCECTOMY MED&LAT W/SHAVING Left 1/25/2023    Procedure: LEFT knee arthroscopy, partial medial meniscectomy;  Surgeon: Kike Watt DO;  Location: [facility];  Service: Orthopedics    NC BX/EXC " LYMPH NODE OPEN DEEP AXILLARY NODE Left 9/26/2016    Procedure: AXILLARY LYMPH NODE BIOPSY;  Surgeon: Fabian Calloway MD;  Location: [facility];  Service: Surgical Oncology    SENTINEL LYMPH NODE BIOPSY Right        FAMILY HISTORY:  Family History   Problem Relation Age of Onset    Breast cancer Maternal Aunt     Breast cancer Maternal Grandmother     Hodgkin's lymphoma Paternal Grandfather        SOCIAL HISTORY:  Social History     Socioeconomic History    Marital status: Single     Spouse name: Not on file    Number of children: Not on file    Years of education: Not on file    Highest education level: Not on file   Occupational History    Not on file   Tobacco Use    Smoking status: Never    Smokeless tobacco: Never   Vaping Use    Vaping status: Never Used   Substance and Sexual Activity    Alcohol use: No    Drug use: No    Sexual activity: Yes   Other Topics Concern    Not on file   Social History Narrative    Not on file     Social Determinants of Health     Financial Resource Strain: Not on file   Food Insecurity: Not on file   Transportation Needs: Not on file   Physical Activity: Not on file   Stress: Not on file   Social Connections: Not on file   Intimate Partner Violence: Not on file   Housing Stability: Not on file       Review of Systems   Constitutional:  Negative for chills and fever.   HENT:  Negative for ear pain and sore throat.    Eyes:  Negative for pain and visual disturbance.   Respiratory:  Negative for cough and shortness of breath.    Cardiovascular:  Negative for chest pain and palpitations.   Gastrointestinal:  Negative for abdominal pain and vomiting.   Genitourinary:  Negative for dysuria and hematuria.   Musculoskeletal:  Negative for arthralgias and back pain.   Skin:  Negative for color change and rash.   Neurological:  Negative for seizures and syncope.   All other systems reviewed and are negative.        Objective:  Vitals:    03/18/24 1023   BP: 124/82   BP Location: Left arm  "  Patient Position: Sitting   Cuff Size: Standard   Pulse: 67   SpO2: 98%   Weight: 94.3 kg (208 lb)   Height: 6' 1\" (1.854 m)     Body mass index is 27.44 kg/m².     Physical Exam  Vitals and nursing note reviewed.   Constitutional:       Appearance: Normal appearance.   HENT:      Head: Normocephalic and atraumatic.      Left Ear: There is impacted cerumen.   Cardiovascular:      Rate and Rhythm: Normal rate.   Pulmonary:      Effort: Pulmonary effort is normal.   Musculoskeletal:         General: Normal range of motion.      Cervical back: Normal range of motion.   Skin:     General: Skin is warm and dry.   Neurological:      General: No focal deficit present.      Mental Status: He is alert and oriented to person, place, and time. Mental status is at baseline.   Psychiatric:         Mood and Affect: Mood normal.           RESULTS:  Cholesterol   Date/Time Value Ref Range Status   06/08/2023 10:32  See Comment mg/dL Final     Comment:     Cholesterol:         Pediatric <18 Years        Desirable          <170 mg/dL      Borderline High    170-199 mg/dL      High               >=200 mg/dL        Adult >=18 Years            Desirable        <200 mg/dL      Borderline High  200-239 mg/dL      High             >239 mg/dL       Triglycerides   Date/Time Value Ref Range Status   06/08/2023 10:32 AM 35 See Comment mg/dL Final     Comment:     Triglyceride:     0-9Y            <75mg/dL     10Y-17Y         <90 mg/dL       >=18Y     Normal          <150 mg/dL     Borderline High 150-199 mg/dL     High            200-499 mg/dL        Very High       >499 mg/dL    Specimen collection should occur prior to N-Acetylcysteine or Metamizole administration due to the potential for falsely depressed results.     HDL, Direct   Date/Time Value Ref Range Status   06/08/2023 10:32 AM 62 >=40 mg/dL Final     Comment:     Specimen collection should occur prior to Metamizole administration due to the potential for falsley depressed " results.     LDL Calculated   Date/Time Value Ref Range Status   06/08/2023 10:32 AM 76 0 - 100 mg/dL Final     Comment:     LDL Cholesterol:     Optimal           <100 mg/dl     Near Optimal      100-129 mg/dl     Above Optimal       Borderline High 130-159 mg/dl       High            160-189 mg/dl       Very High       >189 mg/dl         This screening LDL is a calculated result.   It does not have the accuracy of the Direct Measured LDL in the monitoring of patients with hyperlipidemia and/or statin therapy.   Direct Measure LDL (IKC357) must be ordered separately in these patients.     Hemoglobin   Date/Time Value Ref Range Status   06/08/2023 10:32 AM 13.8 12.0 - 17.0 g/dL Final     Hematocrit   Date/Time Value Ref Range Status   06/08/2023 10:32 AM 41.8 36.5 - 49.3 % Final     Platelets   Date/Time Value Ref Range Status   06/08/2023 10:32  149 - 390 Thousands/uL Final     Sodium   Date/Time Value Ref Range Status   06/08/2023 10:32  135 - 147 mmol/L Final   09/26/2022 09:06  136 - 145 mmol/L Final     BUN   Date/Time Value Ref Range Status   10/17/2023 09:52 AM 15 5 - 25 mg/dL Final   09/26/2022 09:06 PM 17 7 - 25 mg/dL Final     Creatinine   Date/Time Value Ref Range Status   10/17/2023 09:52 AM 1.02 0.60 - 1.30 mg/dL Final     Comment:     Standardized to IDMS reference method   09/26/2022 09:06 PM 1.07 0.70 - 1.30 mg/dL Final      In chart    This note was created with voice recognition software.  Phonic, grammatical and spelling errors may be present within the note as a result.

## 2024-03-19 NOTE — PLAN OF CARE
MEDICARE WELLNESS VISIT + NOTE    CHIEF COMPLAINT:  Rich Sargent presents for his Subsequent Annual Medicare Wellness Visit.   His additional complaints or concerns are addressed below.     Patient Care Team:  Afshin Kapadia DO as PCP - General (Family Medicine)  Henry Sharp MD as Ophthalmology (Ophthalmology)  Piero Luna MD as Urologist (Urology)  Mitch Rice MD (Oncology)  Dudley Mccullough, PHD as Referring Provider (Psychologist - Clinical)  Soraida Vargas MD (Nurse Practitioner)        Patient Active Problem List   Diagnosis    Benign enlargement of prostate    Carpal tunnel syndrome    Diabetes type 2, controlled (CMD)    Erectile dysfunction    Hyperlipidemia    Essential hypertension    Overweight (BMI 25.0-29.9)    Dental caries    Plantar fascial fibromatosis    Acquired deformity of right foot    Seasonal allergies    Major depressive disorder, recurrent, moderate (CMS/HCC)    Bipolar disorder (CMD)    Anxiety    Diarrhea    Prostate cancer (CMD)    History of colonic polyps    Diverticular disease of colon    Polyp of colon         Past Medical History:   Diagnosis Date    Acquired pes valgus     Age related cataract     Anxiety 11/15/2021    Benign enlargement of prostate 12/30/2015    Bipolar disorder (CMD) 11/15/2021    Diabetes type 2, controlled (CMD) 10/29/2018    Officially diabetic 11/2018    Essential hypertension 03/25/2015    Hyperlipidemia 01/26/2016    Major depressive disorder, recurrent, moderate (CMS/HCC) 10/21/2020    Plantar fascial fibromatosis     Prostate cancer (CMD)     Syphilis 06/27/2014    Overview:  3/1/12 rpr 1:32 rx 3wks 2.4mu pcn              Past Surgical History:   Procedure Laterality Date    Prostate biopsy  03/16/2022         Social History     Tobacco Use    Smoking status: Never    Smokeless tobacco: Never   Vaping Use    Vaping Use: never used   Substance Use Topics    Alcohol use: No    Drug use: Never     Family History   Problem Relation Age  Problem: Knowledge Deficit  Goal: Patient/family/caregiver demonstrates understanding of disease process, treatment plan, medications, and discharge instructions  Complete learning assessment and assess knowledge base    Interventions:  - Provide teaching at level of understanding  - Provide teaching via preferred learning methods   Outcome: Progressing of Onset    Diabetes Mother     Kidney disease Mother     Heart disease Father     Patient is unaware of any medical problems Brother         estranged    Stroke Half-Brother     Cancer, Lung Half-Brother 60    Depression Half-Sister     Other Half-Sister         anorexia    Patient is unaware of any medical problems Half-Sister     Myocardial Infarction Half-Sister 65    Patient is unaware of any medical problems Half-Sister          Current Outpatient Medications   Medication Sig Dispense Refill    brimonidine (ALPHAGAN) 0.2 % ophthalmic solution [None received]      timolol (TIMOPTIC) 0.5 % ophthalmic solution INSTILL 1 DROP IN LEFT EYE TWICE DAILY      tadalafil (CIALIS) 5 MG tablet Take 1 tablet by mouth daily. 90 tablet 1    lisinopril (ZESTRIL) 10 MG tablet TAKE 1 TABLET BY MOUTH DAILY 90 tablet 3    emtricitabine-tenofovir ALAFENAMIDE (DESCOVY) 200-25 MG per tablet Take 1 tablet by mouth daily. 90 tablet 0    haloperidol (HALDOL) 10 MG tablet Take 1 tablet by mouth in the morning and 1 tablet in the evening. 180 tablet 0    divalproex (DEPAKOTE) 500 MG delayed release EC tablet Take 2 tablets by mouth at bedtime. 180 tablet 0    buPROPion XL (WELLBUTRIN XL) 300 MG 24 hr tablet Take 1 tablet by mouth daily. 90 tablet 0    chlorhexidine gluconate (PERIDEX) 0.12 % solution Swish and spit 15mL by mouth in the morning and in the evening 473 mL 1    atorvastatin (LIPITOR) 20 MG tablet TAKE 1 TABLET BY MOUTH AT BEDTIME 90 tablet 3    latanoprost (XALATAN) 0.005 % ophthalmic solution       blood glucose (ONE TOUCH ULTRA TEST) test strip Test blood sugar **One* times daily as directed. Diagnosis: Type 2 DM. Meter: One Touch Ultra 50 strip 4    ONETOUCH DELICA LANCETS 33G Misc Test once daily 100 each 1    Blood Glucose Monitoring Suppl (ONE TOUCH ULTRA 2) w/Device Kit Use kit as directed 1 kit 1     No current facility-administered medications for this visit.        The following items on the Medicare Health Risk  Assessment were found to be positive  1.) Do you have an Advance directive, living will, or power of  for health care document that contains your wishes for end of life care?: No     2.) Would you like additional information on advance directives?: Yes     4.) During the past 4 weeks, what was the hardest physical activity you could do for at least 2 minutes?: Light     7b.) Do you feel unsteady when standing or walking?: Yes     7c.) Do you worry about falling?: Yes     8.) During the past 4 weeks, has your physical and emotional health limited your social activities with family, friends, neighbors, or other groups?: Moderately     15.) How confident are you that you can control and manage most of your health problems?: Somewhat confident       Patient to complete POA while in office today    Vision and Hearing screens:   Vision Screening    Right eye Left eye Both eyes   Without correction 20/80 20/60 20/50   With correction      Hearing Screening - Comments:: pass  Following w/ ophthalmologist     Advance care planning documents on file - no    Cognitive/Functional Status: no evidence of cognitive dysfunction by direct observation    Opioid Review: Rich is not taking opioid medications.    Recent PHQ 2/9 Score:    PHQ 2:  PHQ 2 Score Adult PHQ 2 Score Adult PHQ 2 Interpretation Little interest or pleasure in activity?   3/19/2024  10:39 AM 2 No further screening needed 1       DEPRESSION ASSESSMENT/PLAN:  Following w/ behavorial health.  And psychiatry    Body mass index is 27.85 kg/m².    BMI ASSESSMENT/PLAN:  Patient is overweight.    30 minutes of physical activity a day        See Patient Instructions section.   Return in about 1 year (around 3/19/2025) for Medicare Wellness Visit.      PROGRESS NOTESubjective  CHIEF COMPLAINT: general physical exam    Rich Sargent is a 56 year old male who presents for annual physical exam  - Exercise: walking, push-ups and resistant weight bands  - Diet: eats  fast food 1x/mo, frequent vegetables/fruits, no dietary restrictions   - Sleep: 4-5 hrs per night  - Caffeine: 3-4 cup per day  - water hydration: 1 glass/night    PrEP  - previously had stool changes w/ Truvada  - now on Descovy  - patient denies symptoms of medication (diarrhea, abd pain)  - condom use: always  - sexually active since last visit: yes, only oral  - participates in insertive/receptive anal and oral sex   - denies penile/rectal rash/discharge/odors/lesions    DM:  - dx: 2021  - no polyuria, polydipsia, polyphagia  - not currently taking medication  - some vision changes - reports has glaucoma and cataracts  - last eye exam: w/ Dr. Sharp sees q6mo    ACTIVE PROBLEMS:    Patient Active Problem List   Diagnosis    Benign enlargement of prostate    Carpal tunnel syndrome    Diabetes type 2, controlled (CMD)    Erectile dysfunction    Hyperlipidemia    Essential hypertension    Overweight (BMI 25.0-29.9)    Dental caries    Plantar fascial fibromatosis    Acquired deformity of right foot    Seasonal allergies    Major depressive disorder, recurrent, moderate (CMS/HCC)    Bipolar disorder (CMD)    Anxiety    Diarrhea    Prostate cancer (CMD)    History of colonic polyps    Diverticular disease of colon    Polyp of colon       PAST HISTORIES:    Past Medical History:   Diagnosis Date    Acquired pes valgus     Age related cataract     Anxiety 11/15/2021    Benign enlargement of prostate 12/30/2015    Bipolar disorder (CMD) 11/15/2021    Diabetes type 2, controlled (CMD) 10/29/2018    Officially diabetic 11/2018    Essential hypertension 03/25/2015    Hyperlipidemia 01/26/2016    Major depressive disorder, recurrent, moderate (CMS/HCC) 10/21/2020    Plantar fascial fibromatosis     Prostate cancer (CMD)     Syphilis 06/27/2014    Overview:  3/1/12 rpr 1:32 rx 3wks 2.4mu pcn          Past Surgical History:   Procedure Laterality Date    Prostate biopsy  03/16/2022     Family History   Problem Relation Age of  Onset    Diabetes Mother     Kidney disease Mother     Heart disease Father     Patient is unaware of any medical problems Brother         estranged    Stroke Half-Brother     Cancer, Lung Half-Brother 60    Depression Half-Sister     Other Half-Sister         anorexia    Patient is unaware of any medical problems Half-Sister     Myocardial Infarction Half-Sister 65    Patient is unaware of any medical problems Half-Sister      Social History     Occupational History    Occupation:    Tobacco Use    Smoking status: Never    Smokeless tobacco: Never   Vaping Use    Vaping Use: never used   Substance and Sexual Activity    Alcohol use: No    Drug use: Never    Sexual activity: Yes     Partners: Male     Birth control/protection: None       REVIEW OF SYSTEMS:   Review of Systems   Constitutional:  Negative for fatigue, fever and unexpected weight change.   HENT:  Negative for congestion, ear pain, hearing loss, rhinorrhea, sinus pressure and sore throat.    Eyes:  Negative for pain and visual disturbance.   Respiratory:  Negative for cough and shortness of breath.    Cardiovascular:  Negative for chest pain and leg swelling.   Gastrointestinal:  Negative for abdominal pain, blood in stool, constipation, diarrhea, nausea and vomiting.   Genitourinary:  Negative for difficulty urinating, dysuria and hematuria.   Musculoskeletal:  Negative for arthralgias and back pain.   Skin:  Negative for rash.   Allergic/Immunologic: Negative for environmental allergies.   Neurological:  Positive for light-headedness. Negative for dizziness, weakness, numbness and headaches.   Psychiatric/Behavioral:  The patient is nervous/anxious.        Objective  PHYSICAL EXAM:    Visit Vitals  /82 (BP Location: LUE - Left upper extremity, Patient Position: Sitting, Cuff Size: Regular)   Pulse 71   Temp 96.8 °F (36 °C) (Tympanic)   Resp 16   Ht 5' 10\" (1.778 m)   Wt 88 kg (194 lb 1.8 oz)   SpO2 96%   BMI 27.85 kg/m²     Physical  Exam  Vitals reviewed.   Constitutional:       General: He is not in acute distress.     Appearance: He is well-developed.   HENT:      Head: Normocephalic and atraumatic.      Right Ear: Tympanic membrane, ear canal and external ear normal. There is no impacted cerumen.      Left Ear: Tympanic membrane, ear canal and external ear normal. There is no impacted cerumen.      Mouth/Throat:      Mouth: Mucous membranes are moist.      Pharynx: Oropharynx is clear. No oropharyngeal exudate.   Eyes:      General:         Right eye: No discharge.         Left eye: No discharge.      Conjunctiva/sclera: Conjunctivae normal.   Neck:      Thyroid: No thyromegaly.   Cardiovascular:      Rate and Rhythm: Normal rate.      Heart sounds: Normal heart sounds.   Pulmonary:      Effort: Pulmonary effort is normal. No respiratory distress.      Breath sounds: Normal breath sounds. No wheezing.   Musculoskeletal:         General: No tenderness.      Cervical back: Neck supple.   Skin:     General: Skin is warm and dry.      Findings: No erythema or rash.   Neurological:      Mental Status: He is alert.      Coordination: Coordination normal.   Psychiatric:         Mood and Affect: Mood normal.         Behavior: Behavior normal.         Assessment/Plan:  Rich Sargent is a 56 year old male who presents for annual physical exam  - BMI: Body mass index is 27.85 kg/m²., Overweight, discussed importance of moderate intensity exercise for 30min 5d/week, limiting fast food/sugar intake  - BP: 133/82, borderline  - Depression screen: PHQ2: 2, patient following w/ therapy and psychiatry  - Start regular check-ups with dentist (q6mo) and eye examinations (q1yr)  - Lab orders today: see attached  - Declines: n/a  - Preventative services discussed today: UTD  - colonoscopy: (5/2021): diffuse diverticulosis, lt>rt, hepatic flexure polyps x2 s/p bx, remaining mucosa normal. Plan : high fiber diet, repeat colonoscopy due in 5 years from previous    -  discussed w/ patient need to increase water hydration, reviewed ED precautions, patient will let us know if lightheadedness is persistent    PREP:  - f/u in 3mo or sooner prn  - Medication sent: Descovy  - patient had SE related to Truvada  - encouraged condom and lubrication use  - CMP, UA, and HIV ordered  - STD screening: yes  - HIV 1/HIV 2 Antigen/Antibody Screen; Future  - RPR (Rapid Plasma Reagin) Traditional Syphilis Screen Algorithm; Future  - Chlamydia/Gonorrhea by Nucleic Acid Amplification  - Chlamydia/Gonorrhea by Nucleic Acid Amplification  - Comprehensive Metabolic Panel; Future  - Urinalysis & Reflex Microscopy With Culture If Indicated    Type 2 diabetes mellitus without complication, without long-term current use of insulin (CMD)  - annual labs today  - plan pending those results  - continue exercise/sugar reduction  - f/u test in 6mo  - annual eye/foot exams discussed and UTD  - vaccinations UTD  - Glycohemoglobin; Future  - Microalbumin Urine Random  - Comprehensive Metabolic Panel; Future    Prostate cancer (CMD)  - psa today  - patient reports [facility] no longer accepting insurance  - will get patient set up w/ new urology   - PSA; Future  - SERVICE TO UROLOGY    Erectile dysfunction associated with type 2 diabetes mellitus (CMD)  - stable  - continue cialis  - urology f/u provided  - control of DM encouraged    Patient to f/u in 1 year for annual exam or earlier prn  They left the office in no acute distress and understanding their plan of care. All of their questions were answered to their satisfaction.  They are encouraged to call with any questions or concerns, and return to the clinic as needed.    Afshin Kapadia, DO

## 2024-04-12 ENCOUNTER — TELEPHONE (OUTPATIENT)
Dept: HEMATOLOGY ONCOLOGY | Facility: CLINIC | Age: 29
End: 2024-04-12

## 2024-04-16 ENCOUNTER — TELEPHONE (OUTPATIENT)
Dept: HEMATOLOGY ONCOLOGY | Facility: CLINIC | Age: 29
End: 2024-04-16

## 2024-04-16 ENCOUNTER — APPOINTMENT (OUTPATIENT)
Dept: LAB | Facility: CLINIC | Age: 29
End: 2024-04-16
Payer: COMMERCIAL

## 2024-04-16 DIAGNOSIS — C81.00: ICD-10-CM

## 2024-04-16 LAB
ALBUMIN SERPL BCP-MCNC: 4.4 G/DL (ref 3.5–5)
ALP SERPL-CCNC: 49 U/L (ref 34–104)
ALT SERPL W P-5'-P-CCNC: 24 U/L (ref 7–52)
ANION GAP SERPL CALCULATED.3IONS-SCNC: 9 MMOL/L (ref 4–13)
AST SERPL W P-5'-P-CCNC: 72 U/L (ref 13–39)
BASOPHILS # BLD AUTO: 0.07 THOUSANDS/ÂΜL (ref 0–0.1)
BASOPHILS NFR BLD AUTO: 1 % (ref 0–1)
BILIRUB SERPL-MCNC: 0.49 MG/DL (ref 0.2–1)
BUN SERPL-MCNC: 15 MG/DL (ref 5–25)
CALCIUM SERPL-MCNC: 9 MG/DL (ref 8.4–10.2)
CHLORIDE SERPL-SCNC: 106 MMOL/L (ref 96–108)
CO2 SERPL-SCNC: 27 MMOL/L (ref 21–32)
CREAT SERPL-MCNC: 1.21 MG/DL (ref 0.6–1.3)
EOSINOPHIL # BLD AUTO: 0.37 THOUSAND/ÂΜL (ref 0–0.61)
EOSINOPHIL NFR BLD AUTO: 4 % (ref 0–6)
ERYTHROCYTE [DISTWIDTH] IN BLOOD BY AUTOMATED COUNT: 12.3 % (ref 11.6–15.1)
GFR SERPL CREATININE-BSD FRML MDRD: 79 ML/MIN/1.73SQ M
GLUCOSE P FAST SERPL-MCNC: 81 MG/DL (ref 65–99)
HCT VFR BLD AUTO: 43.3 % (ref 36.5–49.3)
HGB BLD-MCNC: 14.3 G/DL (ref 12–17)
IMM GRANULOCYTES # BLD AUTO: 0.06 THOUSAND/UL (ref 0–0.2)
IMM GRANULOCYTES NFR BLD AUTO: 1 % (ref 0–2)
LYMPHOCYTES # BLD AUTO: 2.19 THOUSANDS/ÂΜL (ref 0.6–4.47)
LYMPHOCYTES NFR BLD AUTO: 24 % (ref 14–44)
MCH RBC QN AUTO: 29.6 PG (ref 26.8–34.3)
MCHC RBC AUTO-ENTMCNC: 33 G/DL (ref 31.4–37.4)
MCV RBC AUTO: 90 FL (ref 82–98)
MONOCYTES # BLD AUTO: 0.58 THOUSAND/ÂΜL (ref 0.17–1.22)
MONOCYTES NFR BLD AUTO: 6 % (ref 4–12)
NEUTROPHILS # BLD AUTO: 5.93 THOUSANDS/ÂΜL (ref 1.85–7.62)
NEUTS SEG NFR BLD AUTO: 64 % (ref 43–75)
NRBC BLD AUTO-RTO: 0 /100 WBCS
PLATELET # BLD AUTO: 146 THOUSANDS/UL (ref 149–390)
PMV BLD AUTO: 11.4 FL (ref 8.9–12.7)
POTASSIUM SERPL-SCNC: 3.7 MMOL/L (ref 3.5–5.3)
PROT SERPL-MCNC: 6.7 G/DL (ref 6.4–8.4)
RBC # BLD AUTO: 4.83 MILLION/UL (ref 3.88–5.62)
SODIUM SERPL-SCNC: 142 MMOL/L (ref 135–147)
WBC # BLD AUTO: 9.2 THOUSAND/UL (ref 4.31–10.16)

## 2024-04-16 PROCEDURE — 80053 COMPREHEN METABOLIC PANEL: CPT

## 2024-04-16 PROCEDURE — 85025 COMPLETE CBC W/AUTO DIFF WBC: CPT

## 2024-04-16 PROCEDURE — 36415 COLL VENOUS BLD VENIPUNCTURE: CPT

## 2024-04-16 NOTE — TELEPHONE ENCOUNTER
Message left on voicemail as a reminder to complete labs ordered before upcoming appointment with  Dr Cortez.

## 2024-04-18 ENCOUNTER — OFFICE VISIT (OUTPATIENT)
Dept: HEMATOLOGY ONCOLOGY | Facility: CLINIC | Age: 29
End: 2024-04-18
Payer: COMMERCIAL

## 2024-04-18 VITALS
BODY MASS INDEX: 27.96 KG/M2 | TEMPERATURE: 98.7 F | SYSTOLIC BLOOD PRESSURE: 118 MMHG | OXYGEN SATURATION: 99 % | WEIGHT: 211 LBS | HEART RATE: 72 BPM | HEIGHT: 73 IN | RESPIRATION RATE: 17 BRPM | DIASTOLIC BLOOD PRESSURE: 80 MMHG

## 2024-04-18 DIAGNOSIS — D69.3 IDIOPATHIC THROMBOCYTOPENIC PURPURA (HCC): ICD-10-CM

## 2024-04-18 DIAGNOSIS — R74.01 ELEVATION OF LEVELS OF LIVER TRANSAMINASE LEVELS: ICD-10-CM

## 2024-04-18 DIAGNOSIS — C81.00: Primary | ICD-10-CM

## 2024-04-18 PROCEDURE — 99215 OFFICE O/P EST HI 40 MIN: CPT | Performed by: INTERNAL MEDICINE

## 2024-04-18 NOTE — PROGRESS NOTES
[facility] [place]  [address] [address]  [phone]    Jaime Schofield Jr.,1995, [id number]  04/18/24    Discussion:   In summary, this is a 29-year-old male with a history of nodular lymphocyte predominant Hodgkin's lymphoma as outlined.  Clinically, he is doing well.  He has upper respiratory congestion, postnasal drip.  This is gradually improving over a span of 2 weeks or so.  No fever.  No shortness of breath.  Recent CBC shows platelets 145, otherwise normal.  Differential normal.  CMP shows AST 72, otherwise normal.  Physical exam shows right greater than left axillary lymphadenopathy, max about 1.5 cm.  No hepatomegaly is palpable.  I suggested repeat CT prior to follow-up visit in 6 months.  We also elected to repeat CBC and chemistry to evaluate thrombocytopenia and AST elevation, respectively.  I asked him to call 2 days or so after blood work in a month.  Further investigation to follow accordingly.  I consider that these may be related to this recent URI.    I discussed the above with the patient.  The patient  voiced understanding and agreement.  ______________________________________________________________________    Chief Complaint   Patient presents with    Follow-up       HPI:  Oncology History   Stage IIA lymphocyte-predominant Hodgkin's disease (HCC)   2/21/2012 Initial Diagnosis    February 21, 2012 patient was found to have nodular lymphocyte predominant Hodgkin's lymphoma, bilateral axillary nodes.  He was treated with Rituxan weekly x4 followed by every 2 month Rituxan ending March 2014.  September 2016 patient had recurrence of axillary lymphadenopathy.  Biopsy showed no convincing evidence of recurrent disease.  Patient was restarted on Rituxan every 3 months February 2017- November 2018.  He was lost to follow-up thereafter.  Most recent imaging August 2018 CT abdomen pelvis showed no evidence of  "lymphadenopathy.  Chest was planned but not accomplished due to insurance discontinuation.     2/21/2012 -  Cancer Staged    Staging form: Hodgkin and Non-Hodgkin Lymphoma, AJCC 8th Edition  - Clinical stage from 2/21/2012: Stage II (Hodgkin lymphoma, A - Asymptomatic) - Signed by Stanley Cortez DO on 4/18/2024  Stage prefix: Initial diagnosis           Interval History: Clinically stable.  ECOG-  1 - Symptomatic but completely ambulatory    Review of Systems   Constitutional:  Negative for chills and fever.   HENT:  Positive for postnasal drip. Negative for nosebleeds.    Eyes:  Negative for discharge.   Respiratory:  Positive for cough. Negative for shortness of breath.    Cardiovascular:  Negative for chest pain.   Gastrointestinal:  Negative for abdominal pain, constipation and diarrhea.   Endocrine: Negative for polydipsia.   Genitourinary:  Negative for hematuria.   Musculoskeletal:  Negative for arthralgias.   Skin:  Negative for color change.   Allergic/Immunologic: Negative for immunocompromised state.   Neurological:  Negative for dizziness and headaches.   Hematological:  Negative for adenopathy.   Psychiatric/Behavioral:  Negative for agitation.        Past Medical History:   Diagnosis Date    Cancer (HCC)     hodgkin's lymphoma    ITP (idiopathic thrombocytopenic purpura)     Lymphoma (HCC)      Patient Active Problem List   Diagnosis    Idiopathic thrombocytopenic purpura (HCC)    Stage IIA lymphocyte-predominant Hodgkin's disease (HCC)    Allergic rhinitis    Breast lump    Urinary urgency    Dysphagia    Constipation    Rectal bleeding    Blood in stool    Gynecomastia    Urethral straddle injury       Current Outpatient Medications:     carbamide peroxide (DEBROX) 6.5 % otic solution, Administer 5 drops into the left ear 2 (two) times a day for 5 days, Disp: 15 mL, Rfl: 0  Allergies   Allergen Reactions    Penicillins Hives     As an infant - \"lungs collapsed\" per mom     Past Surgical History: " "  Procedure Laterality Date    HERNIA REPAIR      umbilical - at 6 months of age    MT ARTHRS KNEE W/MENISCECTOMY MED&LAT W/SHAVING Left 1/25/2023    Procedure: LEFT knee arthroscopy, partial medial meniscectomy;  Surgeon: Kike Watt DO;  Location: [facility];  Service: Orthopedics    MT BX/EXC LYMPH NODE OPEN DEEP AXILLARY NODE Left 9/26/2016    Procedure: AXILLARY LYMPH NODE BIOPSY;  Surgeon: Fabian Calloway MD;  Location:  MAIN OR;  Service: Surgical Oncology    SENTINEL LYMPH NODE BIOPSY Right      Social History     Objective:  Vitals:    04/18/24 0948   BP: 118/80   BP Location: Left arm   Patient Position: Sitting   Cuff Size: Adult   Pulse: 72   Resp: 17   Temp: 98.7 °F (37.1 °C)   SpO2: 99%   Weight: 95.7 kg (211 lb)   Height: 6' 1\" (1.854 m)     Physical Exam  Constitutional:       Appearance: He is well-developed.   HENT:      Head: Normocephalic and atraumatic.      Mouth/Throat:      Mouth: Mucous membranes are moist.   Eyes:      Pupils: Pupils are equal, round, and reactive to light.   Cardiovascular:      Rate and Rhythm: Normal rate and regular rhythm.      Heart sounds: No murmur heard.  Pulmonary:      Breath sounds: Normal breath sounds. No wheezing or rales.   Abdominal:      Palpations: Abdomen is soft.      Tenderness: There is no abdominal tenderness.   Musculoskeletal:         General: No tenderness. Normal range of motion.      Cervical back: Neck supple.   Lymphadenopathy:      Cervical: No cervical adenopathy.      Upper Body:      Right upper body: Axillary adenopathy present.      Left upper body: Axillary adenopathy present.      Comments: Right greater than left axillary adenopathy, max 1.5 cm.  Nontender.   Skin:     Findings: No erythema or rash.   Neurological:      Mental Status: He is alert and oriented to person, place, and time.      Cranial Nerves: No cranial nerve deficit.      Deep Tendon Reflexes: Reflexes are normal and symmetric.   Psychiatric:         Behavior: " Behavior normal.           Labs:  I personally reviewed the labs and imaging pertinent to this patient care.

## 2024-05-17 ENCOUNTER — APPOINTMENT (OUTPATIENT)
Age: 29
End: 2024-05-17
Payer: COMMERCIAL

## 2024-05-17 DIAGNOSIS — C81.00: ICD-10-CM

## 2024-05-17 LAB
ALBUMIN SERPL BCP-MCNC: 4.6 G/DL (ref 3.5–5)
ALP SERPL-CCNC: 46 U/L (ref 34–104)
ALT SERPL W P-5'-P-CCNC: 18 U/L (ref 7–52)
ANION GAP SERPL CALCULATED.3IONS-SCNC: 9 MMOL/L (ref 4–13)
AST SERPL W P-5'-P-CCNC: 24 U/L (ref 13–39)
BASOPHILS # BLD AUTO: 0.07 THOUSANDS/ÂΜL (ref 0–0.1)
BASOPHILS NFR BLD AUTO: 1 % (ref 0–1)
BILIRUB SERPL-MCNC: 0.46 MG/DL (ref 0.2–1)
BUN SERPL-MCNC: 16 MG/DL (ref 5–25)
CALCIUM SERPL-MCNC: 9.4 MG/DL (ref 8.4–10.2)
CHLORIDE SERPL-SCNC: 105 MMOL/L (ref 96–108)
CO2 SERPL-SCNC: 25 MMOL/L (ref 21–32)
CREAT SERPL-MCNC: 1.06 MG/DL (ref 0.6–1.3)
EOSINOPHIL # BLD AUTO: 0.21 THOUSAND/ÂΜL (ref 0–0.61)
EOSINOPHIL NFR BLD AUTO: 4 % (ref 0–6)
ERYTHROCYTE [DISTWIDTH] IN BLOOD BY AUTOMATED COUNT: 12.4 % (ref 11.6–15.1)
GFR SERPL CREATININE-BSD FRML MDRD: 93 ML/MIN/1.73SQ M
GLUCOSE P FAST SERPL-MCNC: 78 MG/DL (ref 65–99)
HCT VFR BLD AUTO: 42.7 % (ref 36.5–49.3)
HGB BLD-MCNC: 14.7 G/DL (ref 12–17)
IMM GRANULOCYTES # BLD AUTO: 0.02 THOUSAND/UL (ref 0–0.2)
IMM GRANULOCYTES NFR BLD AUTO: 0 % (ref 0–2)
LG PLATELETS BLD QL SMEAR: PRESENT
LYMPHOCYTES # BLD AUTO: 2.06 THOUSANDS/ÂΜL (ref 0.6–4.47)
LYMPHOCYTES NFR BLD AUTO: 37 % (ref 14–44)
MCH RBC QN AUTO: 30.4 PG (ref 26.8–34.3)
MCHC RBC AUTO-ENTMCNC: 34.4 G/DL (ref 31.4–37.4)
MCV RBC AUTO: 88 FL (ref 82–98)
MONOCYTES # BLD AUTO: 0.44 THOUSAND/ÂΜL (ref 0.17–1.22)
MONOCYTES NFR BLD AUTO: 8 % (ref 4–12)
NEUTROPHILS # BLD AUTO: 2.84 THOUSANDS/ÂΜL (ref 1.85–7.62)
NEUTS SEG NFR BLD AUTO: 50 % (ref 43–75)
NRBC BLD AUTO-RTO: 0 /100 WBCS
PLATELET # BLD AUTO: 91 THOUSANDS/UL (ref 149–390)
PLATELET BLD QL SMEAR: ABNORMAL
PMV BLD AUTO: 12.4 FL (ref 8.9–12.7)
POTASSIUM SERPL-SCNC: 3.9 MMOL/L (ref 3.5–5.3)
PROT SERPL-MCNC: 7 G/DL (ref 6.4–8.4)
RBC # BLD AUTO: 4.83 MILLION/UL (ref 3.88–5.62)
RBC MORPH BLD: PRESENT
SODIUM SERPL-SCNC: 139 MMOL/L (ref 135–147)
WBC # BLD AUTO: 5.64 THOUSAND/UL (ref 4.31–10.16)

## 2024-05-17 PROCEDURE — 80053 COMPREHEN METABOLIC PANEL: CPT

## 2024-05-17 PROCEDURE — 36415 COLL VENOUS BLD VENIPUNCTURE: CPT

## 2024-05-17 PROCEDURE — 85025 COMPLETE CBC W/AUTO DIFF WBC: CPT

## 2024-10-17 ENCOUNTER — HOSPITAL ENCOUNTER (OUTPATIENT)
Dept: CT IMAGING | Facility: HOSPITAL | Age: 29
End: 2024-10-17
Attending: INTERNAL MEDICINE
Payer: COMMERCIAL

## 2024-10-17 DIAGNOSIS — C81.00: ICD-10-CM

## 2024-10-17 PROCEDURE — 74177 CT ABD & PELVIS W/CONTRAST: CPT

## 2024-10-17 PROCEDURE — 71260 CT THORAX DX C+: CPT

## 2024-10-17 RX ADMIN — IOHEXOL 100 ML: 350 INJECTION, SOLUTION INTRAVENOUS at 08:02

## 2024-10-21 ENCOUNTER — TELEPHONE (OUTPATIENT)
Dept: HEMATOLOGY ONCOLOGY | Facility: CLINIC | Age: 29
End: 2024-10-21

## 2024-10-21 NOTE — TELEPHONE ENCOUNTER
Message left on voicemail as a reminder to complete labs ordered before upcoming appointment with Dr. Cortez.

## 2024-10-22 ENCOUNTER — APPOINTMENT (OUTPATIENT)
Age: 29
End: 2024-10-22
Payer: COMMERCIAL

## 2024-10-22 DIAGNOSIS — C81.00: ICD-10-CM

## 2024-10-22 LAB
BASOPHILS # BLD AUTO: 0.06 THOUSANDS/ΜL (ref 0–0.1)
BASOPHILS NFR BLD AUTO: 1 % (ref 0–1)
EOSINOPHIL # BLD AUTO: 0.17 THOUSAND/ΜL (ref 0–0.61)
EOSINOPHIL NFR BLD AUTO: 2 % (ref 0–6)
ERYTHROCYTE [DISTWIDTH] IN BLOOD BY AUTOMATED COUNT: 12.5 % (ref 11.6–15.1)
HCT VFR BLD AUTO: 43.6 % (ref 36.5–49.3)
HGB BLD-MCNC: 15 G/DL (ref 12–17)
IMM GRANULOCYTES # BLD AUTO: 0.02 THOUSAND/UL (ref 0–0.2)
IMM GRANULOCYTES NFR BLD AUTO: 0 % (ref 0–2)
LYMPHOCYTES # BLD AUTO: 1.91 THOUSANDS/ΜL (ref 0.6–4.47)
LYMPHOCYTES NFR BLD AUTO: 26 % (ref 14–44)
MCH RBC QN AUTO: 30.5 PG (ref 26.8–34.3)
MCHC RBC AUTO-ENTMCNC: 34.4 G/DL (ref 31.4–37.4)
MCV RBC AUTO: 89 FL (ref 82–98)
MONOCYTES # BLD AUTO: 0.67 THOUSAND/ΜL (ref 0.17–1.22)
MONOCYTES NFR BLD AUTO: 9 % (ref 4–12)
NEUTROPHILS # BLD AUTO: 4.56 THOUSANDS/ΜL (ref 1.85–7.62)
NEUTS SEG NFR BLD AUTO: 62 % (ref 43–75)
NRBC BLD AUTO-RTO: 0 /100 WBCS
PLATELET # BLD AUTO: 69 THOUSANDS/UL (ref 149–390)
PMV BLD AUTO: 12.8 FL (ref 8.9–12.7)
RBC # BLD AUTO: 4.91 MILLION/UL (ref 3.88–5.62)
WBC # BLD AUTO: 7.39 THOUSAND/UL (ref 4.31–10.16)

## 2024-10-22 PROCEDURE — 80053 COMPREHEN METABOLIC PANEL: CPT

## 2024-10-22 PROCEDURE — 85025 COMPLETE CBC W/AUTO DIFF WBC: CPT

## 2024-10-22 PROCEDURE — 36415 COLL VENOUS BLD VENIPUNCTURE: CPT

## 2024-10-23 LAB
ALBUMIN SERPL BCG-MCNC: 4.6 G/DL (ref 3.5–5)
ALP SERPL-CCNC: 43 U/L (ref 34–104)
ALT SERPL W P-5'-P-CCNC: 18 U/L (ref 7–52)
ANION GAP SERPL CALCULATED.3IONS-SCNC: 10 MMOL/L (ref 4–13)
AST SERPL W P-5'-P-CCNC: 27 U/L (ref 13–39)
BILIRUB SERPL-MCNC: 0.52 MG/DL (ref 0.2–1)
BUN SERPL-MCNC: 16 MG/DL (ref 5–25)
CALCIUM SERPL-MCNC: 9.3 MG/DL (ref 8.4–10.2)
CHLORIDE SERPL-SCNC: 104 MMOL/L (ref 96–108)
CO2 SERPL-SCNC: 27 MMOL/L (ref 21–32)
CREAT SERPL-MCNC: 1.05 MG/DL (ref 0.6–1.3)
GFR SERPL CREATININE-BSD FRML MDRD: 94 ML/MIN/1.73SQ M
GLUCOSE P FAST SERPL-MCNC: 82 MG/DL (ref 65–99)
POTASSIUM SERPL-SCNC: 4.1 MMOL/L (ref 3.5–5.3)
PROT SERPL-MCNC: 6.8 G/DL (ref 6.4–8.4)
SODIUM SERPL-SCNC: 141 MMOL/L (ref 135–147)

## 2024-10-24 ENCOUNTER — OFFICE VISIT (OUTPATIENT)
Dept: HEMATOLOGY ONCOLOGY | Facility: CLINIC | Age: 29
End: 2024-10-24
Payer: COMMERCIAL

## 2024-10-24 VITALS
TEMPERATURE: 98.4 F | BODY MASS INDEX: 27.17 KG/M2 | OXYGEN SATURATION: 99 % | DIASTOLIC BLOOD PRESSURE: 86 MMHG | HEART RATE: 82 BPM | SYSTOLIC BLOOD PRESSURE: 126 MMHG | HEIGHT: 73 IN | WEIGHT: 205 LBS | RESPIRATION RATE: 17 BRPM

## 2024-10-24 DIAGNOSIS — C81.08 NODULAR LYMPHOCYTE PREDOM HODGKIN LYMPHOMA, NODES MULT SITE (HCC): Primary | ICD-10-CM

## 2024-10-24 DIAGNOSIS — D69.6 THROMBOCYTOPENIA (HCC): ICD-10-CM

## 2024-10-24 PROCEDURE — 99215 OFFICE O/P EST HI 40 MIN: CPT | Performed by: INTERNAL MEDICINE

## 2024-10-24 NOTE — PROGRESS NOTES
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  Clinically he is doing well.  He has some pain in lower left molar.  Seeing dental.  Expects removal.  Recent CMP is normal.  WBC 7.3, hemoglobin 15.0, platelet count 69, normal differential.  MCV 89.  CT chest ab pelvis shows slight increase in nodes in the BL axillary and inguinal nodes.   We reviewed that it is likely that his disease is relapsing.   Significance of thrombocytopenia is unclear. Certainly myelophthisis is considered but I would expect other aberrations, not present. Bone marrow biopsy is applicable to investigate. Viral serologies are requested.   PET is arranged. History of ITP 2007. ?recurrence.  I discussed the above with the patient.  The patient  voiced understanding and agreement.  ______________________________________________________________________    Chief Complaint   Patient presents with    Follow-up       HPI:  Oncology History   Nodular lymphocyte predom Hodgkin lymphoma, nodes mult site (HCC)   2/21/2012 Initial Diagnosis    February 21, 2012 patient was found to have nodular lymphocyte predominant Hodgkin's lymphoma, bilateral axillary nodes.  He was treated with Rituxan weekly x4 followed by every 2 month Rituxan ending March 2014.  September 2016 patient had recurrence of axillary lymphadenopathy.  Biopsy showed no convincing evidence of recurrent disease.  Patient was restarted on Rituxan every 3 months February 2017- November 2018.  He was lost to follow-up thereafter.  Most recent imaging August 2018 CT abdomen pelvis showed no evidence of lymphadenopathy.  Chest was planned but not accomplished due to insurance discontinuation.     2/21/2012 -  Cancer Staged     "Staging form: Hodgkin and Non-Hodgkin Lymphoma, AJCC 8th Edition  - Clinical stage from 2/21/2012: Stage II (Hodgkin lymphoma, A - Asymptomatic) - Signed by Stanley Cortez DO on 4/18/2024  Stage prefix: Initial diagnosis           Interval History:  Clinically stable.  ECOG-  0 - Asymptomatic    Review of Systems   Constitutional:  Negative for chills and fever.   HENT:  Negative for nosebleeds.    Eyes:  Negative for discharge.   Respiratory:  Negative for cough and shortness of breath.    Cardiovascular:  Negative for chest pain.   Gastrointestinal:  Negative for abdominal pain, constipation and diarrhea.   Endocrine: Negative for polydipsia.   Genitourinary:  Negative for hematuria.   Musculoskeletal:  Negative for arthralgias.   Skin:  Negative for color change.   Allergic/Immunologic: Negative for immunocompromised state.   Neurological:  Negative for dizziness and headaches.   Hematological:  Negative for adenopathy.   Psychiatric/Behavioral:  Negative for agitation.        Past Medical History:   Diagnosis Date    Cancer (HCC)     hodgkin's lymphoma    ITP (idiopathic thrombocytopenic purpura)     Lymphoma (HCC)      Patient Active Problem List   Diagnosis    Idiopathic thrombocytopenic purpura (HCC)    Nodular lymphocyte predom Hodgkin lymphoma, nodes mult site (HCC)    Allergic rhinitis    Breast lump    Urinary urgency    Dysphagia    Constipation    Rectal bleeding    Blood in stool    Gynecomastia    Urethral straddle injury    Elevation of levels of liver transaminase levels       Current Outpatient Medications:     carbamide peroxide (DEBROX) 6.5 % otic solution, Administer 5 drops into the left ear 2 (two) times a day for 5 days, Disp: 15 mL, Rfl: 0  Allergies   Allergen Reactions    Penicillins Hives     As an infant - \"lungs collapsed\" per mom     Past Surgical History:   Procedure Laterality Date    HERNIA REPAIR      umbilical - at 6 months of age    [place] ARTHRS KNEE W/MENISCECTOMY MED&LAT " "W/SHAVING Left 1/25/2023    Procedure: LEFT knee arthroscopy, partial medial meniscectomy;  Surgeon: Kike Watt DO;  Location: [facility];  Service: Orthopedics    TX BX/EXC LYMPH NODE OPEN DEEP AXILLARY NODE Left 9/26/2016    Procedure: AXILLARY LYMPH NODE BIOPSY;  Surgeon: Fabian Calloway MD;  Location:  MAIN OR;  Service: Surgical Oncology    SENTINEL LYMPH NODE BIOPSY Right      Social History     Objective:  Vitals:    10/24/24 0943   BP: 126/86   BP Location: Left arm   Patient Position: Sitting   Cuff Size: Adult   Pulse: 82   Resp: 17   Temp: 98.4 °F (36.9 °C)   SpO2: 99%   Weight: 93 kg (205 lb)   Height: 6' 1\" (1.854 m)     Physical Exam  Constitutional:       Appearance: He is well-developed.   HENT:      Head: Normocephalic and atraumatic.      Mouth/Throat:      Mouth: Mucous membranes are moist.   Eyes:      Pupils: Pupils are equal, round, and reactive to light.   Cardiovascular:      Rate and Rhythm: Normal rate and regular rhythm.      Heart sounds: No murmur heard.  Pulmonary:      Breath sounds: Normal breath sounds. No wheezing or rales.   Abdominal:      Palpations: Abdomen is soft.      Tenderness: There is no abdominal tenderness.   Musculoskeletal:         General: No tenderness. Normal range of motion.      Cervical back: Neck supple.   Lymphadenopathy:      Cervical: No cervical adenopathy.   Skin:     Findings: No erythema or rash.   Neurological:      Mental Status: He is alert and oriented to person, place, and time.      Cranial Nerves: No cranial nerve deficit.      Deep Tendon Reflexes: Reflexes are normal and symmetric.   Psychiatric:         Behavior: Behavior normal.           Labs:  I personally reviewed the labs and imaging pertinent to this patient care.  "

## 2024-11-04 NOTE — PRE-PROCEDURE INSTRUCTIONS
Pre-procedure Instructions for Interventional Radiology  [address] [address]  INTERVENTIONAL RADIOLOGY [phone]    You are scheduled for a/an bone marrow biopsy.    On Tuesday 11-12-24.    Your tentative arrival time is 8:45am.  Short stay will notify you the day before your procedure with the exact arrival time and the location to arrive.    To prepare for your procedure:  Please arrange for someone to drive you home after the procedure and stay with you until the next morning if you are instructed to do so.  This is typically for patients receiving some type of sedative or anesthetic for the procedure.  DO NOT EAT OR DRINK ANYTHING after midnight on the evening before your procedure including candy & gum.  ONLY SIPS OF WATER with your medications are allowed on the morning of your procedure.  TAKE ALL OF YOUR REGULAR MEDICATIONS THE MORNING OF YOUR PROCEDURE with sips of water!  We may call you to stop some of your blood sugar, blood pressure and blood thinning medications depending on the procedure.  Please take all of these medications unless we instruct you to stop them.  If you have an allergy to x-ray dye, please contact Interventional Radiology for an x-ray dye preparation which usually consists of an oral steroid and Benadryl.    The day of your procedure:  Bring a list of the medications you take at home.  Bring medications you take for breathing problems (such as inhalers), medications for chest pain, or both.  Bring a case for your glasses or contacts.  Bring your insurance card and a form of photo ID.  Please leave all valuables such as credit cards and jewelry at home.  Report to the admitting office to the left of the registration desk in the main lobby at the [facility], Entrance B.  You will then be directed to the Short Stay Center.  While your procedure is being performed, your family may wait in the Radiology Waiting Room on the 1st floor in  Radiology.  if they need to leave, they may provide a number to be called following the procedure.   Be prepared to stay overnight just in case. Sometimes procedures will indicate the need for further observation or treatment.   If you are scheduled for a follow-up visit with the Interventional Radiologist after your procedure, you will be called with a date and time.    Special Instructions (Medications to stop taking before your procedure etc.):

## 2024-11-07 ENCOUNTER — HOSPITAL ENCOUNTER (OUTPATIENT)
Dept: RADIOLOGY | Age: 29
Discharge: HOME | End: 2024-11-07
Payer: COMMERCIAL

## 2024-11-07 DIAGNOSIS — C81.08 NODULAR LYMPHOCYTE PREDOM HODGKIN LYMPHOMA, NODES MULT SITE (HCC): ICD-10-CM

## 2024-11-07 LAB — GLUCOSE SERPL-MCNC: 80 MG/DL (ref 65–140)

## 2024-11-07 PROCEDURE — 82948 REAGENT STRIP/BLOOD GLUCOSE: CPT

## 2024-11-07 PROCEDURE — A9552 F18 FDG: HCPCS

## 2024-11-07 PROCEDURE — 78815 PET IMAGE W/CT SKULL-THIGH: CPT

## 2024-11-12 ENCOUNTER — HOSPITAL ENCOUNTER (OUTPATIENT)
Dept: RADIOLOGY | Facility: HOSPITAL | Age: 29
Discharge: HOME | End: 2024-11-12
Attending: INTERNAL MEDICINE
Payer: COMMERCIAL

## 2024-11-12 VITALS
SYSTOLIC BLOOD PRESSURE: 119 MMHG | DIASTOLIC BLOOD PRESSURE: 59 MMHG | HEIGHT: 73 IN | TEMPERATURE: 97.8 F | RESPIRATION RATE: 18 BRPM | BODY MASS INDEX: 27.17 KG/M2 | WEIGHT: 205 LBS | HEART RATE: 61 BPM | OXYGEN SATURATION: 99 %

## 2024-11-12 DIAGNOSIS — C81.08 NODULAR LYMPHOCYTE PREDOM HODGKIN LYMPHOMA, NODES MULT SITE (HCC): ICD-10-CM

## 2024-11-12 LAB
ERYTHROCYTE [DISTWIDTH] IN BLOOD BY AUTOMATED COUNT: 12.2 % (ref 11.6–15.1)
HCT VFR BLD AUTO: 45.5 % (ref 36.5–49.3)
HGB BLD-MCNC: 15.5 G/DL (ref 12–17)
MCH RBC QN AUTO: 30 PG (ref 26.8–34.3)
MCHC RBC AUTO-ENTMCNC: 34.1 G/DL (ref 31.4–37.4)
MCV RBC AUTO: 88 FL (ref 82–98)
NRBC BLD AUTO-RTO: 0 /100 WBCS
PLATELET # BLD AUTO: 153 THOUSANDS/UL (ref 149–390)
PMV BLD AUTO: 11.1 FL (ref 8.9–12.7)
RBC # BLD AUTO: 5.17 MILLION/UL (ref 3.88–5.62)
WBC # BLD AUTO: 6.76 THOUSAND/UL (ref 4.31–10.16)

## 2024-11-12 PROCEDURE — 85007 BL SMEAR W/DIFF WBC COUNT: CPT | Performed by: RADIOLOGY

## 2024-11-12 PROCEDURE — 38221 DX BONE MARROW BIOPSIES: CPT

## 2024-11-12 PROCEDURE — 38222 DX BONE MARROW BX & ASPIR: CPT

## 2024-11-12 PROCEDURE — 88360 TUMOR IMMUNOHISTOCHEM/MANUAL: CPT | Performed by: PATHOLOGY

## 2024-11-12 PROCEDURE — C1830 POWER BONE MARROW BX NEEDLE: HCPCS

## 2024-11-12 PROCEDURE — 81479 UNLISTED MOLECULAR PATHOLOGY: CPT | Performed by: INTERNAL MEDICINE

## 2024-11-12 PROCEDURE — 88342 IMHCHEM/IMCYTCHM 1ST ANTB: CPT | Performed by: PATHOLOGY

## 2024-11-12 PROCEDURE — 88185 FLOWCYTOMETRY/TC ADD-ON: CPT | Performed by: INTERNAL MEDICINE

## 2024-11-12 PROCEDURE — 88264 CHROMOSOME ANALYSIS 20-25: CPT | Performed by: INTERNAL MEDICINE

## 2024-11-12 PROCEDURE — 88365 INSITU HYBRIDIZATION (FISH): CPT | Performed by: PATHOLOGY

## 2024-11-12 PROCEDURE — 88341 IMHCHEM/IMCYTCHM EA ADD ANTB: CPT | Performed by: PATHOLOGY

## 2024-11-12 PROCEDURE — 88313 SPECIAL STAINS GROUP 2: CPT | Performed by: PATHOLOGY

## 2024-11-12 PROCEDURE — 88184 FLOWCYTOMETRY/ TC 1 MARKER: CPT | Performed by: INTERNAL MEDICINE

## 2024-11-12 PROCEDURE — 88305 TISSUE EXAM BY PATHOLOGIST: CPT | Performed by: PATHOLOGY

## 2024-11-12 PROCEDURE — 88364 INSITU HYBRIDIZATION (FISH): CPT | Performed by: PATHOLOGY

## 2024-11-12 PROCEDURE — 85097 BONE MARROW INTERPRETATION: CPT | Performed by: PATHOLOGY

## 2024-11-12 PROCEDURE — 88311 DECALCIFY TISSUE: CPT | Performed by: PATHOLOGY

## 2024-11-12 PROCEDURE — 88237 TISSUE CULTURE BONE MARROW: CPT | Performed by: INTERNAL MEDICINE

## 2024-11-12 RX ORDER — MIDAZOLAM HYDROCHLORIDE 2 MG/2ML
INJECTION, SOLUTION INTRAMUSCULAR; INTRAVENOUS AS NEEDED
Status: COMPLETED | OUTPATIENT
Start: 2024-11-12 | End: 2024-11-12

## 2024-11-12 RX ORDER — SODIUM CHLORIDE 9 MG/ML
75 INJECTION, SOLUTION INTRAVENOUS CONTINUOUS
Status: DISCONTINUED | OUTPATIENT
Start: 2024-11-12 | End: 2024-11-13 | Stop reason: HOSPADM

## 2024-11-12 RX ORDER — FENTANYL CITRATE 50 UG/ML
INJECTION, SOLUTION INTRAMUSCULAR; INTRAVENOUS AS NEEDED
Status: COMPLETED | OUTPATIENT
Start: 2024-11-12 | End: 2024-11-12

## 2024-11-12 RX ORDER — LIDOCAINE WITH 8.4% SOD BICARB 0.9%(10ML)
SYRINGE (ML) INJECTION AS NEEDED
Status: COMPLETED | OUTPATIENT
Start: 2024-11-12 | End: 2024-11-12

## 2024-11-12 RX ORDER — ACETAMINOPHEN 325 MG/1
650 TABLET ORAL EVERY 4 HOURS PRN
Status: DISCONTINUED | OUTPATIENT
Start: 2024-11-12 | End: 2024-11-13 | Stop reason: HOSPADM

## 2024-11-12 RX ADMIN — Medication 10 ML: at 09:34

## 2024-11-12 RX ADMIN — FENTANYL CITRATE 50 MCG: 50 INJECTION INTRAMUSCULAR; INTRAVENOUS at 09:26

## 2024-11-12 RX ADMIN — MIDAZOLAM 1 MG: 1 INJECTION INTRAMUSCULAR; INTRAVENOUS at 09:26

## 2024-11-12 RX ADMIN — FENTANYL CITRATE 50 MCG: 50 INJECTION INTRAMUSCULAR; INTRAVENOUS at 09:32

## 2024-11-12 RX ADMIN — MIDAZOLAM 1 MG: 1 INJECTION INTRAMUSCULAR; INTRAVENOUS at 09:32

## 2024-11-12 RX ADMIN — SODIUM CHLORIDE 75 ML/HR: 0.9 INJECTION, SOLUTION INTRAVENOUS at 08:57

## 2024-11-12 NOTE — DISCHARGE INSTRUCTIONS
Bone Marrow Biopsy     WHAT YOU NEED TO KNOW:   A bone marrow biopsy is a procedure to remove a small amount of bone marrow from your bone. Bone marrow is the soft tissue inside your bone that helps to make blood cells. The sample is tested for disease or infection.    DISCHARGE INSTRUCTIONS:     1. Limit your activities day of biopsy as directed by your doctor.    2. Use medication as ordered.    3. Return to your normal diet.Small sips of flat soda will help with nausea.    4. Remove band-aid or dressing 24 hours after procedure.    Contact Interventional Radiology at [phone] ([place] PATIENTS: Contact Interventional Radiology at [phone]) ([place] PATIENTS: Contact Interventional Radiology at [phone]) if:    1. Difficulty breathing, nausea or vomiting.    2. Chills or fever above 101 F.    3. Pain at biopsy site not relieved by medication.    4. Develop any redness, swelling, heat, unusual drainage, heavy bruising or bleeding from biopsy site.

## 2024-11-12 NOTE — H&P
"Interventional Radiology  History and Physical 11/12/2024     Jaime Schofield Jr.   1995   [id number]    H&P reviewed. There have been no interval changes since the time the H&P was written.    /75   Pulse 67   Temp 97.7 °F (36.5 °C) (Temporal)   Resp 18   Ht 6' 1\" (1.854 m)   Wt 93 kg (205 lb)   SpO2 100%   BMI 27.05 kg/m²     Prior imaging was reviewed.  29-year-old with diagnosis of lymphoma.  Referred for bone marrow biopsy.  Procedure discussed and all questions answered.    Informed written consent was obtained.    Leo Garcia MD   "

## 2024-11-12 NOTE — BRIEF OP NOTE (RAD/CATH)
INTERVENTIONAL RADIOLOGY PROCEDURE NOTE    Date: 11/12/2024    Procedure:   Procedure Summary       Date: 11/12/24 Room / Location: [facility] CAT Scan    Anesthesia Start:  Anesthesia Stop:     Procedure: IR BIOPSY BONE MARROW Diagnosis:       Nodular lymphocyte predom Hodgkin lymphoma, nodes mult site (HCC)      (Hodgkins)    Scheduled Providers:  Responsible Provider:     Anesthesia Type: Not recorded ASA Status: Not recorded            Preoperative diagnosis:   1. Nodular lymphocyte predom Hodgkin lymphoma, nodes mult site (HCC)         Postoperative diagnosis: Same.    Surgeon: Leo Garcia MD     Assistant: None. No qualified resident was available.    Blood loss: less than 10ml     Specimens: asp and core      Findings: bone marrow biopsy with image guidance     Complications: None immediate.    Anesthesia: conscious sedation

## 2024-11-12 NOTE — SEDATION DOCUMENTATION
Bone marrow biopsy of right iliac crest by Dr. Garcia without complications. Procedure tolerated well by patient, VSS. IR Procedure Bedrest Start Time is 0940.

## 2024-11-13 PROCEDURE — 85060 BLOOD SMEAR INTERPRETATION: CPT | Performed by: PATHOLOGY

## 2024-11-14 LAB
BASOPHILS # BLD AUTO: 0.07 THOUSAND/UL (ref 0–0.1)
BASOPHILS NFR MAR MANUAL: 1 % (ref 0–1)
EOSINOPHIL # BLD AUTO: 0.63 THOUSAND/UL (ref 0–0.61)
EOSINOPHIL NFR BLD MANUAL: 9 % (ref 0–6)
LYMPHOCYTES # BLD AUTO: 1.81 THOUSAND/UL (ref 0.6–4.47)
LYMPHOCYTES # BLD AUTO: 27 %
MONOCYTES # BLD AUTO: 0.42 THOUSAND/UL (ref 0–1.22)
MONOCYTES NFR BLD AUTO: 6 % (ref 4–12)
NEUTS BAND NFR BLD MANUAL: 2 % (ref 0–8)
NEUTS SEG # BLD: 3.83 THOUSAND/UL (ref 1.81–6.82)
NEUTS SEG NFR BLD AUTO: 55 %
PATHOLOGY REVIEW: YES
PLATELET BLD QL SMEAR: ADEQUATE
SCAN RESULT: NORMAL
TOTAL CELLS COUNTED SPEC: 97

## 2024-11-19 LAB
MISCELLANEOUS LAB TEST RESULT: NORMAL
SCAN RESULT: NORMAL

## 2024-11-20 PROCEDURE — 88360 TUMOR IMMUNOHISTOCHEM/MANUAL: CPT | Performed by: PATHOLOGY

## 2024-11-20 PROCEDURE — 88342 IMHCHEM/IMCYTCHM 1ST ANTB: CPT | Performed by: PATHOLOGY

## 2024-11-20 PROCEDURE — 88341 IMHCHEM/IMCYTCHM EA ADD ANTB: CPT | Performed by: PATHOLOGY

## 2024-11-20 PROCEDURE — 88311 DECALCIFY TISSUE: CPT | Performed by: PATHOLOGY

## 2024-11-20 PROCEDURE — 88305 TISSUE EXAM BY PATHOLOGIST: CPT | Performed by: PATHOLOGY

## 2024-11-20 PROCEDURE — 88364 INSITU HYBRIDIZATION (FISH): CPT | Performed by: PATHOLOGY

## 2024-11-20 PROCEDURE — 88365 INSITU HYBRIDIZATION (FISH): CPT | Performed by: PATHOLOGY

## 2024-11-20 PROCEDURE — 88313 SPECIAL STAINS GROUP 2: CPT | Performed by: PATHOLOGY

## 2024-11-20 PROCEDURE — 85097 BONE MARROW INTERPRETATION: CPT | Performed by: PATHOLOGY

## 2024-11-26 ENCOUNTER — APPOINTMENT (OUTPATIENT)
Dept: LAB | Facility: CLINIC | Age: 29
End: 2024-11-26
Payer: COMMERCIAL

## 2024-11-26 DIAGNOSIS — C81.08 NODULAR LYMPHOCYTE PREDOM HODGKIN LYMPHOMA, NODES MULT SITE (HCC): ICD-10-CM

## 2024-11-26 LAB
HBV SURFACE AB SER-ACNC: 27.3 MIU/ML
HBV SURFACE AG SER QL: NORMAL
HCV AB SER QL: NORMAL
HIV 1+2 AB+HIV1 P24 AG SERPL QL IA: NORMAL
HIV 2 AB SERPL QL IA: NORMAL
HIV1 AB SERPL QL IA: NORMAL
HIV1 P24 AG SERPL QL IA: NORMAL

## 2024-11-26 PROCEDURE — 36415 COLL VENOUS BLD VENIPUNCTURE: CPT

## 2024-11-26 PROCEDURE — 86803 HEPATITIS C AB TEST: CPT

## 2024-11-26 PROCEDURE — 86706 HEP B SURFACE ANTIBODY: CPT

## 2024-11-26 PROCEDURE — 87340 HEPATITIS B SURFACE AG IA: CPT

## 2024-11-26 PROCEDURE — 87389 HIV-1 AG W/HIV-1&-2 AB AG IA: CPT

## 2024-11-27 ENCOUNTER — TELEPHONE (OUTPATIENT)
Age: 29
End: 2024-11-27

## 2024-11-27 ENCOUNTER — OFFICE VISIT (OUTPATIENT)
Dept: HEMATOLOGY ONCOLOGY | Facility: CLINIC | Age: 29
End: 2024-11-27
Payer: COMMERCIAL

## 2024-11-27 VITALS
HEART RATE: 79 BPM | RESPIRATION RATE: 18 BRPM | SYSTOLIC BLOOD PRESSURE: 142 MMHG | OXYGEN SATURATION: 99 % | HEIGHT: 73 IN | DIASTOLIC BLOOD PRESSURE: 78 MMHG | TEMPERATURE: 97.9 F | BODY MASS INDEX: 26.77 KG/M2 | WEIGHT: 202 LBS

## 2024-11-27 DIAGNOSIS — C81.08 NODULAR LYMPHOCYTE PREDOM HODGKIN LYMPHOMA, NODES MULT SITE (HCC): Primary | ICD-10-CM

## 2024-11-27 DIAGNOSIS — D69.6 THROMBOCYTOPENIA (HCC): ICD-10-CM

## 2024-11-27 PROCEDURE — 99214 OFFICE O/P EST MOD 30 MIN: CPT | Performed by: INTERNAL MEDICINE

## 2024-11-27 NOTE — TELEPHONE ENCOUNTER
Please assist the patient with a sooner appointment. As per the clinical review the patient should be scheduled with in 7 days. Patient has been scheduled with Dr. Calloway at the [facility] as well as placed on the wait list.     Patient best contact number [phone]

## 2024-11-27 NOTE — PROGRESS NOTES
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  CT CAP w/ (11/7/24) showed slight increase in nodes in the b/l axillary and inguinal nodes suspected to be disease relapse.  PET CT (11/7/24) shows multifocal hypermetabolic supradiaphragmatic and infradiaphragmatic lymphadenopathy consistent with history of lymphoma and nonspecific hypermetabolic left parotid gland nodules.  BMBx (11/12/24) showed normocellular marrow showed no evidence of leukemia or lymphoma and recommended an excisional lymph node biopsy.  Flow cytometry showed inverted CD4/CD8 ratio.  Most recent labs (11/12/24) show WBC 6.8, Hb 15.5, Plt 153 (previously 69), HIV neg, and hep panel neg.  His unspecified thrombocytopenia (69, 10/22/24) has resolved (153, 11/12/24).  He has not follow up with a dentist, but plans to do so for left lower dental pain.  Clinically, patient is doing well.  He endorsed some mild axillary tenderness and is otherwise asymptomatic.  He denied fever/chills, night sweats, weight loss, N/V, and changes in appetite.  Overall, suspect patient has recurrence of his previously treated lymphoma, but he could also have a new lymphoma or sarcoidosis.    - Referral to surgical oncology for excisional biopsy, offered same day appointment but patient declined  - Return visit in three weeks    I discussed the above with the patient.  The patient  voiced understanding and agreement.  ______________________________________________________________________    Chief Complaint   Patient presents with    Follow-up     HPI:  Oncology History   Nodular lymphocyte predom Hodgkin lymphoma, nodes mult site (HCC)   2/21/2012 Initial  Diagnosis    February 21, 2012 patient was found to have nodular lymphocyte predominant Hodgkin's lymphoma, bilateral axillary nodes.  He was treated with Rituxan weekly x4 followed by every 2 month Rituxan ending March 2014.  September 2016 patient had recurrence of axillary lymphadenopathy.  Biopsy showed no convincing evidence of recurrent disease.  Patient was restarted on Rituxan every 3 months February 2017- November 2018.  He was lost to follow-up thereafter.  Most recent imaging August 2018 CT abdomen pelvis showed no evidence of lymphadenopathy.  Chest was planned but not accomplished due to insurance discontinuation.     2/21/2012 -  Cancer Staged    Staging form: Hodgkin and Non-Hodgkin Lymphoma, AJCC 8th Edition  - Clinical stage from 2/21/2012: Stage II (Hodgkin lymphoma, A - Asymptomatic) - Signed by Stanley Cortez DO on 4/18/2024  Stage prefix: Initial diagnosis         Interval History:  Clinically stable.  ECOG-  0 - Asymptomatic    Review of Systems   Constitutional:  Negative for chills and fever.   HENT:  Negative for nosebleeds.    Eyes:  Negative for discharge.   Respiratory:  Negative for cough and shortness of breath.    Cardiovascular:  Negative for chest pain.   Gastrointestinal:  Negative for abdominal pain, constipation and diarrhea.   Endocrine: Negative for polydipsia.   Genitourinary:  Negative for hematuria.   Musculoskeletal:  Negative for arthralgias.   Skin:  Negative for color change.   Allergic/Immunologic: Negative for immunocompromised state.   Neurological:  Negative for dizziness and headaches.   Hematological:  Negative for adenopathy.   Psychiatric/Behavioral:  Negative for agitation.      Past Medical History:   Diagnosis Date    Cancer (HCC)     hodgkin's lymphoma    ITP (idiopathic thrombocytopenic purpura)     Lymphoma (HCC)      Patient Active Problem List   Diagnosis    Thrombocytopenia (HCC)    Nodular lymphocyte predom Hodgkin lymphoma, nodes mult site ([facility])     "Allergic rhinitis    Breast lump    Urinary urgency    Dysphagia    Constipation    Rectal bleeding    Blood in stool    Gynecomastia    Urethral straddle injury    Elevation of levels of liver transaminase levels     No current outpatient medications on file.  Allergies   Allergen Reactions    Penicillins Hives     As an infant - \"lungs collapsed\" per mom     Past Surgical History:   Procedure Laterality Date    HERNIA REPAIR      umbilical - at 6 months of age    IR BIOPSY BONE MARROW  11/12/2024    AZ ARTHRS KNEE W/MENISCECTOMY MED&LAT W/SHAVING Left 1/25/2023    Procedure: LEFT knee arthroscopy, partial medial meniscectomy;  Surgeon: Kike Watt DO;  Location: [facility];  Service: Orthopedics    AZ BX/EXC LYMPH NODE OPEN DEEP AXILLARY NODE Left 9/26/2016    Procedure: AXILLARY LYMPH NODE BIOPSY;  Surgeon: Fabian Calloway MD;  Location: [facility];  Service: Surgical Oncology    SENTINEL LYMPH NODE BIOPSY Right      Social History     Objective:  Vitals:    11/27/24 1043   BP: 142/78   BP Location: Right arm   Patient Position: Sitting   Cuff Size: Adult   Pulse: 79   Resp: 18   Temp: 97.9 °F (36.6 °C)   TempSrc: Temporal   SpO2: 99%   Weight: 91.6 kg (202 lb)   Height: 6' 1\" (1.854 m)     Physical Exam  Constitutional:       Appearance: He is well-developed.   HENT:      Head: Normocephalic and atraumatic.      Mouth/Throat:      Mouth: Mucous membranes are moist.   Eyes:      Pupils: Pupils are equal, round, and reactive to light.   Cardiovascular:      Rate and Rhythm: Normal rate and regular rhythm.      Heart sounds: No murmur heard.  Pulmonary:      Breath sounds: Normal breath sounds. No wheezing or rales.   Abdominal:      Palpations: Abdomen is soft.      Tenderness: There is no abdominal tenderness.   Musculoskeletal:         General: No tenderness. Normal range of motion.      Cervical back: Neck supple.   Lymphadenopathy:      Cervical: No cervical adenopathy.   Skin:     Findings: No erythema or " rash.   Neurological:      Mental Status: He is alert and oriented to person, place, and time.      Cranial Nerves: No cranial nerve deficit.      Deep Tendon Reflexes: Reflexes are normal and symmetric.   Psychiatric:         Behavior: Behavior normal.     Labs:  I personally reviewed the labs and imaging pertinent to this patient care.    Imaging:  PET/CT SCAN (11/7/24)     INDICATION: C81.08: Nodular lymphocyte predominant hodgkin lymphoma, lymph nodes of multiple sites, restaging     MODIFIER: PS     COMPARISON: CT of chest, abdomen, and pelvis dated 10/17/2024     CELL TYPE:  nodular lymphocyte predom. Hodgkin's lymphoma (bx 2/21/12 b/l axillary nodes +)     TECHNIQUE:   7.8 mCi F-18-FDG administered IV. Multiplanar attenuation corrected and non attenuation corrected PET images are available for interpretation, and contiguous, low dose, axial CT sections were obtained from the skull base through the femurs.   Intravenous contrast material was not utilized. This examination, like all CT scans performed in the [facility], was performed utilizing techniques to minimize radiation dose exposure, including the use of iterative reconstruction and   automated exposure control.     Fasting serum glucose: 80 mg/dl     FINDINGS:     VISUALIZED BRAIN:  No acute abnormalities are seen.     HEAD/NECK:  On PET image 46 there is asymmetric focal nodular FDG activity about the deep aspect of the left parotid gland which is difficult to characterize on low-dose unenhanced CT measuring approximate 1.4 cm on PET imaging with max SUV of 15.0. On PET image 55   there is more superficial subcutaneous focal FDG activity about the inferior aspect of the left parotid gland which is also difficult to characterize on low-dose unenhanced CT with max SUV of 7.2. Findings are nonspecific with differential diagnosis   including lymphomatous chip hypermetabolic malignancy versus primary parotid gland neoplasm (benign versus  malignant).     There is multifocal hypermetabolic bilateral predominantly upper cervical lymphadenopathy which is poorly characterized on low-dose unenhanced CT. For example, in the right level 2 region on PET image 64 there is hypermetabolic focus measuring 1.4 cm on   PET imaging with max SUV of 8.2.     Note is made of symmetric intense FDG activity involving the bilateral tonsillar regions, possibly inflammatory with lymphomatous involvement not excluded.  CT images: Mild mucosal thickening involving the maxillary sinuses.     CHEST:  Multifocal hypermetabolic bilateral axillary and to a lesser degree retropectoral lymphadenopathy. For example, image 92 series 3, right axillary lymph node measures 2.0 cm in short axis with max SUV of 21.0.     Mild nonspecific heterogeneous activity associated with triangular-shaped soft tissue in the anterior mediastinal space, possibly reflecting benign activity related to thymic tissue with underlying hypermetabolic malignancy not excluded with max SUV of   approximately 3.5 in this region (for example image 118 of series 3 measuring 2.6 x 2.3 cm.     Mild nonspecific bilateral hilar activity without definitive correlate on low-dose unenhanced CT, possibly inflammatory with lymphomatous involvement not excluded.  CT images: Mild bilateral gynecomastia.     ABDOMEN:  Possible subtle subcentimeter left periaortic juxtarenal lymph node on PET image 168 with max SUV of 3.4.  CT images: Unremarkable.     PELVIS:  Multifocal bilateral hypermetabolic pelvic/inguinal lymphadenopathy. For example, on image 259 of series 3 there is a hypermetabolic left inguinal lymph node measuring 1.3 cm in short axis with max SUV of 21.0.     Nonspecific heterogeneous activity towards the rectosigmoid junction which is difficult to characterize on low-dose unenhanced CT, possibly physiologic/inflammatory.  CT images: Unremarkable.     OSSEOUS STRUCTURES:  No FDG avid lesions are seen.  CT images:  No significant findings.     IMPRESSION:     1. Multifocal hypermetabolic supradiaphragmatic and infradiaphragmatic lymphadenopathy consistent with history of lymphoma.     2. Nonspecific hypermetabolic left parotid gland nodules, see above for differential diagnosis.     BMBx (11/12/24)      Component  Ref Range & Units (hover)    Case Report   Surgical Pathology Report                         Case: [id number]                                   Authorizing Provider:  Stanley Cortez DO      Collected:           11/12/2024 0945               Ordering Location:     [facility]      Received:            11/12/2024 09                                      Hospital CAT Scan                                                             Pathologist:           Lashanda Lloyd MD                                                                   Specimens:   A) - Iliac Crest, Right, core                                                                        B) - Iliac Crest, Right, clot                                                                        C) - Iliac Crest, Right, slide                                                            Final Diagnosis   A-C. Bone Marrow, Right Iliac Crest, Core, Clot and Aspirate:  - Normocellular marrow (70% cellularity) with trilineage hematopoiesis.   - Increased iron stores.  - Minimal reticulin fibrosis.  - A normal male karyotype.  - B-Cell Gene Rearrangement by PCR: Negative.     Comment: Patient's history of Nodular lymphocyte predom Hodgkin lymphoma initially diagnosed in 2024 with lymphadenopathy currently is noted. There is no evidence of leukemia or lymphoma in current specimen. Clinical correlation is needed. Excisional biopsy of enlarged lymph node is suggested.     Intradepartmental consultation in agreement (RP).   Electronically signed by Lashanda Lloyd MD on 11/20/2024 at 1527 EST   Microscopic Description    Bone marrow aspirate: Adequate, M:E ratio  ~3:1  Megakaryopoiesis: Adequate with normal maturation  Erythropoiesis: Adequate with normal maturation                    Iron Content (aspirate): Rare ring sideroblasts identified (<5%)  Granulopoiesis: Adequate with normal maturation  Lymphocytes: Normal morphology   Plasma cells: Normal morphology      Biopsy and clot section:   Cellularity: 70%, normal for age  Iron content (biopsy and clot section): Increased  Reticulin stain: Minimal reticulin fibrosis (0-1 of 3 by the  Consensus grading scheme)  PAS stain: M:E ~3:1     Immunochemical stains performed on block A1 and B1 with appropriate controls show the following results:  -  CD34 highlights scattered myeloblasts without significant quantitative increase (<2% overall)  -   highlights immature myeloid and erythroid precursors with scattered mast cells within the interstitium  -  CD61 highlights scattered megakaryocytes  -  Scattered CD3/CD5/bcl-2 positive T lymphocytes and CD20/Okeene-5 positive B lymphocytes, C-Myc shows scattered positivity, CD30, bcl-1, bcl-6, CD10, CD23 and Ki67 reviewed supporting the diagnosis, LELE by APOLINAR is negative  -   and MUM1 highlight scattered plasma cells with polyclonal Kappa and Lambda expression by APOLINAR   Note    Component  Ref Range & Units (hover) 11/12/24 0856   WBC 6.76   RBC 5.17   Hemoglobin 15.5   Hematocrit 45.5   MCV 88   MCH 30.0   MCHC 34.1   RDW 12.2   MPV 11.1   Platelets 153      Component  Ref Range & Units (hover) 11/12/24 0856   Total Counted 97   Segmented % 55   Bands % 2   Lymphocytes % 27   Monocytes % 6   Eosinophils % 9 High    Basophils % 1   Absolute Neutrophils 3.83   Absolute Lymphocytes 1.81   Absolute Monocytes 0.42   Absolute Eosinophils 0.63 High    Absolute Basophils 0.07      Peripheral blood smear (11/12/2024) shows normochromic normocytic RBCs, normal number/morphology of platelets, reactive granulocytes, monocytes and lymphocytes.      Flow cytometry ([employer]#[id number] /  [id number], evaluated by Shawn Loyd M.D.):     Inverted T cell CD4/CD8 ratio.     Comments:  T cells show an inverted CD4/CD8 ratio without overt antigen aberrancy. The finding is not specific and may be seen in viral infection, autoimmune processes, and chemotherapy. Clinical correlation is recommended. No immunophenotypic evidence of a lymphoproliferative disorder, acute leukemia, increase in blasts, or plasma cell neoplasm is identified. Myeloproliferative neoplasms and myelodysplastic syndromes may not show antigenic abnormalities on myeloid cells and cannot be ruled out by flow cytometry. Please correlate the result with morphological findings, other pertinent laboratory data, and clinical information.     Molecular Genetics B-Cell Gene Rearrangement ([employer]#[id number] / [id number], evaluated by Johnson Fuentes, Ph.D., [facility]):          Cytogenetic studies ([employer]#[id number] / [id number], evaluated by Carmine Ford MD, MS, [facility], [facility] Cindy Barrera M.D.):     Karyotype:  46,XY[20]     Interpretation:    NORMAL MALE KARYOTYPE  Cytogenetic analysis shows a normal male karyotype in all cells analyzed.   Additional Information    All reported additional testing was performed with appropriately reactive controls.  These tests were developed and their performance characteristics determined by [facility] or appropriate performing facility, though some tests may be performed on tissues which have not been validated for performance characteristics (such as staining performed on alcohol exposed cell blocks and decalcified tissues).  Results should be interpreted with caution and in the context of the patients’ clinical condition. These tests may not be cleared or approved by the U.S. Food and Drug Administration, though the FDA has determined that such clearance or approval is not necessary. These tests are used for clinical purposes and they should not be regarded as  "investigational or for research. This laboratory has been approved by CLIA 88, designated as a high-complexity laboratory and is qualified to perform these tests.     Interpretation performed at [facility], [facility] [place] [address]   Gross Description    A. The specimen is received in formalin, labeled with the patient's name and hospital number, and is designated \"right iliac crest core\".  The specimen consists of a single tan-brown osseous tissue core measuring 0.3 cm in diameter and 0.3 cm in length.  The specimen is drained into an embedding bag.  Entirely submitted. One cassette. The specimen is placed into Immunocal after proper fixation time.  B. The specimen is received in formalin, labeled with the patient's name and hospital number, and is designated \"right iliac crest clot\".  The specimen consists of multiple red-brown hemorrhagic and clotted tissue fragments measuring in aggregate of 0.7 x 1.5 x 0.2 cm.  The specimen is drained into an embedding bag. Entirely submitted. One cassette.  C. The specimen is received, labeled with the patient's name and hospital number, and is designated \"right iliac crest slides\". The specimen consists of microscopic slides submitted for histological review. No cassettes submitted.     Note: The estimated total formalin fixation time based upon information provided by the submitting clinician and the standard processing schedule is under 72 hours. Jeromy Olivares   Clinical Information    Nodular lymphocyte predom Hodgkin lymphoma, nodes mult site ([facility])  2/21/2012 Initial Diagnosis    February 21, 2012 patient was found to have nodular lymphocyte predominant Hodgkin's lymphoma, bilateral axillary nodes.  He was treated with Rituxan weekly x4 followed by every 2 month Rituxan ending March 2014.  September 2016 patient had recurrence of axillary lymphadenopathy.  Biopsy showed no convincing evidence of recurrent disease.  Patient was restarted on Rituxan every 3 " months February 2017- November 2018.  He was lost to follow-up thereafter.  Most recent imaging August 2018 CT abdomen pelvis showed no evidence of lymphadenopathy.  Chest was planned but not accomplished due to insurance discontinuation.    PET-CT (11/7/2024):  1. Multifocal hypermetabolic supradiaphragmatic and infradiaphragmatic lymphadenopathy consistent with history of lymphoma.  2. Nonspecific hypermetabolic left parotid gland nodules, see above for differential diagnosis.          Additional recommendations to follow per attending, Dr. Cortez.    Mason Rosas DO, PGY4  Hematology/Oncology Fellow

## 2024-11-27 NOTE — PATIENT INSTRUCTIONS
Follow up with surgical oncology at earliest available appointment    Return for office visit in three weeks

## 2024-12-03 ENCOUNTER — TELEPHONE (OUTPATIENT)
Dept: SURGICAL ONCOLOGY | Facility: CLINIC | Age: 29
End: 2024-12-03

## 2024-12-03 NOTE — TELEPHONE ENCOUNTER
Called patient and left message stating that we have an opening tomorrow at 2pm with Dr. Calloway at [place] if he would like to move his appointment up sooner. I gave the [facility] number for him to call back and let us know if he would like to take the new appointment.

## 2024-12-09 PROBLEM — R59.1 LYMPHADENOPATHY: Status: ACTIVE | Noted: 2024-12-09

## 2024-12-10 ENCOUNTER — CONSULT (OUTPATIENT)
Dept: SURGICAL ONCOLOGY | Facility: CLINIC | Age: 29
End: 2024-12-10
Payer: COMMERCIAL

## 2024-12-10 VITALS
HEART RATE: 73 BPM | TEMPERATURE: 97.5 F | BODY MASS INDEX: 27.43 KG/M2 | HEIGHT: 73 IN | WEIGHT: 207 LBS | OXYGEN SATURATION: 99 % | SYSTOLIC BLOOD PRESSURE: 124 MMHG | DIASTOLIC BLOOD PRESSURE: 72 MMHG | RESPIRATION RATE: 18 BRPM

## 2024-12-10 DIAGNOSIS — R59.1 LYMPHADENOPATHY: Primary | ICD-10-CM

## 2024-12-10 DIAGNOSIS — C81.08 NODULAR LYMPHOCYTE PREDOM HODGKIN LYMPHOMA, NODES MULT SITE (HCC): ICD-10-CM

## 2024-12-10 PROCEDURE — 99204 OFFICE O/P NEW MOD 45 MIN: CPT | Performed by: SURGERY

## 2024-12-10 RX ORDER — OXYCODONE HYDROCHLORIDE 5 MG/1
5 TABLET ORAL EVERY 4 HOURS PRN
Qty: 10 TABLET | Refills: 0 | Status: SHIPPED | OUTPATIENT
Start: 2024-12-10

## 2024-12-10 NOTE — H&P (VIEW-ONLY)
Surgical Oncology Consult       [address] [facility] SURGICAL ONCOLOGY [facility]ARY  [address] [facility] NANCIAri[facility] HospitalTAQUERIA  [facility] [address]    Jaime Schofield Jr.  1995  [id number]  [address] [facility] SURGICAL ONCOLOGY [facility]ARY  [address] [facility]ALAY[facility] CHRISTINEArizona Spine and Joint HospitalTAQUERIA  [facility] [address]    1. Lymphadenopathy  Assessment & Plan:  29-year-old male with a history of Hodgkin's lymphoma.  It was recommended that he undergo lymph node biopsy.  Since a right axillary lymph node is palpable we will plan on biopsy of this node.  There is a right axillary lymph node biopsy with lymphoma protocol was explained and included bleeding, infection, recurrence, need for further surgery, wound complications and neurovascular damage.  Informed consent was obtained.  We will schedule this at our earliest mutual convenience.  He is agreeable to this plan.  All his questions were answered.  Orders:  -     Case request operating room: RIGHT AXILLARY LYMPH NODE EXCISION, LYMPHOMA PROTOCOL; Standing  -     Comprehensive metabolic panel; Future  -     CBC and differential; Future  -     EKG 12 lead; Future  -     oxyCODONE (Roxicodone) 5 immediate release tablet; Take 1 tablet (5 mg total) by mouth every 4 (four) hours as needed for moderate pain Max Daily Amount: 30 mg  -     Case request operating room: RIGHT AXILLARY LYMPH NODE EXCISION, LYMPHOMA PROTOCOL  2. Nodular lymphocyte predom Hodgkin lymphoma, nodes mult site (HCC)  -     Ambulatory Referral to Surgical Oncology  -     Case request operating room: RIGHT AXILLARY LYMPH NODE EXCISION, LYMPHOMA PROTOCOL; Standing  -     Case request operating room: RIGHT AXILLARY LYMPH NODE EXCISION, LYMPHOMA PROTOCOL      Chief Complaint   Patient presents with    Consult       No follow-ups on file.    Oncology History   Nodular lymphocyte predom Hodgkin lymphoma, nodes mult site (HCC)   2/21/2012 Initial Diagnosis    February 21, 2012 patient was found to have  nodular lymphocyte predominant Hodgkin's lymphoma, bilateral axillary nodes.  He was treated with Rituxan weekly x4 followed by every 2 month Rituxan ending March 2014.  September 2016 patient had recurrence of axillary lymphadenopathy.  Biopsy showed no convincing evidence of recurrent disease.  Patient was restarted on Rituxan every 3 months February 2017- November 2018.  He was lost to follow-up thereafter.  Most recent imaging August 2018 CT abdomen pelvis showed no evidence of lymphadenopathy.  Chest was planned but not accomplished due to insurance discontinuation.     2/21/2012 -  Cancer Staged    Staging form: Hodgkin and Non-Hodgkin Lymphoma, AJCC 8th Edition  - Clinical stage from 2/21/2012: Stage II (Hodgkin lymphoma, A - Asymptomatic) - Signed by Stanley Cortez DO on 4/18/2024  Stage prefix: Initial diagnosis           History of Present Illness: 29-year-old male with a history of lymphoma.  He is feeling well.  CT from October 17, 2024 reveals bilateral axillary adenopathy.  Left was greater than right.  There was pelvic and inguinal lymphadenopathy.  Follow-up PET/CT on November 7, 2024 revealed axillary as well as inguinal adenopathy.  I personally reviewed the films.  He comes in now to discuss lymph node biopsy.  He does feel a node under his right axilla.  No other adenopathy that he feels.  No fevers, chills or night sweats.  No fatigue or unintentional weight loss.    Review of Systems  Complete ROS Surg Onc:   Constitutional: The patient denies new or recent history of general fatigue, no recent weight loss, no change in appetite.   Eyes: No complaints of visual problems, no scleral icterus.   ENT: no complaints of ear pain, no hoarseness, no difficulty swallowing,  no tinnitus and no new masses in head, oral cavity, or neck.   Cardiovascular: No complaints of chest pain, no palpitations, no ankle edema.   Respiratory: No complaints of shortness of breath, no cough.   Gastrointestinal: No  complaints of jaundice, no bloody stools, no pale stools.   Genitourinary: No complaints of dysuria, no hematuria, no nocturia, no frequent urination, no urethral discharge.   Musculoskeletal: No complaints of weakness, paralysis, joint stiffness or arthralgias.  Integumentary: No complaints of rash, no new lesions.   Neurological: No complaints of convulsions, no seizures, no dizziness.   Hematologic/Lymphatic: No complaints of easy bruising.   Endocrine:  No hot or cold intolerance.  No polydipsia, polyphagia, or polyuria.  Allergy/immunology:  No environmental allergies.  No food allergies.  Not immunocompromised.  Skin:  No pallor or rash.  No wound.          Patient Active Problem List   Diagnosis    Thrombocytopenia (HCC)    Nodular lymphocyte predom Hodgkin lymphoma, nodes mult site (HCC)    Allergic rhinitis    Breast lump    Urinary urgency    Dysphagia    Constipation    Rectal bleeding    Blood in stool    Gynecomastia    Urethral straddle injury    Elevation of levels of liver transaminase levels    Lymphadenopathy     Past Medical History:   Diagnosis Date    Cancer (HCC)     hodgkin's lymphoma    ITP (idiopathic thrombocytopenic purpura)     Lymphoma (HCC)      Past Surgical History:   Procedure Laterality Date    HERNIA REPAIR      umbilical - at 6 months of age    IR BIOPSY BONE MARROW  11/12/2024    [place] ARTHRS KNEE W/MENISCECTOMY MED&LAT W/SHAVING Left 1/25/2023    Procedure: LEFT knee arthroscopy, partial medial meniscectomy;  Surgeon: Kike Watt DO;  Location: [facility];  Service: Orthopedics    [place] BX/EXC LYMPH NODE OPEN DEEP AXILLARY NODE Left 9/26/2016    Procedure: AXILLARY LYMPH NODE BIOPSY;  Surgeon: Fabian Calloway MD;  Location:  MAIN OR;  Service: Surgical Oncology    SENTINEL LYMPH NODE BIOPSY Right      Family History   Problem Relation Age of Onset    Breast cancer Maternal Aunt     Breast cancer Maternal Grandmother     Hodgkin's lymphoma Paternal Grandfather      Social History  "    Socioeconomic History    Marital status: Single     Spouse name: Not on file    Number of children: Not on file    Years of education: Not on file    Highest education level: Not on file   Occupational History    Not on file   Tobacco Use    Smoking status: Never    Smokeless tobacco: Never   Vaping Use    Vaping status: Never Used   Substance and Sexual Activity    Alcohol use: No    Drug use: No    Sexual activity: Yes   Other Topics Concern    Not on file   Social History Narrative    Not on file     Social Drivers of Health     Financial Resource Strain: Not on file   Food Insecurity: Not on file   Transportation Needs: Not on file   Physical Activity: Not on file   Stress: Not on file   Social Connections: Not on file   Intimate Partner Violence: Not on file   Housing Stability: Not on file       Current Outpatient Medications:     oxyCODONE (Roxicodone) 5 immediate release tablet, Take 1 tablet (5 mg total) by mouth every 4 (four) hours as needed for moderate pain Max Daily Amount: 30 mg, Disp: 10 tablet, Rfl: 0  Allergies   Allergen Reactions    Penicillins Hives     As an infant - \"lungs collapsed\" per mom     Vitals:    12/10/24 1315   BP: 124/72   Pulse: 73   Resp: 18   Temp: 97.5 °F (36.4 °C)   SpO2: 99%       Physical Exam   Constitutional: General appearance: The Patient is well-developed and well-nourished who appears the stated age in no acute distress. Patient is pleasant and talkative.     HEENT:  Normocephalic.  Sclerae are anicteric. Mucous membranes are moist. Neck is supple without adenopathy. No JVD.     Chest: The lungs are clear to auscultation.     Cardiac: Heart is regular rate.     Abdomen: Abdomen is soft, non-tender, non-distended and without masses.     Extremities: There is no clubbing or cyanosis. There is no edema.  Symmetric.  Neuro: Grossly nonfocal. Gait is normal.     Lymphatic: No evidence of cervical adenopathy bilaterally.   There is a palpable right axillary lymph node.  " No evidence of inguinal adenopathy bilaterally.     Skin: Warm, anicteric.    Psych:  Patient is pleasant and talkative.  Breasts:      Pathology:  [unfilled]    Labs:      Imaging  IR biopsy bone marrow  Result Date: 11/12/2024  Narrative: IMAGE-GUIDED BONE MARROW BIOPSY Indication: Lymphoma Procedure and Findings: The procedure was performed in the prone position. 1% lidocaine was used for local anesthetic and conscious sedation was administered. The right posterior inferior iliac spine was localized by CT and the low back was prepped and draped in sterile fashion. Using CT guidance, an 11g bone marrow needle was advanced through the cortex of the iliac spine into the marrow. Aspiration was performed and submitted to pathology for slide preparation.  Diagnostic marrow cells were identified preliminarily.  The needle was slightly withdrawn and redirected to obtain a core biopsy using the [employer] system. The core was submitted to pathology. The puncture site was cleansed and a dressing was applied. This examination, like all CT scans performed in the [facility], was performed utilizing techniques to minimize radiation dose exposure, including the use of iterative reconstruction and automated exposure control. Sedation (min) : 15 minutes     Impression: Impression: Technically successful image guided bone marrow aspiration and core biopsy Workstation performed: [id number]     I personally reviewed and interpreted the above laboratory and imaging data.

## 2024-12-10 NOTE — ASSESSMENT & PLAN NOTE
29-year-old male with a history of Hodgkin's lymphoma.  It was recommended that he undergo lymph node biopsy.  Since a right axillary lymph node is palpable we will plan on biopsy of this node.  There is a right axillary lymph node biopsy with lymphoma protocol was explained and included bleeding, infection, recurrence, need for further surgery, wound complications and neurovascular damage.  Informed consent was obtained.  We will schedule this at our earliest mutual convenience.  He is agreeable to this plan.  All his questions were answered.

## 2024-12-10 NOTE — PROGRESS NOTES
Surgical Oncology Consult       [address] [facility] SURGICAL ONCOLOGY [facility]  [address] [facility]ANCIF[facility] HealthcareTAQUERIA  [facility] [address]    Jaime Schofield Jr.  1995  [id number]  [address] [facility] SURGICAL ONCOLOGY [facility]  [address] [facility] CHRISTINEFlorence Community HealthcareTAQUERIA  [facility] [address]    1. Lymphadenopathy  Assessment & Plan:  29-year-old male with a history of Hodgkin's lymphoma.  It was recommended that he undergo lymph node biopsy.  Since a right axillary lymph node is palpable we will plan on biopsy of this node.  There is a right axillary lymph node biopsy with lymphoma protocol was explained and included bleeding, infection, recurrence, need for further surgery, wound complications and neurovascular damage.  Informed consent was obtained.  We will schedule this at our earliest mutual convenience.  He is agreeable to this plan.  All his questions were answered.  Orders:  -     Case request operating room: RIGHT AXILLARY LYMPH NODE EXCISION, LYMPHOMA PROTOCOL; Standing  -     Comprehensive metabolic panel; Future  -     CBC and differential; Future  -     EKG 12 lead; Future  -     oxyCODONE (Roxicodone) 5 immediate release tablet; Take 1 tablet (5 mg total) by mouth every 4 (four) hours as needed for moderate pain Max Daily Amount: 30 mg  -     Case request operating room: RIGHT AXILLARY LYMPH NODE EXCISION, LYMPHOMA PROTOCOL  2. Nodular lymphocyte predom Hodgkin lymphoma, nodes mult site (HCC)  -     Ambulatory Referral to Surgical Oncology  -     Case request operating room: RIGHT AXILLARY LYMPH NODE EXCISION, LYMPHOMA PROTOCOL; Standing  -     Case request operating room: RIGHT AXILLARY LYMPH NODE EXCISION, LYMPHOMA PROTOCOL      Chief Complaint   Patient presents with    Consult       No follow-ups on file.    Oncology History   Nodular lymphocyte predom Hodgkin lymphoma, nodes mult site (HCC)   2/21/2012 Initial Diagnosis    February 21, 2012 patient was found to have  nodular lymphocyte predominant Hodgkin's lymphoma, bilateral axillary nodes.  He was treated with Rituxan weekly x4 followed by every 2 month Rituxan ending March 2014.  September 2016 patient had recurrence of axillary lymphadenopathy.  Biopsy showed no convincing evidence of recurrent disease.  Patient was restarted on Rituxan every 3 months February 2017- November 2018.  He was lost to follow-up thereafter.  Most recent imaging August 2018 CT abdomen pelvis showed no evidence of lymphadenopathy.  Chest was planned but not accomplished due to insurance discontinuation.     2/21/2012 -  Cancer Staged    Staging form: Hodgkin and Non-Hodgkin Lymphoma, AJCC 8th Edition  - Clinical stage from 2/21/2012: Stage II (Hodgkin lymphoma, A - Asymptomatic) - Signed by Stanley Cortez DO on 4/18/2024  Stage prefix: Initial diagnosis           History of Present Illness: 29-year-old male with a history of lymphoma.  He is feeling well.  CT from October 17, 2024 reveals bilateral axillary adenopathy.  Left was greater than right.  There was pelvic and inguinal lymphadenopathy.  Follow-up PET/CT on November 7, 2024 revealed axillary as well as inguinal adenopathy.  I personally reviewed the films.  He comes in now to discuss lymph node biopsy.  He does feel a node under his right axilla.  No other adenopathy that he feels.  No fevers, chills or night sweats.  No fatigue or unintentional weight loss.    Review of Systems  Complete ROS Surg Onc:   Constitutional: The patient denies new or recent history of general fatigue, no recent weight loss, no change in appetite.   Eyes: No complaints of visual problems, no scleral icterus.   ENT: no complaints of ear pain, no hoarseness, no difficulty swallowing,  no tinnitus and no new masses in head, oral cavity, or neck.   Cardiovascular: No complaints of chest pain, no palpitations, no ankle edema.   Respiratory: No complaints of shortness of breath, no cough.   Gastrointestinal: No  complaints of jaundice, no bloody stools, no pale stools.   Genitourinary: No complaints of dysuria, no hematuria, no nocturia, no frequent urination, no urethral discharge.   Musculoskeletal: No complaints of weakness, paralysis, joint stiffness or arthralgias.  Integumentary: No complaints of rash, no new lesions.   Neurological: No complaints of convulsions, no seizures, no dizziness.   Hematologic/Lymphatic: No complaints of easy bruising.   Endocrine:  No hot or cold intolerance.  No polydipsia, polyphagia, or polyuria.  Allergy/immunology:  No environmental allergies.  No food allergies.  Not immunocompromised.  Skin:  No pallor or rash.  No wound.          Patient Active Problem List   Diagnosis    Thrombocytopenia (HCC)    Nodular lymphocyte predom Hodgkin lymphoma, nodes mult site (HCC)    Allergic rhinitis    Breast lump    Urinary urgency    Dysphagia    Constipation    Rectal bleeding    Blood in stool    Gynecomastia    Urethral straddle injury    Elevation of levels of liver transaminase levels    Lymphadenopathy     Past Medical History:   Diagnosis Date    Cancer (HCC)     hodgkin's lymphoma    ITP (idiopathic thrombocytopenic purpura)     Lymphoma (HCC)      Past Surgical History:   Procedure Laterality Date    HERNIA REPAIR      umbilical - at 6 months of age    IR BIOPSY BONE MARROW  11/12/2024    [place] ARTHRS KNEE W/MENISCECTOMY MED&LAT W/SHAVING Left 1/25/2023    Procedure: LEFT knee arthroscopy, partial medial meniscectomy;  Surgeon: Kike Watt DO;  Location: [facility];  Service: Orthopedics    [place] BX/EXC LYMPH NODE OPEN DEEP AXILLARY NODE Left 9/26/2016    Procedure: AXILLARY LYMPH NODE BIOPSY;  Surgeon: Fabian Calloway MD;  Location:  MAIN OR;  Service: Surgical Oncology    SENTINEL LYMPH NODE BIOPSY Right      Family History   Problem Relation Age of Onset    Breast cancer Maternal Aunt     Breast cancer Maternal Grandmother     Hodgkin's lymphoma Paternal Grandfather      Social History  "    Socioeconomic History    Marital status: Single     Spouse name: Not on file    Number of children: Not on file    Years of education: Not on file    Highest education level: Not on file   Occupational History    Not on file   Tobacco Use    Smoking status: Never    Smokeless tobacco: Never   Vaping Use    Vaping status: Never Used   Substance and Sexual Activity    Alcohol use: No    Drug use: No    Sexual activity: Yes   Other Topics Concern    Not on file   Social History Narrative    Not on file     Social Drivers of Health     Financial Resource Strain: Not on file   Food Insecurity: Not on file   Transportation Needs: Not on file   Physical Activity: Not on file   Stress: Not on file   Social Connections: Not on file   Intimate Partner Violence: Not on file   Housing Stability: Not on file       Current Outpatient Medications:     oxyCODONE (Roxicodone) 5 immediate release tablet, Take 1 tablet (5 mg total) by mouth every 4 (four) hours as needed for moderate pain Max Daily Amount: 30 mg, Disp: 10 tablet, Rfl: 0  Allergies   Allergen Reactions    Penicillins Hives     As an infant - \"lungs collapsed\" per mom     Vitals:    12/10/24 1315   BP: 124/72   Pulse: 73   Resp: 18   Temp: 97.5 °F (36.4 °C)   SpO2: 99%       Physical Exam   Constitutional: General appearance: The Patient is well-developed and well-nourished who appears the stated age in no acute distress. Patient is pleasant and talkative.     HEENT:  Normocephalic.  Sclerae are anicteric. Mucous membranes are moist. Neck is supple without adenopathy. No JVD.     Chest: The lungs are clear to auscultation.     Cardiac: Heart is regular rate.     Abdomen: Abdomen is soft, non-tender, non-distended and without masses.     Extremities: There is no clubbing or cyanosis. There is no edema.  Symmetric.  Neuro: Grossly nonfocal. Gait is normal.     Lymphatic: No evidence of cervical adenopathy bilaterally.   There is a palpable right axillary lymph node.  " No evidence of inguinal adenopathy bilaterally.     Skin: Warm, anicteric.    Psych:  Patient is pleasant and talkative.  Breasts:      Pathology:  [unfilled]    Labs:      Imaging  IR biopsy bone marrow  Result Date: 11/12/2024  Narrative: IMAGE-GUIDED BONE MARROW BIOPSY Indication: Lymphoma Procedure and Findings: The procedure was performed in the prone position. 1% lidocaine was used for local anesthetic and conscious sedation was administered. The right posterior inferior iliac spine was localized by CT and the low back was prepped and draped in sterile fashion. Using CT guidance, an 11g bone marrow needle was advanced through the cortex of the iliac spine into the marrow. Aspiration was performed and submitted to pathology for slide preparation.  Diagnostic marrow cells were identified preliminarily.  The needle was slightly withdrawn and redirected to obtain a core biopsy using the [employer] system. The core was submitted to pathology. The puncture site was cleansed and a dressing was applied. This examination, like all CT scans performed in the [facility], was performed utilizing techniques to minimize radiation dose exposure, including the use of iterative reconstruction and automated exposure control. Sedation (min) : 15 minutes     Impression: Impression: Technically successful image guided bone marrow aspiration and core biopsy Workstation performed: [id number]     I personally reviewed and interpreted the above laboratory and imaging data.

## 2024-12-13 NOTE — PRE-PROCEDURE INSTRUCTIONS
No outpatient medications have been marked as taking for the 12/18/24 encounter (Hospital Encounter).    Medication instructions for day surgery reviewed. Please use only a sip of water to take your instructed medications. Avoid all over the counter vitamins, supplements and NSAIDS for one week prior to surgery per anesthesia guidelines. Tylenol is ok to take as needed.     You will receive a call one business day prior to surgery with an arrival time and hospital directions. If your surgery is scheduled on a Monday, the hospital will be calling you on the Friday prior to your surgery. If you have not heard from anyone by 8pm, please call the hospital supervisor through the hospital  at [phone]. ([place] [phone] or [place] [phone]).    Do not eat or drink anything after midnight the night before your surgery, including candy, mints, lifesavers, or chewing gum. Do not drink alcohol 24hrs before your surgery. Try not to smoke at least 24hrs before your surgery.       Follow the pre surgery showering instructions as listed in the “My Surgical Experience Booklet” or otherwise provided by your surgeon's office. Do not use a blade to shave the surgical area 1 week before surgery. It is okay to use a clean electric clippers up to 24 hours before surgery. Do not apply any lotions, creams, including makeup, cologne, deodorant, or perfumes after showering on the day of your surgery. Do not use dry shampoo, hair spray, hair gel, or any type of hair products.     No contact lenses, eye make-up, or artificial eyelashes. Remove nail polish, including gel polish, and any artificial, gel, or acrylic nails if possible. Remove all jewelry including rings and body piercing jewelry.     Wear causal clothing that is easy to take on and off. Consider your type of surgery.    Keep any valuables, jewelry, piercings at home. Please bring any specially ordered equipment (sling, braces) if indicated.    Arrange for a  responsible person to drive you to and from the hospital on the day of your surgery. Please confirm the visitor policy for the day of your procedure when you receive your phone call with an arrival time.     Call the surgeon's office with any new illnesses, exposures, or additional questions prior to surgery.    Please reference your “My Surgical Experience Booklet” for additional information to prepare for your upcoming surgery.

## 2024-12-14 ENCOUNTER — APPOINTMENT (OUTPATIENT)
Dept: LAB | Facility: HOSPITAL | Age: 29
End: 2024-12-14
Payer: COMMERCIAL

## 2024-12-14 DIAGNOSIS — R59.1 LYMPHADENOPATHY: ICD-10-CM

## 2024-12-14 LAB
ALBUMIN SERPL BCG-MCNC: 4.7 G/DL (ref 3.5–5)
ALP SERPL-CCNC: 50 U/L (ref 34–104)
ALT SERPL W P-5'-P-CCNC: 19 U/L (ref 7–52)
ANION GAP SERPL CALCULATED.3IONS-SCNC: 6 MMOL/L (ref 4–13)
AST SERPL W P-5'-P-CCNC: 26 U/L (ref 13–39)
BASOPHILS # BLD AUTO: 0.06 THOUSANDS/ÂΜL (ref 0–0.1)
BASOPHILS NFR BLD AUTO: 1 % (ref 0–1)
BILIRUB SERPL-MCNC: 0.47 MG/DL (ref 0.2–1)
BUN SERPL-MCNC: 16 MG/DL (ref 5–25)
CALCIUM SERPL-MCNC: 9.6 MG/DL (ref 8.4–10.2)
CHLORIDE SERPL-SCNC: 104 MMOL/L (ref 96–108)
CO2 SERPL-SCNC: 29 MMOL/L (ref 21–32)
CREAT SERPL-MCNC: 1.05 MG/DL (ref 0.6–1.3)
EOSINOPHIL # BLD AUTO: 0.32 THOUSAND/ÂΜL (ref 0–0.61)
EOSINOPHIL NFR BLD AUTO: 4 % (ref 0–6)
ERYTHROCYTE [DISTWIDTH] IN BLOOD BY AUTOMATED COUNT: 12.4 % (ref 11.6–15.1)
GFR SERPL CREATININE-BSD FRML MDRD: 94 ML/MIN/1.73SQ M
GLUCOSE P FAST SERPL-MCNC: 72 MG/DL (ref 65–99)
HCT VFR BLD AUTO: 43.6 % (ref 36.5–49.3)
HGB BLD-MCNC: 14.6 G/DL (ref 12–17)
IMM GRANULOCYTES # BLD AUTO: 0.02 THOUSAND/UL (ref 0–0.2)
IMM GRANULOCYTES NFR BLD AUTO: 0 % (ref 0–2)
LYMPHOCYTES # BLD AUTO: 2.28 THOUSANDS/ÂΜL (ref 0.6–4.47)
LYMPHOCYTES NFR BLD AUTO: 32 % (ref 14–44)
MCH RBC QN AUTO: 29.3 PG (ref 26.8–34.3)
MCHC RBC AUTO-ENTMCNC: 33.5 G/DL (ref 31.4–37.4)
MCV RBC AUTO: 88 FL (ref 82–98)
MONOCYTES # BLD AUTO: 0.55 THOUSAND/ÂΜL (ref 0.17–1.22)
MONOCYTES NFR BLD AUTO: 8 % (ref 4–12)
NEUTROPHILS # BLD AUTO: 4 THOUSANDS/ÂΜL (ref 1.85–7.62)
NEUTS SEG NFR BLD AUTO: 55 % (ref 43–75)
NRBC BLD AUTO-RTO: 0 /100 WBCS
PLATELET # BLD AUTO: 104 THOUSANDS/UL (ref 149–390)
PMV BLD AUTO: 11.4 FL (ref 8.9–12.7)
POTASSIUM SERPL-SCNC: 4 MMOL/L (ref 3.5–5.3)
PROT SERPL-MCNC: 7.1 G/DL (ref 6.4–8.4)
RBC # BLD AUTO: 4.98 MILLION/UL (ref 3.88–5.62)
SODIUM SERPL-SCNC: 139 MMOL/L (ref 135–147)
WBC # BLD AUTO: 7.23 THOUSAND/UL (ref 4.31–10.16)

## 2024-12-14 PROCEDURE — 36415 COLL VENOUS BLD VENIPUNCTURE: CPT

## 2024-12-14 PROCEDURE — 80053 COMPREHEN METABOLIC PANEL: CPT

## 2024-12-14 PROCEDURE — 85025 COMPLETE CBC W/AUTO DIFF WBC: CPT

## 2024-12-17 ENCOUNTER — APPOINTMENT (OUTPATIENT)
Dept: LAB | Facility: HOSPITAL | Age: 29
End: 2024-12-17
Payer: COMMERCIAL

## 2024-12-17 ENCOUNTER — OFFICE VISIT (OUTPATIENT)
Dept: LAB | Facility: HOSPITAL | Age: 29
End: 2024-12-17
Payer: COMMERCIAL

## 2024-12-17 ENCOUNTER — ANESTHESIA EVENT (OUTPATIENT)
Dept: PERIOP | Facility: HOSPITAL | Age: 29
End: 2024-12-17
Payer: COMMERCIAL

## 2024-12-17 DIAGNOSIS — R59.1 LYMPHADENOPATHY: ICD-10-CM

## 2024-12-17 LAB
ATRIAL RATE: 79 BPM
P AXIS: 43 DEGREES
PR INTERVAL: 100 MS
QRS AXIS: 66 DEGREES
QRSD INTERVAL: 70 MS
QT INTERVAL: 366 MS
QTC INTERVAL: 420 MS
T WAVE AXIS: 17 DEGREES
VENTRICULAR RATE: 79 BPM

## 2024-12-17 PROCEDURE — 93010 ELECTROCARDIOGRAM REPORT: CPT | Performed by: STUDENT IN AN ORGANIZED HEALTH CARE EDUCATION/TRAINING PROGRAM

## 2024-12-17 PROCEDURE — 93005 ELECTROCARDIOGRAM TRACING: CPT

## 2024-12-18 ENCOUNTER — HOSPITAL ENCOUNTER (OUTPATIENT)
Facility: HOSPITAL | Age: 29
Setting detail: OUTPATIENT SURGERY
Discharge: HOME | End: 2024-12-18
Attending: SURGERY | Admitting: SURGERY
Payer: COMMERCIAL

## 2024-12-18 ENCOUNTER — ANESTHESIA (OUTPATIENT)
Dept: PERIOP | Facility: HOSPITAL | Age: 29
End: 2024-12-18
Payer: COMMERCIAL

## 2024-12-18 VITALS
DIASTOLIC BLOOD PRESSURE: 80 MMHG | RESPIRATION RATE: 16 BRPM | TEMPERATURE: 98 F | WEIGHT: 205 LBS | BODY MASS INDEX: 27.17 KG/M2 | SYSTOLIC BLOOD PRESSURE: 139 MMHG | HEIGHT: 73 IN | HEART RATE: 66 BPM | OXYGEN SATURATION: 100 %

## 2024-12-18 DIAGNOSIS — C81.08 NODULAR LYMPHOCYTE PREDOM HODGKIN LYMPHOMA, NODES MULT SITE (HCC): ICD-10-CM

## 2024-12-18 DIAGNOSIS — R59.1 LYMPHADENOPATHY: ICD-10-CM

## 2024-12-18 PROCEDURE — 88184 FLOWCYTOMETRY/ TC 1 MARKER: CPT | Performed by: SURGERY

## 2024-12-18 PROCEDURE — 88185 FLOWCYTOMETRY/TC ADD-ON: CPT | Performed by: SURGERY

## 2024-12-18 RX ORDER — DEXAMETHASONE SODIUM PHOSPHATE 10 MG/ML
INJECTION, SOLUTION INTRAMUSCULAR; INTRAVENOUS AS NEEDED
Status: DISCONTINUED | OUTPATIENT
Start: 2024-12-18 | End: 2024-12-18

## 2024-12-18 RX ORDER — OXYCODONE HYDROCHLORIDE 5 MG/1
2.5 TABLET ORAL EVERY 4 HOURS PRN
Refills: 0 | Status: DISCONTINUED | OUTPATIENT
Start: 2024-12-18 | End: 2024-12-18 | Stop reason: HOSPADM

## 2024-12-18 RX ORDER — LIDOCAINE HYDROCHLORIDE 10 MG/ML
INJECTION, SOLUTION EPIDURAL; INFILTRATION; INTRACAUDAL; PERINEURAL AS NEEDED
Status: DISCONTINUED | OUTPATIENT
Start: 2024-12-18 | End: 2024-12-18

## 2024-12-18 RX ORDER — FENTANYL CITRATE 50 UG/ML
INJECTION, SOLUTION INTRAMUSCULAR; INTRAVENOUS AS NEEDED
Status: DISCONTINUED | OUTPATIENT
Start: 2024-12-18 | End: 2024-12-18

## 2024-12-18 RX ORDER — SODIUM CHLORIDE, SODIUM LACTATE, POTASSIUM CHLORIDE, CALCIUM CHLORIDE 600; 310; 30; 20 MG/100ML; MG/100ML; MG/100ML; MG/100ML
125 INJECTION, SOLUTION INTRAVENOUS CONTINUOUS
Status: DISCONTINUED | OUTPATIENT
Start: 2024-12-18 | End: 2024-12-18 | Stop reason: HOSPADM

## 2024-12-18 RX ORDER — SODIUM CHLORIDE, SODIUM LACTATE, POTASSIUM CHLORIDE, CALCIUM CHLORIDE 600; 310; 30; 20 MG/100ML; MG/100ML; MG/100ML; MG/100ML
50 INJECTION, SOLUTION INTRAVENOUS CONTINUOUS
Status: DISCONTINUED | OUTPATIENT
Start: 2024-12-18 | End: 2024-12-18 | Stop reason: HOSPADM

## 2024-12-18 RX ORDER — CLINDAMYCIN PHOSPHATE 900 MG/50ML
900 INJECTION, SOLUTION INTRAVENOUS ONCE
Status: COMPLETED | OUTPATIENT
Start: 2024-12-18 | End: 2024-12-18

## 2024-12-18 RX ORDER — IBUPROFEN 400 MG/1
400 TABLET, FILM COATED ORAL EVERY 6 HOURS PRN
Status: DISCONTINUED | OUTPATIENT
Start: 2024-12-18 | End: 2024-12-18 | Stop reason: HOSPADM

## 2024-12-18 RX ORDER — ONDANSETRON 2 MG/ML
INJECTION INTRAMUSCULAR; INTRAVENOUS AS NEEDED
Status: DISCONTINUED | OUTPATIENT
Start: 2024-12-18 | End: 2024-12-18

## 2024-12-18 RX ORDER — FENTANYL CITRATE/PF 50 MCG/ML
50 SYRINGE (ML) INJECTION
Status: DISCONTINUED | OUTPATIENT
Start: 2024-12-18 | End: 2024-12-18 | Stop reason: HOSPADM

## 2024-12-18 RX ORDER — BUPIVACAINE HYDROCHLORIDE 2.5 MG/ML
INJECTION, SOLUTION EPIDURAL; INFILTRATION; INTRACAUDAL AS NEEDED
Status: DISCONTINUED | OUTPATIENT
Start: 2024-12-18 | End: 2024-12-18 | Stop reason: HOSPADM

## 2024-12-18 RX ORDER — PROPOFOL 10 MG/ML
INJECTION, EMULSION INTRAVENOUS AS NEEDED
Status: DISCONTINUED | OUTPATIENT
Start: 2024-12-18 | End: 2024-12-18

## 2024-12-18 RX ORDER — ONDANSETRON 2 MG/ML
4 INJECTION INTRAMUSCULAR; INTRAVENOUS ONCE AS NEEDED
Status: DISCONTINUED | OUTPATIENT
Start: 2024-12-18 | End: 2024-12-18 | Stop reason: HOSPADM

## 2024-12-18 RX ORDER — MIDAZOLAM HYDROCHLORIDE 2 MG/2ML
INJECTION, SOLUTION INTRAMUSCULAR; INTRAVENOUS AS NEEDED
Status: DISCONTINUED | OUTPATIENT
Start: 2024-12-18 | End: 2024-12-18

## 2024-12-18 RX ORDER — KETOROLAC TROMETHAMINE 30 MG/ML
INJECTION, SOLUTION INTRAMUSCULAR; INTRAVENOUS AS NEEDED
Status: DISCONTINUED | OUTPATIENT
Start: 2024-12-18 | End: 2024-12-18

## 2024-12-18 RX ORDER — ACETAMINOPHEN 325 MG/1
975 TABLET ORAL EVERY 6 HOURS PRN
Status: DISCONTINUED | OUTPATIENT
Start: 2024-12-18 | End: 2024-12-18 | Stop reason: HOSPADM

## 2024-12-18 RX ADMIN — KETOROLAC TROMETHAMINE 15 MG: 30 INJECTION, SOLUTION INTRAMUSCULAR; INTRAVENOUS at 12:00

## 2024-12-18 RX ADMIN — CLINDAMYCIN PHOSPHATE 900 MG: 900 INJECTION, SOLUTION INTRAVENOUS at 11:36

## 2024-12-18 RX ADMIN — FENTANYL CITRATE 75 MCG: 50 INJECTION INTRAMUSCULAR; INTRAVENOUS at 11:52

## 2024-12-18 RX ADMIN — PROPOFOL 330 MG: 10 INJECTION, EMULSION INTRAVENOUS at 11:41

## 2024-12-18 RX ADMIN — ONDANSETRON 4 MG: 2 INJECTION INTRAMUSCULAR; INTRAVENOUS at 11:41

## 2024-12-18 RX ADMIN — FENTANYL CITRATE 25 MCG: 50 INJECTION INTRAMUSCULAR; INTRAVENOUS at 11:41

## 2024-12-18 RX ADMIN — LIDOCAINE HYDROCHLORIDE 50 MG: 10 INJECTION, SOLUTION EPIDURAL; INFILTRATION; INTRACAUDAL; PERINEURAL at 11:41

## 2024-12-18 RX ADMIN — DEXAMETHASONE SODIUM PHOSPHATE 10 MG: 10 INJECTION, SOLUTION INTRAMUSCULAR; INTRAVENOUS at 11:41

## 2024-12-18 RX ADMIN — MIDAZOLAM 2 MG: 1 INJECTION INTRAMUSCULAR; INTRAVENOUS at 11:35

## 2024-12-18 RX ADMIN — SODIUM CHLORIDE, SODIUM LACTATE, POTASSIUM CHLORIDE, AND CALCIUM CHLORIDE 125 ML/HR: .6; .31; .03; .02 INJECTION, SOLUTION INTRAVENOUS at 10:04

## 2024-12-18 NOTE — ANESTHESIA POSTPROCEDURE EVALUATION
Post-Op Assessment Note    Last Filed PACU Vitals:  Vitals Value Taken Time   Temp 97.7 °F (36.5 °C) 12/18/24 1245   Pulse 69 12/18/24 1253   /74 12/18/24 1245   Resp 19 12/18/24 1253   SpO2 98 % 12/18/24 1253   Vitals shown include unfiled device data.    Modified Tanvir:  Activity: 2 (12/18/2024 12:45 PM)  Respiration: 2 (12/18/2024 12:45 PM)  Circulation: 2 (12/18/2024 12:45 PM)  Consciousness: 2 (12/18/2024 12:45 PM)  Oxygen Saturation: 2 (12/18/2024 12:45 PM)  Modified Tanvir Score: 10 (12/18/2024 12:45 PM)

## 2024-12-18 NOTE — ANESTHESIA PREPROCEDURE EVALUATION
Review of Systems/Medical History  Patient summary reviewed.    No history of anesthetic complications     Cardiovascular  Negative cardio ROS Exercise tolerance (METS): good.     Pulmonary  Negative pulmonary ROS        GI/Hepatic            Endo/Other  Negative endo/other ROS       GYN       Hematology   Musculoskeletal       Neurology   Psychology             Physical Exam    Airway    Mallampati score: II  TM Distance: >3 FB  Neck ROM: full     Dental   No notable dental hx     Cardiovascular  Comment: Negative ROS, Cardiovascular exam normal    Pulmonary  Pulmonary exam normal     Other Findings        Anesthesia Plan  ASA Score- 2     Anesthesia Type- general with ASA Monitors.         Additional Monitors:     Airway Plan: LMA.           Plan Factors-Exercise tolerance (METS): good.        Patient summary reviewed.                  Induction- intravenous.    Postoperative Plan-         Informed Consent- Anesthetic plan and risks discussed with patient, father and mother.

## 2024-12-18 NOTE — INTERVAL H&P NOTE
H&P reviewed. After examining the patient I find no changes in the patients condition since the H&P had been written.    Vitals:    12/18/24 0945   BP: 151/84   Pulse: 79   Resp: 20   Temp: 98.6 °F (37 °C)   SpO2: 98%

## 2024-12-18 NOTE — DISCHARGE INSTR - AVS FIRST PAGE
POST-OPERATIVE WOUND CARE INSTRUCTIONS    Your wound is closed with:   Dissolvable sutures/glue       Wound care:   Leave glue in place   You may shower using soap and water to clean your wound. Gently pat it dry.    You may redress your wound for comfort as needed.    Activity:   Did not perform any heavy lifting or strenuous physical activity for 7 days.   Your activity restrictions will be reevaluated at your postoperative visit.    Medications:   You may resume all your preoperative medications and diet.   Pain medication as directed on the prescription given in the office.    Other:   May use ice on the wound for 24-48 hours as needed for comfort.    May place warm compresses to the axilla after 48 hours for comfort.         Call the office at [phone] if you have any of the followin. Redness, swelling, heat, unusual drainage or heavy bleeding from the wound.   2. Fever greater than 101°F.   3. Pain not relieved by the prescribed pain medication

## 2024-12-18 NOTE — OP NOTE
OPERATIVE REPORT  PATIENT NAME: Jaime Schofield Jr.    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  Patient was placed supine on the operating room table and general anesthesia was induced.  The right axilla was prepped and draped in the usual sterile fashion.  Timeout was performed and all parties were in agreement.  Curvilinear skin incision was made in the right axilla through previous scar using 15 blade scalpel.  Dissection was carried down through dermis and subcutaneous tissues using Bovie electrocautery.  The clavipectoral fascia was incised using Bovie electrocautery.  Immediately upon entering the axilla a packet of lymph nodes was evident.  These were dissected free from surrounding tissues using Bovie electrocautery.  All feeding lymphatics and vessels were clamped using hemostats and tied using  2-0 Vicryl ties.  The lymph node packet was then passed off the field and sent fresh for lymphoma protocol.  The wound was copiously irrigated with normal saline and hemostasis was ensured using Bovie electrocautery.  The clavipectoral fascia was closed using interrupted 2-0 Vicryl suture.  The deep dermal layer was closed using interrupted 3-0 Vicryl suture.  The skin was closed using 4 Monocryl running subcuticular suture.  The skin was washed and dried and sterile Exofin was applied.  The patient was then awakened in the operating room and returned to the PACU in stable condition having tolerated the procedure well.  Dr. Calloway was present for the entire procedure.    Patient Disposition:  PACU     This procedure was not performed to treat cancer              SIGNATURE: Jimbo Mueller MD  DATE: December 18, 2024  TIME: 12:18 PM

## 2024-12-20 LAB — SCAN RESULT: NORMAL

## 2024-12-23 ENCOUNTER — TELEPHONE (OUTPATIENT)
Age: 29
End: 2024-12-23

## 2024-12-23 NOTE — TELEPHONE ENCOUNTER
Attempted to contact patient regarding post op call.  Left voicemail message with call back number.

## 2025-01-06 ENCOUNTER — TELEMEDICINE (OUTPATIENT)
Dept: HEMATOLOGY ONCOLOGY | Facility: CLINIC | Age: 30
End: 2025-01-06
Payer: COMMERCIAL

## 2025-01-06 DIAGNOSIS — C81.08 NODULAR LYMPHOCYTE PREDOM HODGKIN LYMPHOMA, NODES MULT SITE (HCC): Primary | ICD-10-CM

## 2025-01-06 PROCEDURE — 99213 OFFICE O/P EST LOW 20 MIN: CPT | Performed by: INTERNAL MEDICINE

## 2025-01-07 ENCOUNTER — OFFICE VISIT (OUTPATIENT)
Dept: SURGICAL ONCOLOGY | Facility: CLINIC | Age: 30
End: 2025-01-07

## 2025-01-07 VITALS
SYSTOLIC BLOOD PRESSURE: 128 MMHG | HEART RATE: 76 BPM | RESPIRATION RATE: 16 BRPM | DIASTOLIC BLOOD PRESSURE: 76 MMHG | BODY MASS INDEX: 28.89 KG/M2 | OXYGEN SATURATION: 99 % | WEIGHT: 218 LBS | TEMPERATURE: 99.1 F | HEIGHT: 73 IN

## 2025-01-07 DIAGNOSIS — R59.1 LYMPHADENOPATHY: Primary | ICD-10-CM

## 2025-01-07 PROCEDURE — 99024 POSTOP FOLLOW-UP VISIT: CPT | Performed by: SURGERY

## 2025-01-07 NOTE — ASSESSMENT & PLAN NOTE
29-year-old male with a history of Hodgkin's lymphoma.  He underwent excisional biopsy that was benign.  He will follow-up with medical oncology.  I will see him again in the future should the need arise.  He is agreeable to this plan. All his questions were answered.

## 2025-01-07 NOTE — LETTER
January 7, 2025     Shaquille Menchaca MD  [address]  [facility] [address]    Patient: Jaime Schofield Jr.   YOB: 1995   Date of Visit: 1/7/2025       Dear Dr. Menchaca:    Thank you for referring Jaime Schofield to me for evaluation. Below are my notes for this consultation.    If you have questions, please do not hesitate to call me. I look forward to following your patient along with you.         Sincerely,        Fabian Calloway MD        CC: DO Fabian Desir MD  1/7/2025  2:15 PM  Sign when Signing Visit               Surgical Oncology Follow Up       [facility] SUR[facility] [address] [address]    aJime Schofield Jr.  1995  [id number]  [facility]COL[facility] [place]  [address]  [place] [address]    1. Lymphadenopathy  Assessment & Plan:  29-year-old male with a history of Hodgkin's lymphoma.  He underwent excisional biopsy that was benign.  He will follow-up with medical oncology.  I will see him again in the future should the need arise.  He is agreeable to this plan. All his questions were answered.          Chief Complaint   Patient presents with   • Post-op       No follow-ups on file.      Oncology History   Nodular lymphocyte predom Hodgkin lymphoma, nodes mult site ([facility])   2/21/2012 Initial Diagnosis    February 21, 2012 patient was found to have nodular lymphocyte predominant Hodgkin's lymphoma, bilateral axillary nodes.  He was treated with Rituxan weekly x4 followed by every 2 month Rituxan ending March 2014.  September 2016 patient had recurrence of axillary lymphadenopathy.  Biopsy showed no convincing evidence of recurrent disease.  Patient was restarted on Rituxan every 3 months February 2017- November 2018.  He was lost to follow-up thereafter.  Most recent imaging August 2018 CT abdomen pelvis showed no evidence of lymphadenopathy.  Chest  was planned but not accomplished due to insurance discontinuation.     2/21/2012 -  Cancer Staged    Staging form: Hodgkin and Non-Hodgkin Lymphoma, AJCC 8th Edition  - Clinical stage from 2/21/2012: Stage II (Hodgkin lymphoma, A - Asymptomatic) - Signed by Stanley Cortez DO on 4/18/2024  Stage prefix: Initial diagnosis               History of Present Illness: Patient returns after his lymph node biopsy.  He is doing well.  No seroma.  No fevers or chills.  No drainage.    Review of Systems  Complete ROS Surg Onc:   Complete ROS Surg Onc:   Constitutional: The patient denies new or recent history of general fatigue, no recent weight loss, no change in appetite.   Eyes: No complaints of visual problems, no scleral icterus.   ENT: no complaints of ear pain, no hoarseness, no difficulty swallowing,  no tinnitus and no new masses in head, oral cavity, or neck.   Cardiovascular: No complaints of chest pain, no palpitations, no ankle edema.   Respiratory: No complaints of shortness of breath, no cough.   Gastrointestinal: No complaints of jaundice, no bloody stools, no pale stools.   Genitourinary: No complaints of dysuria, no hematuria, no nocturia, no frequent urination, no urethral discharge.   Musculoskeletal: No complaints of weakness, paralysis, joint stiffness or arthralgias.  Integumentary: No complaints of rash, no new lesions.   Neurological: No complaints of convulsions, no seizures, no dizziness.   Hematologic/Lymphatic: No complaints of easy bruising.   Endocrine:  No hot or cold intolerance.  No polydipsia, polyphagia, or polyuria.  Allergy/immunology:  No environmental allergies.  No food allergies.  Not immunocompromised.  Skin:  No pallor or rash.  No wound.        Patient Active Problem List   Diagnosis   • Thrombocytopenia (HCC)   • Nodular lymphocyte predom Hodgkin lymphoma, nodes mult site (HCC)   • Allergic rhinitis   • Breast lump   • Urinary urgency   • Dysphagia   • Constipation   • Rectal  bleeding   • Blood in stool   • Gynecomastia   • Urethral straddle injury   • Elevation of levels of liver transaminase levels   • Lymphadenopathy     Past Medical History:   Diagnosis Date   • Cancer (HCC)     hodgkin's lymphoma   • ITP (idiopathic thrombocytopenic purpura)    • Lymphoma (HCC)      Past Surgical History:   Procedure Laterality Date   • HERNIA REPAIR      umbilical - at 6 months of age   • IR BIOPSY BONE MARROW  11/12/2024   • KY ARTHRS KNEE W/MENISCECTOMY MED&LAT W/SHAVING Left 1/25/2023    Procedure: LEFT knee arthroscopy, partial medial meniscectomy;  Surgeon: Kike Watt DO;  Location: [facility];  Service: Orthopedics   • [place] BX/EXC LYMPH NODE OPEN DEEP AXILLARY NODE Left 9/26/2016    Procedure: AXILLARY LYMPH NODE BIOPSY;  Surgeon: Fabian Calloway MD;  Location: [facility];  Service: Surgical Oncology   • [place] BX/EXC LYMPH NODE OPEN DEEP AXILLARY NODE Right 12/18/2024    Procedure: RIGHT AXILLARY LYMPH NODE EXCISION, LYMPHOMA PROTOCOL;  Surgeon: Fabian Calloway MD;  Location: [facility];  Service: Surgical Oncology   • SENTINEL LYMPH NODE BIOPSY Right      Family History   Problem Relation Age of Onset   • Breast cancer Maternal Aunt    • Breast cancer Maternal Grandmother    • Hodgkin's lymphoma Paternal Grandfather      Social History     Socioeconomic History   • Marital status: Single     Spouse name: Not on file   • Number of children: Not on file   • Years of education: Not on file   • Highest education level: Not on file   Occupational History   • Not on file   Tobacco Use   • Smoking status: Never   • Smokeless tobacco: Never   Vaping Use   • Vaping status: Never Used   Substance and Sexual Activity   • Alcohol use: No   • Drug use: No   • Sexual activity: Yes   Other Topics Concern   • Not on file   Social History Narrative   • Not on file     Social Drivers of Health     Financial Resource Strain: Not on file   Food Insecurity: Not on file   Transportation Needs: Not on file  "  Physical Activity: Not on file   Stress: Not on file   Social Connections: Not on file   Intimate Partner Violence: Not on file   Housing Stability: Not on file       Current Outpatient Medications:   •  oxyCODONE (Roxicodone) 5 immediate release tablet, Take 1 tablet (5 mg total) by mouth every 4 (four) hours as needed for moderate pain Max Daily Amount: 30 mg (Patient not taking: Reported on 1/7/2025), Disp: 10 tablet, Rfl: 0  Allergies   Allergen Reactions   • Penicillins Hives     As an infant - \"lungs collapsed\" per mom     Vitals:    01/07/25 1355   BP: 128/76   Pulse: 76   Resp: 16   Temp: 99.1 °F (37.3 °C)   SpO2: 99%       Physical Exam  Constitutional: General appearance: The Patient is well-developed and well-nourished who appears the stated age in no acute distress. Patient is pleasant and talkative.     HEENT:  Normocephalic.  Sclerae are anicteric. Mucous membranes are moist.   Extremities: There is no clubbing or cyanosis. There is no edema.  Symmetric.  Neuro: Grossly nonfocal. Gait is normal.      Skin: Warm, anicteric.  Incision is C/D/.  No seroma.  Psych:  Patient is pleasant and talkative.  Breasts:        Pathology:  Final Diagnosis   LYMPH NODE, RIGHT AXILLARY, EXCISIONAL BIOPSY: ATYPICAL LYMPH NODE; FOLLICULAR AND PARACORTICAL HYPERPLASIA WITH SOME CHANGES OF DERMATOPATHIC LYMPHADENITIS; SEE IMPRESSION AND COMMENT      IMPRESSION:  The reported history of nodular lymphocyte-predominant B-cell lymphoma (NLPBL) / nodular lymphocyte-predominant Hodgkin lymphoma (NLPHL) with recent imaging concerning for relapsed disease or other lymphoproliferative disorder is noted, including lymph nodes with SUV of 15.0 and 21.0.     The overall findings are of an atypical lymph node with follicular and paracortical hyperplasia and some changes associated with dermatopathic lymphadenopathy; there are scattered intermediate size atypical lymphocytes detected by immunohistochemistry but insufficient to " conclusively call malignant. If PET-avid lymphadenopathy persists and / or there is persistent clinical concern for lymphoma, additional tissue sampling from the most PET-avid lymph node is recommended, as clinically indicated.      The large and atypical germinal centers are nonspecific but are often seen in infectious etiologies, particularly recent or chronic viral infections (EBV, CMV, HIV, etc.). Recommend extensive skin exam and / or dermatology consultation if there is no clinical explanation for dermatopathic change, as clinically indicated.      Reviewed with agreement in intradepartmental consensus. Results communicated to Dr. TORIE Cortez by Dr. SHANTI Roberts via Epic Secure Chat on 01/06/2024, at 1608. See comment for microscopic description and ancillary testing.         COMMENT:  H&E stained sections show lymph node tissue with relatively intact lymph node architecture, including expanded paracortical hyperplasia as well as follicular hyperplasia with large, irregular follicles and germinal centers. There are hyperplastic nodules in the paracortex composed predominantly of increased interdigitating dendritic cells, Langerhans cells and occasional pigment-laden macrophages. By cytomorphology, the lymphocytes are predominantly small and mature, with scant cytoplasm, round to slightly irregular nuclear contours and condensed chromatin with inconspicuous nucleoli. There are scattered and increased intermediate to larger cells with variable amounts of cytoplasm, round to irregular nuclear contours, open chromatin and prominent nucleoli, most compatible with immunoblasts. There are no definitive granulomata, areas of necrosis or overt viral cytopathic changes.     By immunohistochemistry, CD3 shows that the paracortical expansion is composed predominantly of T-cells. CD20 (blocks A1, A2 and A3) highlights B-cells within the follicular hyperplasia, as well as scattered cell throughout the paracortical areas, which  appear intermediate in size, and which coexpress OCT2 and BOB1. CD10 and BCL6 highlight germinal centers. BCL2 highlights B-cells, T-cells and plasma cells and is appropriately negative within germinal centers. FOXP1 is largely negative within germinal centers. IgD shows no expansion of mantle zones. BCL1 is negative within lymphocytes. CD21 and CD23 highlight intact and disrupted follicular dendritic cell meshworks. CD43 highlights T-cells and plasma cells. CD30 highlights increased immunoblasts, which are predominantly seen at the periphery of germinal centers, as well as a subset of interdigitating dendritic cells and Langerhans cells. MUM-1 highlights increased interdigitating dendritic cells and Langerhans cells, as well as scattered plasma cells. c-MYC highlights scattered cells with a single focus of loose clustering. EBV encoded RNA in situ hybridization (LELE APOLINAR) is negative. The Ki-67 proliferation index is overall low (<10%) within background lymphocytes, and is appropriately higher within polarized germinal centers. [place] highlights melanin pigment deposition. All stains are performed with appropriate controls.         FLOW CYTOMETRY ANALYSIS: [employer] #[id number]; see separate report      Preliminary result electronically signed by Ricco Roberts MD on 1/6/2025 at 1613 EST  Preliminary result electronically signed by Ricco Roberts MD on 12/23/2024 at 1829 EST       Labs:      Imaging  No results found.  I personally reviewed and interpreted the above laboratory and imaging data.

## 2025-01-07 NOTE — PROGRESS NOTES
Surgical Oncology Follow Up       [facility] [place]  [address]  [place] [address]    Jaime Schofield Jr.  1995  [id number]  [facility] [place]  [address]  [place] [address]    1. Lymphadenopathy  Assessment & Plan:  29-year-old male with a history of Hodgkin's lymphoma.  He underwent excisional biopsy that was benign.  He will follow-up with medical oncology.  I will see him again in the future should the need arise.  He is agreeable to this plan. All his questions were answered.          Chief Complaint   Patient presents with    Post-op       No follow-ups on file.      Oncology History   Nodular lymphocyte predom Hodgkin lymphoma, nodes mult site ([facility])   2/21/2012 Initial Diagnosis    February 21, 2012 patient was found to have nodular lymphocyte predominant Hodgkin's lymphoma, bilateral axillary nodes.  He was treated with Rituxan weekly x4 followed by every 2 month Rituxan ending March 2014.  September 2016 patient had recurrence of axillary lymphadenopathy.  Biopsy showed no convincing evidence of recurrent disease.  Patient was restarted on Rituxan every 3 months February 2017- November 2018.  He was lost to follow-up thereafter.  Most recent imaging August 2018 CT abdomen pelvis showed no evidence of lymphadenopathy.  Chest was planned but not accomplished due to insurance discontinuation.     2/21/2012 -  Cancer Staged    Staging form: Hodgkin and Non-Hodgkin Lymphoma, AJCC 8th Edition  - Clinical stage from 2/21/2012: Stage II (Hodgkin lymphoma, A - Asymptomatic) - Signed by Stanley Cortez DO on 4/18/2024  Stage prefix: Initial diagnosis               History of Present Illness: Patient returns after his lymph node biopsy.  He is doing well.  No seroma.  No fevers or chills.  No drainage.    Review of Systems  Complete ROS Surg Onc:   Complete ROS Surg  Onc:   Constitutional: The patient denies new or recent history of general fatigue, no recent weight loss, no change in appetite.   Eyes: No complaints of visual problems, no scleral icterus.   ENT: no complaints of ear pain, no hoarseness, no difficulty swallowing,  no tinnitus and no new masses in head, oral cavity, or neck.   Cardiovascular: No complaints of chest pain, no palpitations, no ankle edema.   Respiratory: No complaints of shortness of breath, no cough.   Gastrointestinal: No complaints of jaundice, no bloody stools, no pale stools.   Genitourinary: No complaints of dysuria, no hematuria, no nocturia, no frequent urination, no urethral discharge.   Musculoskeletal: No complaints of weakness, paralysis, joint stiffness or arthralgias.  Integumentary: No complaints of rash, no new lesions.   Neurological: No complaints of convulsions, no seizures, no dizziness.   Hematologic/Lymphatic: No complaints of easy bruising.   Endocrine:  No hot or cold intolerance.  No polydipsia, polyphagia, or polyuria.  Allergy/immunology:  No environmental allergies.  No food allergies.  Not immunocompromised.  Skin:  No pallor or rash.  No wound.        Patient Active Problem List   Diagnosis    Thrombocytopenia (HCC)    Nodular lymphocyte predom Hodgkin lymphoma, nodes mult site (HCC)    Allergic rhinitis    Breast lump    Urinary urgency    Dysphagia    Constipation    Rectal bleeding    Blood in stool    Gynecomastia    Urethral straddle injury    Elevation of levels of liver transaminase levels    Lymphadenopathy     Past Medical History:   Diagnosis Date    Cancer (HCC)     hodgkin's lymphoma    ITP (idiopathic thrombocytopenic purpura)     Lymphoma (HCC)      Past Surgical History:   Procedure Laterality Date    HERNIA REPAIR      umbilical - at 6 months of age    IR BIOPSY BONE MARROW  11/12/2024    RI ARTHRS KNEE W/MENISCECTOMY MED&LAT W/SHAVING Left 1/25/2023    Procedure: LEFT knee arthroscopy, partial medial  "meniscectomy;  Surgeon: Kike Watt DO;  Location: [facility];  Service: Orthopedics    NY BX/EXC LYMPH NODE OPEN DEEP AXILLARY NODE Left 9/26/2016    Procedure: AXILLARY LYMPH NODE BIOPSY;  Surgeon: Fabian Calloway MD;  Location: [facility];  Service: Surgical Oncology    NY BX/EXC LYMPH NODE OPEN DEEP AXILLARY NODE Right 12/18/2024    Procedure: RIGHT AXILLARY LYMPH NODE EXCISION, LYMPHOMA PROTOCOL;  Surgeon: Fabian Calloway MD;  Location: [facility];  Service: Surgical Oncology    SENTINEL LYMPH NODE BIOPSY Right      Family History   Problem Relation Age of Onset    Breast cancer Maternal Aunt     Breast cancer Maternal Grandmother     Hodgkin's lymphoma Paternal Grandfather      Social History     Socioeconomic History    Marital status: Single     Spouse name: Not on file    Number of children: Not on file    Years of education: Not on file    Highest education level: Not on file   Occupational History    Not on file   Tobacco Use    Smoking status: Never    Smokeless tobacco: Never   Vaping Use    Vaping status: Never Used   Substance and Sexual Activity    Alcohol use: No    Drug use: No    Sexual activity: Yes   Other Topics Concern    Not on file   Social History Narrative    Not on file     Social Drivers of Health     Financial Resource Strain: Not on file   Food Insecurity: Not on file   Transportation Needs: Not on file   Physical Activity: Not on file   Stress: Not on file   Social Connections: Not on file   Intimate Partner Violence: Not on file   Housing Stability: Not on file       Current Outpatient Medications:     oxyCODONE (Roxicodone) 5 immediate release tablet, Take 1 tablet (5 mg total) by mouth every 4 (four) hours as needed for moderate pain Max Daily Amount: 30 mg (Patient not taking: Reported on 1/7/2025), Disp: 10 tablet, Rfl: 0  Allergies   Allergen Reactions    Penicillins Hives     As an infant - \"lungs collapsed\" per mom     Vitals:    01/07/25 1355   BP: 128/76   Pulse: 76 "   Resp: 16   Temp: 99.1 °F (37.3 °C)   SpO2: 99%       Physical Exam  Constitutional: General appearance: The Patient is well-developed and well-nourished who appears the stated age in no acute distress. Patient is pleasant and talkative.     HEENT:  Normocephalic.  Sclerae are anicteric. Mucous membranes are moist.   Extremities: There is no clubbing or cyanosis. There is no edema.  Symmetric.  Neuro: Grossly nonfocal. Gait is normal.      Skin: Warm, anicteric.  Incision is C/D/.  No seroma.  Psych:  Patient is pleasant and talkative.  Breasts:        Pathology:  Final Diagnosis   LYMPH NODE, RIGHT AXILLARY, EXCISIONAL BIOPSY: ATYPICAL LYMPH NODE; FOLLICULAR AND PARACORTICAL HYPERPLASIA WITH SOME CHANGES OF DERMATOPATHIC LYMPHADENITIS; SEE IMPRESSION AND COMMENT      IMPRESSION:  The reported history of nodular lymphocyte-predominant B-cell lymphoma (NLPBL) / nodular lymphocyte-predominant Hodgkin lymphoma (NLPHL) with recent imaging concerning for relapsed disease or other lymphoproliferative disorder is noted, including lymph nodes with SUV of 15.0 and 21.0.     The overall findings are of an atypical lymph node with follicular and paracortical hyperplasia and some changes associated with dermatopathic lymphadenopathy; there are scattered intermediate size atypical lymphocytes detected by immunohistochemistry but insufficient to conclusively call malignant. If PET-avid lymphadenopathy persists and / or there is persistent clinical concern for lymphoma, additional tissue sampling from the most PET-avid lymph node is recommended, as clinically indicated.      The large and atypical germinal centers are nonspecific but are often seen in infectious etiologies, particularly recent or chronic viral infections (EBV, CMV, HIV, etc.). Recommend extensive skin exam and / or dermatology consultation if there is no clinical explanation for dermatopathic change, as clinically indicated.      Reviewed with agreement in  intradepartmental consensus. Results communicated to Dr. TORIE Cortez by Dr. SHANTI Roberts via Epic Secure Chat on 01/06/2024, at 1608. See comment for microscopic description and ancillary testing.         COMMENT:  H&E stained sections show lymph node tissue with relatively intact lymph node architecture, including expanded paracortical hyperplasia as well as follicular hyperplasia with large, irregular follicles and germinal centers. There are hyperplastic nodules in the paracortex composed predominantly of increased interdigitating dendritic cells, Langerhans cells and occasional pigment-laden macrophages. By cytomorphology, the lymphocytes are predominantly small and mature, with scant cytoplasm, round to slightly irregular nuclear contours and condensed chromatin with inconspicuous nucleoli. There are scattered and increased intermediate to larger cells with variable amounts of cytoplasm, round to irregular nuclear contours, open chromatin and prominent nucleoli, most compatible with immunoblasts. There are no definitive granulomata, areas of necrosis or overt viral cytopathic changes.     By immunohistochemistry, CD3 shows that the paracortical expansion is composed predominantly of T-cells. CD20 (blocks A1, A2 and A3) highlights B-cells within the follicular hyperplasia, as well as scattered cell throughout the paracortical areas, which appear intermediate in size, and which coexpress OCT2 and BOB1. CD10 and BCL6 highlight germinal centers. BCL2 highlights B-cells, T-cells and plasma cells and is appropriately negative within germinal centers. FOXP1 is largely negative within germinal centers. IgD shows no expansion of mantle zones. BCL1 is negative within lymphocytes. CD21 and CD23 highlight intact and disrupted follicular dendritic cell meshworks. CD43 highlights T-cells and plasma cells. CD30 highlights increased immunoblasts, which are predominantly seen at the periphery of germinal centers, as well as a  subset of interdigitating dendritic cells and Langerhans cells. MUM-1 highlights increased interdigitating dendritic cells and Langerhans cells, as well as scattered plasma cells. c-MYC highlights scattered cells with a single focus of loose clustering. EBV encoded RNA in situ hybridization (LELE APOLINAR) is negative. The Ki-67 proliferation index is overall low (<10%) within background lymphocytes, and is appropriately higher within polarized germinal centers. Maco Yair highlights melanin pigment deposition. All stains are performed with appropriate controls.         FLOW CYTOMETRY ANALYSIS: Boomi #[id number]; see separate report      Preliminary result electronically signed by Ricco Roberts MD on 1/6/2025 at 1613 EST  Preliminary result electronically signed by Ricco Roberts MD on 12/23/2024 at 1829 EST       Labs:      Imaging  No results found.  I personally reviewed and interpreted the above laboratory and imaging data.

## 2025-01-07 NOTE — ASSESSMENT & PLAN NOTE
12/18/2024 patient had excisional biopsy of right axillary node.  This showed follicular and paracortical hyperplasia with findings suggestive of dermatopathic lymphadenopathy.  I reviewed the case with hematopathology.  They will present at upcoming [place] hematopathology conference for other diagnostic thoughts.  I reviewed the above with the patient.  He voiced understanding and agreement.  I will get back to him once I hear back from pathology about consensus recommendation.  We reviewed that another biopsy may be applicable.

## 2025-01-07 NOTE — PROGRESS NOTES
Virtual Regular Visit  Name: Jaime Schofield Jr.      : 1995      MRN: [id number]  Encounter Provider: Stanley Cortez DO  Encounter Date: 2025   Encounter department: [facility] [place]      Verification of patient location:  Patient is located at Home in the following state in which I hold an active license [place] :  Assessment & Plan  Nodular lymphocyte predom Hodgkin lymphoma, nodes mult site ([facility])  2024 patient had excisional biopsy of right axillary node.  This showed follicular and paracortical hyperplasia with findings suggestive of dermatopathic lymphadenopathy.  I reviewed the case with hematopathology.  They will present at upcoming [place] hematopathology conference for other diagnostic thoughts.  I reviewed the above with the patient.  He voiced understanding and agreement.  I will get back to him once I hear back from pathology about consensus recommendation.  We reviewed that another biopsy may be applicable.           Encounter provider Stanley Cortez DO    The patient was identified by name and date of birth. Jaime Schofield Jr. was informed that this is a telemedicine visit and that the visit is being conducted through the Epic Embedded platform. He agrees to proceed..  My office door was closed. No one else was in the room.  He acknowledged consent and understanding of privacy and security of the video platform. The patient has agreed to participate and understands they can discontinue the visit at any time.    Patient is aware this is a billable service.     History of Present Illness     HPI history of NLPHL and 2012.  Treated with Rituxan.  Relapsed in 2016.  Again treated with Rituxan with good response.  Imaging in late  showed increasing diffuse adenopathy.  Bone marrow biopsy 2024 showed no evidence of lymphoma or other abnormality.      Review of Systems   Constitutional:  Negative for chills and fever.   HENT:   Negative for nosebleeds.    Eyes:  Negative for discharge.   Respiratory:  Negative for cough and shortness of breath.    Cardiovascular:  Negative for chest pain.   Gastrointestinal:  Negative for abdominal pain, constipation and diarrhea.   Endocrine: Negative for polydipsia.   Genitourinary:  Negative for hematuria.   Musculoskeletal:  Negative for arthralgias.   Skin:  Negative for color change.   Allergic/Immunologic: Negative for immunocompromised state.   Neurological:  Negative for dizziness and headaches.   Hematological:  Negative for adenopathy.   Psychiatric/Behavioral:  Negative for agitation.        Objective   There were no vitals taken for this visit.    Physical Exam  Constitutional:       General: He is not in acute distress.     Appearance: He is well-developed.   HENT:      Head: Atraumatic.      Nose: No congestion.   Eyes:      Conjunctiva/sclera: Conjunctivae normal.   Pulmonary:      Effort: Pulmonary effort is normal.   Abdominal:      General: There is no distension.   Musculoskeletal:         General: No deformity.      Cervical back: Normal range of motion.   Skin:     Findings: No rash.   Neurological:      Mental Status: He is alert and oriented to person, place, and time.         Visit Time  Total Visit Duration: 15 min.

## 2025-01-15 LAB — SCAN RESULT: NORMAL

## (undated) DEVICE — CHLORAPREP HI-LITE 10.5ML ORANGE

## (undated) DEVICE — OCCLUSIVE GAUZE STRIP,3% BISMUTH TRIBROMOPHENATE IN PETROLATUM BLEND: Brand: XEROFORM

## (undated) DEVICE — LIGHT GLOVE GREEN

## (undated) DEVICE — PADDING CAST 6IN COTTON STRL

## (undated) DEVICE — GAUZE SPONGES,16 PLY: Brand: CURITY

## (undated) DEVICE — LIGHT HANDLE COVER SLEEVE DISP BLUE STELLAR

## (undated) DEVICE — BETHLEHEM UNIVERSAL  ARTHRO PK: Brand: CARDINAL HEALTH

## (undated) DEVICE — ELECTRODE BLADE MOD E-Z CLEAN 2.5IN 6.4CM -0012M

## (undated) DEVICE — BETHLEHEM UNIVERSAL MINOR GEN: Brand: CARDINAL HEALTH

## (undated) DEVICE — PAD GROUNDING DUAL ADULT

## (undated) DEVICE — GLOVE SRG BIOGEL 7.5

## (undated) DEVICE — GLOVE INDICATOR PI UNDERGLOVE SZ 7.5 BLUE

## (undated) DEVICE — CUFF TOURNIQUET 34 X 4 IN QUICK CONNECT DISP 1BLA

## (undated) DEVICE — TUBING SUCTION 5MM X 12 FT

## (undated) DEVICE — GLOVE SRG BIOGEL ECLIPSE 8

## (undated) DEVICE — PLUMEPEN PRO 10FT

## (undated) DEVICE — SCD SEQUENTIAL COMPRESSION COMFORT SLEEVE MEDIUM KNEE LENGTH: Brand: KENDALL SCD

## (undated) DEVICE — ANTIBACTERIAL UNDYED BRAIDED (POLYGLACTIN 910), SYNTHETIC ABSORBABLE SUTURE: Brand: COATED VICRYL

## (undated) DEVICE — ABDOMINAL PAD: Brand: DERMACEA

## (undated) DEVICE — MEDI-VAC YANK SUCT HNDL W/TPRD BULBOUS TIP: Brand: CARDINAL HEALTH

## (undated) DEVICE — SPECIMEN CONTAINER STERILE PEEL PACK

## (undated) DEVICE — SUT ETHILON 3-0 PS-1 18 IN 1663H

## (undated) DEVICE — NEEDLE 25G X 1 1/2

## (undated) DEVICE — CHLORAPREP HI-LITE 26ML ORANGE

## (undated) DEVICE — ACE WRAP 6 IN STERILE

## (undated) DEVICE — BLADE SHAVER DISSECTOR 4MM 13CM COOLCUT

## (undated) DEVICE — 3M™ STERI-STRIP™ REINFORCED ADHESIVE SKIN CLOSURES, R1547, 1/2 IN X 4 IN (12 MM X 100 MM), 6 STRIPS/ENVELOPE: Brand: 3M™ STERI-STRIP™

## (undated) DEVICE — EXOFIN PRECISION PEN HIGH VISCOSITY TOPICAL SKIN ADHESIVE: Brand: EXOFIN PRECISION PEN, 1G

## (undated) DEVICE — INTENDED FOR TISSUE SEPARATION, AND OTHER PROCEDURES THAT REQUIRE A SHARP SURGICAL BLADE TO PUNCTURE OR CUT.: Brand: BARD-PARKER SAFETY BLADES SIZE 15, STERILE

## (undated) DEVICE — TELFA NON-ADHERENT ABSORBENT DRESSING: Brand: TELFA

## (undated) DEVICE — SUT FIBERWARE 2-0 MENISCUS REPAIR NEEDLE 38IN AR-7223

## (undated) DEVICE — DRAPE EQUIPMENT RF WAND

## (undated) DEVICE — GLOVE INDICATOR PI UNDERGLOVE SZ 8 BLUE

## (undated) DEVICE — IMPERVIOUS STOCKINETTE: Brand: DEROYAL

## (undated) DEVICE — SUT MONOCRYL 4-0 PS-2 27 IN Y426H

## (undated) DEVICE — SUT VICRYL 2-0 D-SPECIAL 27 IN D8114

## (undated) DEVICE — INTENDED FOR TISSUE SEPARATION, AND OTHER PROCEDURES THAT REQUIRE A SHARP SURGICAL BLADE TO PUNCTURE OR CUT.: Brand: BARD-PARKER ® CARBON RIB-BACK BLADES

## (undated) DEVICE — TUBING ARTHROSCOPIC WAVE  MAIN PUMP

## (undated) DEVICE — PROBE ABLATION  APOLLO RF 90 DEG MULTI PORT

## (undated) DEVICE — SUT VICRYL 3-0 18 IN J110T